# Patient Record
Sex: FEMALE | Race: BLACK OR AFRICAN AMERICAN | NOT HISPANIC OR LATINO | Employment: FULL TIME | URBAN - METROPOLITAN AREA
[De-identification: names, ages, dates, MRNs, and addresses within clinical notes are randomized per-mention and may not be internally consistent; named-entity substitution may affect disease eponyms.]

---

## 2023-02-03 VITALS
HEIGHT: 64 IN | BODY MASS INDEX: 41.32 KG/M2 | WEIGHT: 242 LBS | DIASTOLIC BLOOD PRESSURE: 66 MMHG | SYSTOLIC BLOOD PRESSURE: 124 MMHG

## 2023-02-03 PROBLEM — E78.5 HYPERLIPEMIA: Status: ACTIVE | Noted: 2023-02-03

## 2023-02-03 PROBLEM — K21.9 GERD (GASTROESOPHAGEAL REFLUX DISEASE): Status: ACTIVE | Noted: 2023-02-03

## 2023-02-03 PROBLEM — J45.40: Status: ACTIVE | Noted: 2023-02-03

## 2023-02-03 PROBLEM — G57.12 MERALGIA PARESTHETICA OF LEFT SIDE: Status: ACTIVE | Noted: 2023-02-03

## 2023-02-03 PROBLEM — I25.10 ARTERIOSCLEROSIS OF CORONARY ARTERY: Status: ACTIVE | Noted: 2023-02-03

## 2023-02-03 PROBLEM — R06.02 SOB (SHORTNESS OF BREATH) ON EXERTION: Status: ACTIVE | Noted: 2023-02-03

## 2023-02-03 PROBLEM — E66.9 OBESITY: Status: ACTIVE | Noted: 2023-02-03

## 2023-02-03 PROBLEM — G47.33 OSA (OBSTRUCTIVE SLEEP APNEA): Status: ACTIVE | Noted: 2023-02-03

## 2023-02-03 PROBLEM — G56.00 CARPAL TUNNEL SYNDROME: Status: ACTIVE | Noted: 2023-02-03

## 2023-02-03 PROBLEM — R00.2 PALPITATIONS: Status: ACTIVE | Noted: 2023-02-03

## 2023-02-03 PROBLEM — B97.7 HPV IN FEMALE: Status: ACTIVE | Noted: 2023-02-03

## 2023-02-03 PROBLEM — I10 BENIGN HYPERTENSION: Status: ACTIVE | Noted: 2023-02-03

## 2023-02-03 PROBLEM — E11.9 DIABETES MELLITUS (MULTI): Status: ACTIVE | Noted: 2023-02-03

## 2023-02-03 RX ORDER — GLUCOSAMINE/MSM/CHONDROIT SULF 500-166.6
1 TABLET ORAL DAILY
COMMUNITY
Start: 2021-07-19

## 2023-02-03 RX ORDER — ATORVASTATIN CALCIUM 80 MG/1
80 TABLET, FILM COATED ORAL NIGHTLY
COMMUNITY
Start: 2015-09-04 | End: 2023-03-15

## 2023-02-03 RX ORDER — METOPROLOL TARTRATE 50 MG/1
50 TABLET ORAL 2 TIMES DAILY
COMMUNITY
Start: 2015-09-04 | End: 2023-03-15

## 2023-02-03 RX ORDER — AMLODIPINE BESYLATE 5 MG/1
5 TABLET ORAL DAILY
COMMUNITY
Start: 2020-01-09 | End: 2023-03-15

## 2023-02-03 RX ORDER — EZETIMIBE 10 MG/1
10 TABLET ORAL DAILY
COMMUNITY
Start: 2018-02-02 | End: 2023-04-04 | Stop reason: SDUPTHER

## 2023-02-03 RX ORDER — LISINOPRIL 20 MG/1
20 TABLET ORAL DAILY
COMMUNITY
Start: 2022-03-25 | End: 2023-03-15

## 2023-02-03 RX ORDER — CHOLECALCIFEROL (VITAMIN D3) 50 MCG
2000 TABLET ORAL DAILY
COMMUNITY
Start: 2017-06-29

## 2023-02-03 RX ORDER — PANTOPRAZOLE SODIUM 40 MG/1
40 TABLET, DELAYED RELEASE ORAL DAILY
COMMUNITY
Start: 2022-06-04 | End: 2023-04-04 | Stop reason: SDUPTHER

## 2023-02-27 PROBLEM — M79.671 RIGHT FOOT PAIN: Status: ACTIVE | Noted: 2023-02-27

## 2023-02-27 PROBLEM — H43.399 FLOATERS: Status: ACTIVE | Noted: 2023-02-27

## 2023-02-27 PROBLEM — J98.4 RESTRICTIVE LUNG DISEASE: Status: ACTIVE | Noted: 2023-02-27
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
## 2023-02-27 PROBLEM — R06.83 SNORING: Status: ACTIVE | Noted: 2023-02-27

## 2023-02-27 PROBLEM — R42 DIZZINESS: Status: ACTIVE | Noted: 2023-02-27

## 2023-02-27 PROBLEM — J01.90 ACUTE SINUSITIS: Status: ACTIVE | Noted: 2023-02-27

## 2023-02-27 PROBLEM — M79.662 PAIN OF LEFT CALF: Status: ACTIVE | Noted: 2023-02-27

## 2023-02-27 PROBLEM — M79.89 ARM SWELLING: Status: ACTIVE | Noted: 2023-02-27

## 2023-02-27 PROBLEM — M79.603 ARM PAIN: Status: ACTIVE | Noted: 2023-02-27

## 2023-02-27 PROBLEM — M25.569 PAIN IN JOINT, LOWER LEG: Status: ACTIVE | Noted: 2023-02-27

## 2023-02-27 PROBLEM — M79.602 PAIN OF LEFT UPPER EXTREMITY: Status: ACTIVE | Noted: 2023-02-27

## 2023-02-27 PROBLEM — M53.3 TAIL BONE PAIN: Status: ACTIVE | Noted: 2023-02-27

## 2023-02-27 PROBLEM — M79.672 LEFT FOOT PAIN: Status: ACTIVE | Noted: 2023-02-27

## 2023-02-27 PROBLEM — R76.11 POSITIVE TB TEST: Status: ACTIVE | Noted: 2023-02-27

## 2023-02-27 PROBLEM — M79.676 PAIN OF GREAT TOE: Status: ACTIVE | Noted: 2023-02-27

## 2023-02-27 PROBLEM — M25.519 SHOULDER PAIN: Status: ACTIVE | Noted: 2023-02-27

## 2023-02-27 PROBLEM — J45.909 EXTRINSIC ASTHMA (HHS-HCC): Status: ACTIVE | Noted: 2023-02-27

## 2023-02-27 PROBLEM — R05.9 COUGH: Status: ACTIVE | Noted: 2023-02-27

## 2023-02-27 PROBLEM — R20.0 NUMBNESS: Status: ACTIVE | Noted: 2023-02-27

## 2023-02-27 PROBLEM — R20.0 LEFT ARM NUMBNESS: Status: ACTIVE | Noted: 2023-02-27

## 2023-02-27 PROBLEM — N62 LARGE BREASTS: Status: ACTIVE | Noted: 2023-02-27

## 2023-02-27 PROBLEM — M79.89 LEFT LEG SWELLING: Status: ACTIVE | Noted: 2023-02-27

## 2023-02-27 PROBLEM — E78.00 HIGH CHOLESTEROL: Status: ACTIVE | Noted: 2023-02-27

## 2023-02-27 PROBLEM — J20.9 ACUTE BRONCHITIS: Status: ACTIVE | Noted: 2023-02-27

## 2023-02-27 PROBLEM — M54.9 BACK PAIN: Status: ACTIVE | Noted: 2023-02-27

## 2023-02-27 PROBLEM — M79.601 PAIN OF RIGHT UPPER EXTREMITY: Status: ACTIVE | Noted: 2023-02-27

## 2023-02-27 PROBLEM — L60.0 INGROWING NAIL: Status: ACTIVE | Noted: 2023-02-27

## 2023-02-27 PROBLEM — Z20.822 EXPOSURE TO COVID-19 VIRUS: Status: ACTIVE | Noted: 2023-02-27

## 2023-02-27 PROBLEM — M79.642 PAIN OF LEFT HAND: Status: ACTIVE | Noted: 2023-02-27

## 2023-02-27 PROBLEM — R07.9 CHEST PAIN: Status: ACTIVE | Noted: 2023-02-27

## 2023-02-27 PROBLEM — R73.9 ELEVATED BLOOD SUGAR: Status: ACTIVE | Noted: 2023-02-27

## 2023-02-27 PROBLEM — G47.30 SLEEP APNEA: Status: ACTIVE | Noted: 2023-02-27

## 2023-02-27 PROBLEM — E66.01 MORBID OBESITY (MULTI): Status: ACTIVE | Noted: 2023-02-27

## 2023-02-27 PROBLEM — L84 CORN OF TOE: Status: ACTIVE | Noted: 2023-02-27

## 2023-02-27 PROBLEM — D22.9 NUMEROUS MOLES: Status: ACTIVE | Noted: 2023-02-27

## 2023-02-27 PROBLEM — J31.0 RHINITIS, CHRONIC: Status: ACTIVE | Noted: 2023-02-27

## 2023-03-15 DIAGNOSIS — E78.5 HYPERLIPIDEMIA, UNSPECIFIED: ICD-10-CM

## 2023-03-15 DIAGNOSIS — E11.9 TYPE 2 DIABETES MELLITUS WITHOUT COMPLICATIONS (MULTI): ICD-10-CM

## 2023-03-15 DIAGNOSIS — I10 ESSENTIAL (PRIMARY) HYPERTENSION: ICD-10-CM

## 2023-03-15 DIAGNOSIS — I25.10 ATHEROSCLEROTIC HEART DISEASE OF NATIVE CORONARY ARTERY WITHOUT ANGINA PECTORIS: ICD-10-CM

## 2023-03-15 RX ORDER — LISINOPRIL 20 MG/1
TABLET ORAL
Qty: 90 TABLET | Refills: 1 | Status: SHIPPED | OUTPATIENT
Start: 2023-03-15 | End: 2023-04-04 | Stop reason: SDUPTHER

## 2023-03-15 RX ORDER — METFORMIN HYDROCHLORIDE 1000 MG/1
TABLET ORAL
Qty: 180 TABLET | Refills: 1 | Status: SHIPPED | OUTPATIENT
Start: 2023-03-15 | End: 2023-04-04 | Stop reason: SDUPTHER

## 2023-03-15 RX ORDER — METOPROLOL TARTRATE 50 MG/1
TABLET ORAL
Qty: 180 TABLET | Refills: 1 | Status: SHIPPED | OUTPATIENT
Start: 2023-03-15 | End: 2023-04-04 | Stop reason: SDUPTHER

## 2023-03-15 RX ORDER — ATORVASTATIN CALCIUM 80 MG/1
TABLET, FILM COATED ORAL
Qty: 90 TABLET | Refills: 1 | Status: SHIPPED | OUTPATIENT
Start: 2023-03-15 | End: 2023-04-04 | Stop reason: SDUPTHER

## 2023-03-15 RX ORDER — AMLODIPINE BESYLATE 5 MG/1
TABLET ORAL
Qty: 90 TABLET | Refills: 1 | Status: SHIPPED | OUTPATIENT
Start: 2023-03-15 | End: 2023-04-04 | Stop reason: SDUPTHER

## 2023-04-03 LAB
ALANINE AMINOTRANSFERASE (SGPT) (U/L) IN SER/PLAS: 48 U/L (ref 7–45)
ALBUMIN (G/DL) IN SER/PLAS: 3.8 G/DL (ref 3.4–5)
ALKALINE PHOSPHATASE (U/L) IN SER/PLAS: 81 U/L (ref 33–136)
ANION GAP IN SER/PLAS: 10 MMOL/L (ref 10–20)
ASPARTATE AMINOTRANSFERASE (SGOT) (U/L) IN SER/PLAS: 25 U/L (ref 9–39)
BILIRUBIN TOTAL (MG/DL) IN SER/PLAS: 0.6 MG/DL (ref 0–1.2)
CALCIUM (MG/DL) IN SER/PLAS: 9.8 MG/DL (ref 8.6–10.6)
CARBON DIOXIDE, TOTAL (MMOL/L) IN SER/PLAS: 30 MMOL/L (ref 21–32)
CHLORIDE (MMOL/L) IN SER/PLAS: 105 MMOL/L (ref 98–107)
CHOLESTEROL (MG/DL) IN SER/PLAS: 134 MG/DL (ref 0–199)
CHOLESTEROL IN HDL (MG/DL) IN SER/PLAS: 45 MG/DL
CHOLESTEROL/HDL RATIO: 3
CREATININE (MG/DL) IN SER/PLAS: 0.89 MG/DL (ref 0.5–1.05)
ERYTHROCYTE DISTRIBUTION WIDTH (RATIO) BY AUTOMATED COUNT: 15 % (ref 11.5–14.5)
ERYTHROCYTE MEAN CORPUSCULAR HEMOGLOBIN CONCENTRATION (G/DL) BY AUTOMATED: 33.2 G/DL (ref 32–36)
ERYTHROCYTE MEAN CORPUSCULAR VOLUME (FL) BY AUTOMATED COUNT: 80 FL (ref 80–100)
ERYTHROCYTES (10*6/UL) IN BLOOD BY AUTOMATED COUNT: 5.06 X10E12/L (ref 4–5.2)
ESTIMATED AVERAGE GLUCOSE FOR HBA1C: 131 MG/DL
GFR FEMALE: 72 ML/MIN/1.73M2
GLUCOSE (MG/DL) IN SER/PLAS: 90 MG/DL (ref 74–99)
HEMATOCRIT (%) IN BLOOD BY AUTOMATED COUNT: 40.7 % (ref 36–46)
HEMOGLOBIN (G/DL) IN BLOOD: 13.5 G/DL (ref 12–16)
HEMOGLOBIN A1C/HEMOGLOBIN TOTAL IN BLOOD: 6.2 %
LDL: 73 MG/DL (ref 0–99)
LEUKOCYTES (10*3/UL) IN BLOOD BY AUTOMATED COUNT: 7.7 X10E9/L (ref 4.4–11.3)
NRBC (PER 100 WBCS) BY AUTOMATED COUNT: 0 /100 WBC (ref 0–0)
PLATELETS (10*3/UL) IN BLOOD AUTOMATED COUNT: 199 X10E9/L (ref 150–450)
POTASSIUM (MMOL/L) IN SER/PLAS: 4.3 MMOL/L (ref 3.5–5.3)
PROTEIN TOTAL: 6.2 G/DL (ref 6.4–8.2)
SODIUM (MMOL/L) IN SER/PLAS: 141 MMOL/L (ref 136–145)
TRIGLYCERIDE (MG/DL) IN SER/PLAS: 79 MG/DL (ref 0–149)
UREA NITROGEN (MG/DL) IN SER/PLAS: 15 MG/DL (ref 6–23)
VLDL: 16 MG/DL (ref 0–40)

## 2023-04-04 ENCOUNTER — OFFICE VISIT (OUTPATIENT)
Dept: PRIMARY CARE | Facility: CLINIC | Age: 68
End: 2023-04-04
Payer: COMMERCIAL

## 2023-04-04 VITALS
WEIGHT: 240 LBS | BODY MASS INDEX: 40.97 KG/M2 | DIASTOLIC BLOOD PRESSURE: 74 MMHG | HEIGHT: 64 IN | SYSTOLIC BLOOD PRESSURE: 100 MMHG

## 2023-04-04 DIAGNOSIS — E11.9 TYPE 2 DIABETES MELLITUS WITHOUT COMPLICATIONS (MULTI): ICD-10-CM

## 2023-04-04 DIAGNOSIS — R74.8 ELEVATED LIVER ENZYMES: ICD-10-CM

## 2023-04-04 DIAGNOSIS — E78.5 HYPERLIPIDEMIA, UNSPECIFIED: ICD-10-CM

## 2023-04-04 DIAGNOSIS — I10 ESSENTIAL (PRIMARY) HYPERTENSION: ICD-10-CM

## 2023-04-04 DIAGNOSIS — G47.33 OSA (OBSTRUCTIVE SLEEP APNEA): Primary | ICD-10-CM

## 2023-04-04 DIAGNOSIS — J98.4 RESTRICTIVE LUNG DISEASE: ICD-10-CM

## 2023-04-04 DIAGNOSIS — I25.10 ATHEROSCLEROTIC HEART DISEASE OF NATIVE CORONARY ARTERY WITHOUT ANGINA PECTORIS: ICD-10-CM

## 2023-04-04 DIAGNOSIS — K21.9 GASTROESOPHAGEAL REFLUX DISEASE WITHOUT ESOPHAGITIS: ICD-10-CM

## 2023-04-04 DIAGNOSIS — I25.10 ARTERIOSCLEROSIS OF CORONARY ARTERY: ICD-10-CM

## 2023-04-04 PROCEDURE — 3078F DIAST BP <80 MM HG: CPT | Performed by: INTERNAL MEDICINE

## 2023-04-04 PROCEDURE — 1159F MED LIST DOCD IN RCRD: CPT | Performed by: INTERNAL MEDICINE

## 2023-04-04 PROCEDURE — 3074F SYST BP LT 130 MM HG: CPT | Performed by: INTERNAL MEDICINE

## 2023-04-04 PROCEDURE — 3044F HG A1C LEVEL LT 7.0%: CPT | Performed by: INTERNAL MEDICINE

## 2023-04-04 PROCEDURE — 4010F ACE/ARB THERAPY RXD/TAKEN: CPT | Performed by: INTERNAL MEDICINE

## 2023-04-04 PROCEDURE — 99214 OFFICE O/P EST MOD 30 MIN: CPT | Performed by: INTERNAL MEDICINE

## 2023-04-04 RX ORDER — LISINOPRIL 20 MG/1
20 TABLET ORAL DAILY
Qty: 90 TABLET | Refills: 0 | Status: SHIPPED | OUTPATIENT
Start: 2023-04-04 | End: 2023-06-13

## 2023-04-04 RX ORDER — AMLODIPINE BESYLATE 5 MG/1
5 TABLET ORAL DAILY
Qty: 90 TABLET | Refills: 0 | Status: SHIPPED | OUTPATIENT
Start: 2023-04-04 | End: 2023-06-13

## 2023-04-04 RX ORDER — EZETIMIBE 10 MG/1
10 TABLET ORAL DAILY
Qty: 90 TABLET | Refills: 0 | Status: SHIPPED | OUTPATIENT
Start: 2023-04-04 | End: 2023-06-13

## 2023-04-04 RX ORDER — ATORVASTATIN CALCIUM 80 MG/1
80 TABLET, FILM COATED ORAL NIGHTLY
Qty: 90 TABLET | Refills: 0 | Status: SHIPPED | OUTPATIENT
Start: 2023-04-04 | End: 2023-06-13

## 2023-04-04 RX ORDER — PANTOPRAZOLE SODIUM 40 MG/1
40 TABLET, DELAYED RELEASE ORAL DAILY
Qty: 90 TABLET | Refills: 0 | Status: SHIPPED | OUTPATIENT
Start: 2023-04-04 | End: 2023-06-27 | Stop reason: SDUPTHER

## 2023-04-04 RX ORDER — METFORMIN HYDROCHLORIDE 1000 MG/1
1000 TABLET ORAL 2 TIMES DAILY
Qty: 180 TABLET | Refills: 0 | Status: SHIPPED | OUTPATIENT
Start: 2023-04-04 | End: 2023-06-13

## 2023-04-04 RX ORDER — METOPROLOL TARTRATE 50 MG/1
50 TABLET ORAL 2 TIMES DAILY
Qty: 180 TABLET | Refills: 0 | Status: SHIPPED | OUTPATIENT
Start: 2023-04-04 | End: 2023-06-13

## 2023-04-04 NOTE — PROGRESS NOTES
"Subjective   Patient ID: Stefanie Garnett is a 67 y.o. female who presents for Follow-up.    HPI   Patient here for follow-up  Did blood work  Got new CPAP machine  She is using her CPAP machine regularly  Follow-up on hypertension high cholesterol  Having a lot of acid  Not able to lose weight.  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  Patient has not lost weight  She has not changed her eating habits yet      Patient is here for follow-up on obstructive sleep apnea  She is using the CPAP machine it is helping her  Follow-up on hypertension high cholesterol coronary artery disease   needs medication refill  Not watching diet gained weight    Needs flu shot  Overdue for GYN exam  She is seriously considering gastric bypass surgery because she is not able to lose weight she wants insurance papers filled for her hospital stay for MI    She was not using her CPAP machine  Came to the hospital with chest pain and non-ST elevation MI had repeat cardiac catheter done did not show any significant blockage  Patient was treated with antibiotic and steroid and feeling better  Her blood pressure was high and Norvasc was started She has degenerative changes in the lower back so she cannot do heavy lifting   Review of Systems    Objective   /74   Ht 1.626 m (5' 4\")   Wt 109 kg (240 lb)   BMI 41.20 kg/m²     Physical Exam  Vitals reviewed.   Constitutional:       Appearance: Normal appearance. "   HENT:      Head: Normocephalic and atraumatic.      Right Ear: Tympanic membrane, ear canal and external ear normal.      Left Ear: Tympanic membrane, ear canal and external ear normal.      Nose: Nose normal.      Mouth/Throat:      Pharynx: Oropharynx is clear.   Eyes:      Extraocular Movements: Extraocular movements intact.      Conjunctiva/sclera: Conjunctivae normal.      Pupils: Pupils are equal, round, and reactive to light.   Cardiovascular:      Rate and Rhythm: Normal rate and regular rhythm.      Pulses: Normal pulses.      Heart sounds: Normal heart sounds.   Pulmonary:      Effort: Pulmonary effort is normal.      Breath sounds: Normal breath sounds.   Abdominal:      General: Abdomen is flat. Bowel sounds are normal.      Palpations: Abdomen is soft.   Musculoskeletal:      Cervical back: Normal range of motion and neck supple.   Skin:     General: Skin is warm and dry.   Neurological:      General: No focal deficit present.      Mental Status: She is alert and oriented to person, place, and time.   Psychiatric:         Mood and Affect: Mood normal.       Assessment/Plan   Problem List Items Addressed This Visit          Nervous    ANGELICA (obstructive sleep apnea) - Primary       Respiratory    Restrictive lung disease       Circulatory    Arteriosclerosis of coronary artery    Relevant Medications    amLODIPine (Norvasc) 5 mg tablet    ezetimibe (Zetia) 10 mg tablet    metoprolol tartrate (Lopressor) 50 mg tablet       Digestive    GERD (gastroesophageal reflux disease)    Relevant Medications    pantoprazole (ProtoNix) 40 mg EC tablet     Other Visit Diagnoses       Essential (primary) hypertension        Relevant Medications    amLODIPine (Norvasc) 5 mg tablet    lisinopril 20 mg tablet    metoprolol tartrate (Lopressor) 50 mg tablet    Other Relevant Orders    CBC    Comprehensive Metabolic Panel    Hemoglobin A1C    Lipid Panel    Hyperlipidemia, unspecified        Relevant Medications     atorvastatin (Lipitor) 80 mg tablet    Other Relevant Orders    CBC    Comprehensive Metabolic Panel    Hemoglobin A1C    Lipid Panel    Atherosclerotic heart disease of native coronary artery without angina pectoris        Relevant Medications    amLODIPine (Norvasc) 5 mg tablet    lisinopril 20 mg tablet    metoprolol tartrate (Lopressor) 50 mg tablet    Type 2 diabetes mellitus without complications (CMS/[facility])        Relevant Medications    metFORMIN (Glucophage) 1,000 mg tablet    Other Relevant Orders    CBC    Comprehensive Metabolic Panel    Hemoglobin A1C    Lipid Panel               past recap  1. CAD. Had NSTEMI August 2015. Catheter showed no definite high-grade culprit lesion that would necessitate intervention. There was some moderate disease in the distal aspect of the first marginal branch of the LCx, but otherwise the patient had very minimal CAD. Echocardiogram done June 2017 showed an EF of 60-65%. Patient had repeat exercise nuclear stress test June 2017 which was negative for ischemia. Patient had again acute MI cardiac catheter did not show any blockage. MI was attributed to coronary spasm    2. Hypertension. Adequately controlled     3. Hyperlipidemia. . Continue atorvastatin 80 mg daily as well as Zetia 10 mg daily.    4. Obesity. Weight loss was encouraged. Refer to Dr. Skip Suarez for considering gastric bypass    5. Sleep apnea. Using CPAP    6. Carotid ultrasound. No hemodynamically significant stenosis when done June 2017.    7. Diabetes  A1c 6.4  Doing better  Continue Metformin    #8 acute sinusitis  Treat with Z-Tien    Flu shot given  Mammogram ordered  Meds refilled for 6 months    4/423  Patient is looking stable  Blood pressure stable  Blood work reviewed  Cholesterol under control  Blood pressure stable  A1c 6.2  Liver enzymes slightly elevated  Possibility of fatty liver  Discussed complications  Using CPAP  Follow-up blood work in 3 months  Medications refilled

## 2023-05-04 ENCOUNTER — APPOINTMENT (OUTPATIENT)
Dept: PRIMARY CARE | Facility: CLINIC | Age: 68
End: 2023-05-04
Payer: COMMERCIAL

## 2023-06-11 DIAGNOSIS — I10 ESSENTIAL (PRIMARY) HYPERTENSION: ICD-10-CM

## 2023-06-11 DIAGNOSIS — E11.9 TYPE 2 DIABETES MELLITUS WITHOUT COMPLICATIONS (MULTI): ICD-10-CM

## 2023-06-11 DIAGNOSIS — E78.5 HYPERLIPIDEMIA, UNSPECIFIED: ICD-10-CM

## 2023-06-11 DIAGNOSIS — I25.10 ARTERIOSCLEROSIS OF CORONARY ARTERY: ICD-10-CM

## 2023-06-11 DIAGNOSIS — I25.10 ATHEROSCLEROTIC HEART DISEASE OF NATIVE CORONARY ARTERY WITHOUT ANGINA PECTORIS: ICD-10-CM

## 2023-06-13 RX ORDER — EZETIMIBE 10 MG/1
10 TABLET ORAL DAILY
Qty: 90 TABLET | Refills: 0 | Status: SHIPPED | OUTPATIENT
Start: 2023-06-13 | End: 2023-06-27 | Stop reason: SDUPTHER

## 2023-06-13 RX ORDER — ATORVASTATIN CALCIUM 80 MG/1
80 TABLET, FILM COATED ORAL NIGHTLY
Qty: 90 TABLET | Refills: 0 | Status: SHIPPED | OUTPATIENT
Start: 2023-06-13 | End: 2023-06-27 | Stop reason: SDUPTHER

## 2023-06-13 RX ORDER — AMLODIPINE BESYLATE 5 MG/1
5 TABLET ORAL DAILY
Qty: 90 TABLET | Refills: 0 | Status: SHIPPED | OUTPATIENT
Start: 2023-06-13 | End: 2023-06-27 | Stop reason: SDUPTHER

## 2023-06-13 RX ORDER — METFORMIN HYDROCHLORIDE 1000 MG/1
1000 TABLET ORAL 2 TIMES DAILY
Qty: 180 TABLET | Refills: 0 | Status: SHIPPED | OUTPATIENT
Start: 2023-06-13 | End: 2023-06-27 | Stop reason: SDUPTHER

## 2023-06-13 RX ORDER — METOPROLOL TARTRATE 50 MG/1
50 TABLET ORAL 2 TIMES DAILY
Qty: 180 TABLET | Refills: 0 | Status: SHIPPED | OUTPATIENT
Start: 2023-06-13 | End: 2023-06-27 | Stop reason: SDUPTHER

## 2023-06-13 RX ORDER — LISINOPRIL 20 MG/1
20 TABLET ORAL DAILY
Qty: 90 TABLET | Refills: 0 | Status: SHIPPED | OUTPATIENT
Start: 2023-06-13 | End: 2023-06-27 | Stop reason: SDUPTHER

## 2023-06-22 ENCOUNTER — LAB (OUTPATIENT)
Dept: LAB | Facility: LAB | Age: 68
End: 2023-06-22
Payer: COMMERCIAL

## 2023-06-22 DIAGNOSIS — I10 ESSENTIAL (PRIMARY) HYPERTENSION: ICD-10-CM

## 2023-06-22 DIAGNOSIS — E11.9 TYPE 2 DIABETES MELLITUS WITHOUT COMPLICATIONS (MULTI): ICD-10-CM

## 2023-06-22 DIAGNOSIS — E78.5 HYPERLIPIDEMIA, UNSPECIFIED: ICD-10-CM

## 2023-06-22 LAB
ALANINE AMINOTRANSFERASE (SGPT) (U/L) IN SER/PLAS: 49 U/L (ref 7–45)
ALBUMIN (G/DL) IN SER/PLAS: 4 G/DL (ref 3.4–5)
ALKALINE PHOSPHATASE (U/L) IN SER/PLAS: 92 U/L (ref 33–136)
ANION GAP IN SER/PLAS: 11 MMOL/L (ref 10–20)
ASPARTATE AMINOTRANSFERASE (SGOT) (U/L) IN SER/PLAS: 24 U/L (ref 9–39)
BILIRUBIN TOTAL (MG/DL) IN SER/PLAS: 0.7 MG/DL (ref 0–1.2)
CALCIUM (MG/DL) IN SER/PLAS: 9.8 MG/DL (ref 8.6–10.6)
CARBON DIOXIDE, TOTAL (MMOL/L) IN SER/PLAS: 29 MMOL/L (ref 21–32)
CHLORIDE (MMOL/L) IN SER/PLAS: 107 MMOL/L (ref 98–107)
CHOLESTEROL (MG/DL) IN SER/PLAS: 161 MG/DL (ref 0–199)
CHOLESTEROL IN HDL (MG/DL) IN SER/PLAS: 48.4 MG/DL
CHOLESTEROL/HDL RATIO: 3.3
CREATININE (MG/DL) IN SER/PLAS: 0.82 MG/DL (ref 0.5–1.05)
ERYTHROCYTE DISTRIBUTION WIDTH (RATIO) BY AUTOMATED COUNT: 15.9 % (ref 11.5–14.5)
ERYTHROCYTE MEAN CORPUSCULAR HEMOGLOBIN CONCENTRATION (G/DL) BY AUTOMATED: 33.9 G/DL (ref 32–36)
ERYTHROCYTE MEAN CORPUSCULAR VOLUME (FL) BY AUTOMATED COUNT: 78 FL (ref 80–100)
ERYTHROCYTES (10*6/UL) IN BLOOD BY AUTOMATED COUNT: 5.34 X10E12/L (ref 4–5.2)
ESTIMATED AVERAGE GLUCOSE FOR HBA1C: 134 MG/DL
GFR FEMALE: 79 ML/MIN/1.73M2
GLUCOSE (MG/DL) IN SER/PLAS: 83 MG/DL (ref 74–99)
HEMATOCRIT (%) IN BLOOD BY AUTOMATED COUNT: 41.9 % (ref 36–46)
HEMOGLOBIN (G/DL) IN BLOOD: 14.2 G/DL (ref 12–16)
HEMOGLOBIN A1C/HEMOGLOBIN TOTAL IN BLOOD: 6.3 %
LDL: 95 MG/DL (ref 0–99)
LEUKOCYTES (10*3/UL) IN BLOOD BY AUTOMATED COUNT: 7.3 X10E9/L (ref 4.4–11.3)
NRBC (PER 100 WBCS) BY AUTOMATED COUNT: 0 /100 WBC (ref 0–0)
PLATELETS (10*3/UL) IN BLOOD AUTOMATED COUNT: 181 X10E9/L (ref 150–450)
POTASSIUM (MMOL/L) IN SER/PLAS: 4.4 MMOL/L (ref 3.5–5.3)
PROTEIN TOTAL: 6.3 G/DL (ref 6.4–8.2)
SODIUM (MMOL/L) IN SER/PLAS: 143 MMOL/L (ref 136–145)
TRIGLYCERIDE (MG/DL) IN SER/PLAS: 86 MG/DL (ref 0–149)
UREA NITROGEN (MG/DL) IN SER/PLAS: 11 MG/DL (ref 6–23)
VLDL: 17 MG/DL (ref 0–40)

## 2023-06-22 PROCEDURE — 80061 LIPID PANEL: CPT

## 2023-06-22 PROCEDURE — 80053 COMPREHEN METABOLIC PANEL: CPT

## 2023-06-22 PROCEDURE — 83036 HEMOGLOBIN GLYCOSYLATED A1C: CPT

## 2023-06-22 PROCEDURE — 36415 COLL VENOUS BLD VENIPUNCTURE: CPT

## 2023-06-22 PROCEDURE — 85027 COMPLETE CBC AUTOMATED: CPT

## 2023-06-27 ENCOUNTER — OFFICE VISIT (OUTPATIENT)
Dept: PRIMARY CARE | Facility: CLINIC | Age: 68
End: 2023-06-27
Payer: COMMERCIAL

## 2023-06-27 VITALS
BODY MASS INDEX: 41.15 KG/M2 | HEIGHT: 64 IN | SYSTOLIC BLOOD PRESSURE: 124 MMHG | WEIGHT: 241 LBS | DIASTOLIC BLOOD PRESSURE: 70 MMHG

## 2023-06-27 DIAGNOSIS — I10 ESSENTIAL (PRIMARY) HYPERTENSION: ICD-10-CM

## 2023-06-27 DIAGNOSIS — E66.01 MORBID OBESITY (MULTI): ICD-10-CM

## 2023-06-27 DIAGNOSIS — I25.10 ATHEROSCLEROTIC HEART DISEASE OF NATIVE CORONARY ARTERY WITHOUT ANGINA PECTORIS: ICD-10-CM

## 2023-06-27 DIAGNOSIS — E11.9 TYPE 2 DIABETES MELLITUS WITHOUT COMPLICATIONS (MULTI): ICD-10-CM

## 2023-06-27 DIAGNOSIS — E78.5 HYPERLIPIDEMIA, UNSPECIFIED: ICD-10-CM

## 2023-06-27 DIAGNOSIS — E11.9 TYPE 2 DIABETES MELLITUS WITHOUT COMPLICATION, WITHOUT LONG-TERM CURRENT USE OF INSULIN (MULTI): Primary | ICD-10-CM

## 2023-06-27 DIAGNOSIS — K21.9 GASTROESOPHAGEAL REFLUX DISEASE WITHOUT ESOPHAGITIS: ICD-10-CM

## 2023-06-27 DIAGNOSIS — I25.10 ARTERIOSCLEROSIS OF CORONARY ARTERY: ICD-10-CM

## 2023-06-27 DIAGNOSIS — R74.8 ELEVATED LIVER ENZYMES: ICD-10-CM

## 2023-06-27 PROBLEM — D64.89 OTHER SPECIFIED ANEMIAS: Status: ACTIVE | Noted: 2023-06-27

## 2023-06-27 PROBLEM — E73.9 INTESTINAL DISACCHARIDASE DEFICIENCIES AND DISACCHARIDE MALABSORPTION: Status: ACTIVE | Noted: 2023-06-27

## 2023-06-27 PROCEDURE — 3074F SYST BP LT 130 MM HG: CPT | Performed by: INTERNAL MEDICINE

## 2023-06-27 PROCEDURE — 1159F MED LIST DOCD IN RCRD: CPT | Performed by: INTERNAL MEDICINE

## 2023-06-27 PROCEDURE — 4010F ACE/ARB THERAPY RXD/TAKEN: CPT | Performed by: INTERNAL MEDICINE

## 2023-06-27 PROCEDURE — 3078F DIAST BP <80 MM HG: CPT | Performed by: INTERNAL MEDICINE

## 2023-06-27 PROCEDURE — 3044F HG A1C LEVEL LT 7.0%: CPT | Performed by: INTERNAL MEDICINE

## 2023-06-27 PROCEDURE — 99213 OFFICE O/P EST LOW 20 MIN: CPT | Performed by: INTERNAL MEDICINE

## 2023-06-27 PROCEDURE — 1036F TOBACCO NON-USER: CPT | Performed by: INTERNAL MEDICINE

## 2023-06-27 RX ORDER — METOPROLOL TARTRATE 50 MG/1
50 TABLET ORAL 2 TIMES DAILY
Qty: 180 TABLET | Refills: 0 | Status: SHIPPED | OUTPATIENT
Start: 2023-06-27 | End: 2024-01-18

## 2023-06-27 RX ORDER — METFORMIN HYDROCHLORIDE 1000 MG/1
1000 TABLET ORAL 2 TIMES DAILY
Qty: 180 TABLET | Refills: 0 | Status: SHIPPED | OUTPATIENT
Start: 2023-06-27 | End: 2024-01-18

## 2023-06-27 RX ORDER — FLUTICASONE PROPIONATE 50 MCG
2 SPRAY, SUSPENSION (ML) NASAL DAILY
COMMUNITY
Start: 2023-02-22 | End: 2024-01-02 | Stop reason: ALTCHOICE

## 2023-06-27 RX ORDER — PANTOPRAZOLE SODIUM 40 MG/1
40 TABLET, DELAYED RELEASE ORAL DAILY
Qty: 90 TABLET | Refills: 0 | Status: SHIPPED | OUTPATIENT
Start: 2023-06-27 | End: 2023-07-25

## 2023-06-27 RX ORDER — AMLODIPINE BESYLATE 5 MG/1
5 TABLET ORAL DAILY
Qty: 90 TABLET | Refills: 0 | Status: SHIPPED | OUTPATIENT
Start: 2023-06-27 | End: 2024-01-18

## 2023-06-27 RX ORDER — LISINOPRIL 20 MG/1
20 TABLET ORAL DAILY
Qty: 90 TABLET | Refills: 0 | Status: SHIPPED | OUTPATIENT
Start: 2023-06-27 | End: 2024-02-22 | Stop reason: WASHOUT

## 2023-06-27 RX ORDER — EZETIMIBE 10 MG/1
10 TABLET ORAL DAILY
Qty: 90 TABLET | Refills: 0 | Status: SHIPPED | OUTPATIENT
Start: 2023-06-27 | End: 2024-01-18

## 2023-06-27 RX ORDER — ATORVASTATIN CALCIUM 80 MG/1
80 TABLET, FILM COATED ORAL NIGHTLY
Qty: 90 TABLET | Refills: 0 | Status: SHIPPED | OUTPATIENT
Start: 2023-06-27 | End: 2024-01-18

## 2023-06-27 NOTE — PROGRESS NOTES
"Subjective   Patient ID: Stefanie Garnett is a 68 y.o. female who presents for Follow-up and Med Refill.    Med Refill   Patient here for follow-up  Did blood work  Got new CPAP machine  She is using her CPAP machine regularly  Follow-up on hypertension high cholesterol  Having a lot of acid  Not able to lose weight.  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  Patient has not lost weight  She has not changed her eating habits yet        Patient is here for follow-up on obstructive sleep apnea  She is using the CPAP machine it is helping her  Follow-up on hypertension high cholesterol coronary artery disease   needs medication refill  Not watching diet gained weight     Needs flu shot  Overdue for GYN exam  She is seriously considering gastric bypass surgery because she is not able to lose weight she wants insurance papers filled for her hospital stay for MI     She was not using her CPAP machine  Came to the hospital with chest pain and non-ST elevation MI had repeat cardiac catheter done did not show any significant blockage  Patient was treated with antibiotic and steroid and feeling better  Her blood pressure was high and Norvasc was started She has degenerative changes in the lower back so she cannot do heavy lifting     Review of Systems    Objective   /70   Ht 1.626 m (5' 4\")   Wt 109 kg (241 lb)   BMI 41.37 kg/m²     Physical Exam  Vitals reviewed.   Constitutional:       " Appearance: Normal appearance.   HENT:      Head: Normocephalic and atraumatic.      Right Ear: Tympanic membrane, ear canal and external ear normal.      Left Ear: Tympanic membrane, ear canal and external ear normal.      Nose: Nose normal.      Mouth/Throat:      Pharynx: Oropharynx is clear.   Eyes:      Extraocular Movements: Extraocular movements intact.      Conjunctiva/sclera: Conjunctivae normal.      Pupils: Pupils are equal, round, and reactive to light.   Cardiovascular:      Rate and Rhythm: Normal rate and regular rhythm.      Pulses: Normal pulses.      Heart sounds: Normal heart sounds.   Pulmonary:      Effort: Pulmonary effort is normal.      Breath sounds: Normal breath sounds.   Abdominal:      General: Abdomen is flat. Bowel sounds are normal.      Palpations: Abdomen is soft.   Musculoskeletal:      Cervical back: Normal range of motion and neck supple.   Skin:     General: Skin is warm and dry.   Neurological:      General: No focal deficit present.      Mental Status: She is alert and oriented to person, place, and time.   Psychiatric:         Mood and Affect: Mood normal.       Assessment/Plan   Problem List Items Addressed This Visit          Cardiac and Vasculature    Arteriosclerosis of coronary artery    Relevant Medications    amLODIPine (Norvasc) 5 mg tablet    ezetimibe (Zetia) 10 mg tablet    metoprolol tartrate (Lopressor) 50 mg tablet       Endocrine/Metabolic    Diabetes mellitus (CMS/HCC) - Primary    Relevant Orders    CBC    Comprehensive Metabolic Panel    Hemoglobin A1C    Lipid Panel    TSH with reflex to Free T4 if abnormal    Morbid obesity (CMS/HCC)       Gastrointestinal and Abdominal    GERD (gastroesophageal reflux disease)    Relevant Medications    pantoprazole (ProtoNix) 40 mg EC tablet     Other Visit Diagnoses       Elevated liver enzymes        Relevant Orders    Comprehensive Metabolic Panel    Essential (primary) hypertension        Relevant Medications     amLODIPine (Norvasc) 5 mg tablet    lisinopril 20 mg tablet    metoprolol tartrate (Lopressor) 50 mg tablet    Hyperlipidemia, unspecified        Relevant Medications    atorvastatin (Lipitor) 80 mg tablet    Atherosclerotic heart disease of native coronary artery without angina pectoris        Relevant Medications    amLODIPine (Norvasc) 5 mg tablet    lisinopril 20 mg tablet    metoprolol tartrate (Lopressor) 50 mg tablet    Type 2 diabetes mellitus without complications (CMS/HCC)        Relevant Medications    metFORMIN (Glucophage) 1,000 mg tablet    Other Relevant Orders    CBC    Comprehensive Metabolic Panel    Hemoglobin A1C    Lipid Panel    TSH with reflex to Free T4 if abnormal        past recap  1. CAD. Had NSTEMI August 2015. Catheter showed no definite high-grade culprit lesion that would necessitate intervention. There was some moderate disease in the distal aspect of the first marginal branch of the LCx, but otherwise the patient had very minimal CAD. Echocardiogram done June 2017 showed an EF of 60-65%. Patient had repeat exercise nuclear stress test June 2017 which was negative for ischemia. Patient had again acute MI cardiac catheter did not show any blockage. MI was attributed to coronary spasm     2. Hypertension. Adequately controlled      3. Hyperlipidemia. . Continue atorvastatin 80 mg daily as well as Zetia 10 mg daily.     4. Obesity. Weight loss was encouraged. Refer to Dr. Skip Suarez for considering gastric bypass     5. Sleep apnea. Using CPAP     6. Carotid ultrasound. No hemodynamically significant stenosis when done June 2017.     7. Diabetes  A1c 6.4  Doing better  Continue Metformin     #8 acute sinusitis  Treat with Z-Tien     Flu shot given  Mammogram ordered  Meds refilled for 6 months     4/423  Patient is looking stable  Blood pressure stable  Blood work reviewed  Cholesterol under control  Blood pressure stable  A1c 6.2  Liver enzymes slightly elevated  Possibility of fatty  liver  Discussed complications  Using CPAP  Follow-up blood work in 3 months  Medications refilled    6/27/2023  Blood work reviewed  A1c 6.3 slightly up  Cholesterol under control  Patient is using CPAP with improvement in symptoms uses every night 4 to 6 hours  Patient is not able to lose weight again discussed various options  Encourage diet and exercise

## 2023-09-19 ENCOUNTER — APPOINTMENT (OUTPATIENT)
Dept: PRIMARY CARE | Facility: CLINIC | Age: 68
End: 2023-09-19
Payer: COMMERCIAL

## 2023-09-23 ENCOUNTER — LAB (OUTPATIENT)
Dept: LAB | Facility: LAB | Age: 68
End: 2023-09-23
Payer: COMMERCIAL

## 2023-09-23 DIAGNOSIS — R74.8 ELEVATED LIVER ENZYMES: ICD-10-CM

## 2023-09-23 DIAGNOSIS — E11.9 TYPE 2 DIABETES MELLITUS WITHOUT COMPLICATION, WITHOUT LONG-TERM CURRENT USE OF INSULIN (MULTI): ICD-10-CM

## 2023-09-23 LAB
ERYTHROCYTE DISTRIBUTION WIDTH (RATIO) BY AUTOMATED COUNT: 15.2 % (ref 11.5–14.5)
ERYTHROCYTE MEAN CORPUSCULAR HEMOGLOBIN CONCENTRATION (G/DL) BY AUTOMATED: 34.7 G/DL (ref 32–36)
ERYTHROCYTE MEAN CORPUSCULAR VOLUME (FL) BY AUTOMATED COUNT: 81 FL (ref 80–100)
ERYTHROCYTES (10*6/UL) IN BLOOD BY AUTOMATED COUNT: 4.94 X10E12/L (ref 4–5.2)
ESTIMATED AVERAGE GLUCOSE FOR HBA1C: 137 MG/DL
HEMATOCRIT (%) IN BLOOD BY AUTOMATED COUNT: 39.8 % (ref 36–46)
HEMOGLOBIN (G/DL) IN BLOOD: 13.8 G/DL (ref 12–16)
HEMOGLOBIN A1C/HEMOGLOBIN TOTAL IN BLOOD: 6.4 %
LEUKOCYTES (10*3/UL) IN BLOOD BY AUTOMATED COUNT: 8.4 X10E9/L (ref 4.4–11.3)
NRBC (PER 100 WBCS) BY AUTOMATED COUNT: 0 /100 WBC (ref 0–0)
PLATELETS (10*3/UL) IN BLOOD AUTOMATED COUNT: 182 X10E9/L (ref 150–450)

## 2023-09-23 PROCEDURE — 80061 LIPID PANEL: CPT

## 2023-09-23 PROCEDURE — 84443 ASSAY THYROID STIM HORMONE: CPT

## 2023-09-23 PROCEDURE — 80053 COMPREHEN METABOLIC PANEL: CPT

## 2023-09-23 PROCEDURE — 85027 COMPLETE CBC AUTOMATED: CPT

## 2023-09-23 PROCEDURE — 84439 ASSAY OF FREE THYROXINE: CPT

## 2023-09-23 PROCEDURE — 83036 HEMOGLOBIN GLYCOSYLATED A1C: CPT

## 2023-09-23 PROCEDURE — 36415 COLL VENOUS BLD VENIPUNCTURE: CPT

## 2023-09-24 LAB
ALANINE AMINOTRANSFERASE (SGPT) (U/L) IN SER/PLAS: 42 U/L (ref 7–45)
ALBUMIN (G/DL) IN SER/PLAS: 4.1 G/DL (ref 3.4–5)
ALKALINE PHOSPHATASE (U/L) IN SER/PLAS: 84 U/L (ref 33–136)
ANION GAP IN SER/PLAS: 12 MMOL/L (ref 10–20)
ASPARTATE AMINOTRANSFERASE (SGOT) (U/L) IN SER/PLAS: 21 U/L (ref 9–39)
BILIRUBIN TOTAL (MG/DL) IN SER/PLAS: 0.5 MG/DL (ref 0–1.2)
CALCIUM (MG/DL) IN SER/PLAS: 10.1 MG/DL (ref 8.6–10.6)
CARBON DIOXIDE, TOTAL (MMOL/L) IN SER/PLAS: 27 MMOL/L (ref 21–32)
CHLORIDE (MMOL/L) IN SER/PLAS: 108 MMOL/L (ref 98–107)
CHOLESTEROL (MG/DL) IN SER/PLAS: 145 MG/DL (ref 0–199)
CHOLESTEROL IN HDL (MG/DL) IN SER/PLAS: 47 MG/DL
CHOLESTEROL/HDL RATIO: 3.1
CREATININE (MG/DL) IN SER/PLAS: 0.79 MG/DL (ref 0.5–1.05)
GFR FEMALE: 82 ML/MIN/1.73M2
GLUCOSE (MG/DL) IN SER/PLAS: 91 MG/DL (ref 74–99)
LDL: 84 MG/DL (ref 0–99)
POTASSIUM (MMOL/L) IN SER/PLAS: 4.2 MMOL/L (ref 3.5–5.3)
PROTEIN TOTAL: 6.5 G/DL (ref 6.4–8.2)
SODIUM (MMOL/L) IN SER/PLAS: 143 MMOL/L (ref 136–145)
THYROTROPIN (MIU/L) IN SER/PLAS BY DETECTION LIMIT <= 0.05 MIU/L: 0.4 MIU/L (ref 0.44–3.98)
THYROXINE (T4) FREE (NG/DL) IN SER/PLAS: 0.77 NG/DL (ref 0.78–1.48)
TRIGLYCERIDE (MG/DL) IN SER/PLAS: 71 MG/DL (ref 0–149)
UREA NITROGEN (MG/DL) IN SER/PLAS: 18 MG/DL (ref 6–23)
VLDL: 14 MG/DL (ref 0–40)

## 2023-10-07 ENCOUNTER — OFFICE VISIT (OUTPATIENT)
Dept: PRIMARY CARE | Facility: CLINIC | Age: 68
End: 2023-10-07
Payer: COMMERCIAL

## 2023-10-07 VITALS
WEIGHT: 251 LBS | BODY MASS INDEX: 42.85 KG/M2 | HEIGHT: 64 IN | DIASTOLIC BLOOD PRESSURE: 74 MMHG | SYSTOLIC BLOOD PRESSURE: 122 MMHG

## 2023-10-07 DIAGNOSIS — M25.511 ACUTE PAIN OF RIGHT SHOULDER: Primary | ICD-10-CM

## 2023-10-07 DIAGNOSIS — M54.9 BACK PAIN, UNSPECIFIED BACK LOCATION, UNSPECIFIED BACK PAIN LATERALITY, UNSPECIFIED CHRONICITY: ICD-10-CM

## 2023-10-07 DIAGNOSIS — N62 BREAST HYPERTROPHY: ICD-10-CM

## 2023-10-07 DIAGNOSIS — Z23 ENCOUNTER FOR IMMUNIZATION: ICD-10-CM

## 2023-10-07 DIAGNOSIS — E11.69 DIABETES MELLITUS TYPE 2 IN OBESE: ICD-10-CM
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
## 2023-10-07 DIAGNOSIS — E66.9 DIABETES MELLITUS TYPE 2 IN OBESE: ICD-10-CM

## 2023-10-07 PROCEDURE — 90471 IMMUNIZATION ADMIN: CPT | Performed by: INTERNAL MEDICINE

## 2023-10-07 PROCEDURE — 1126F AMNT PAIN NOTED NONE PRSNT: CPT | Performed by: INTERNAL MEDICINE

## 2023-10-07 PROCEDURE — 3078F DIAST BP <80 MM HG: CPT | Performed by: INTERNAL MEDICINE

## 2023-10-07 PROCEDURE — 1159F MED LIST DOCD IN RCRD: CPT | Performed by: INTERNAL MEDICINE

## 2023-10-07 PROCEDURE — 1036F TOBACCO NON-USER: CPT | Performed by: INTERNAL MEDICINE

## 2023-10-07 PROCEDURE — 90662 IIV NO PRSV INCREASED AG IM: CPT | Performed by: INTERNAL MEDICINE

## 2023-10-07 PROCEDURE — 4010F ACE/ARB THERAPY RXD/TAKEN: CPT | Performed by: INTERNAL MEDICINE

## 2023-10-07 PROCEDURE — 3074F SYST BP LT 130 MM HG: CPT | Performed by: INTERNAL MEDICINE

## 2023-10-07 PROCEDURE — 99214 OFFICE O/P EST MOD 30 MIN: CPT | Performed by: INTERNAL MEDICINE

## 2023-10-07 PROCEDURE — 3044F HG A1C LEVEL LT 7.0%: CPT | Performed by: INTERNAL MEDICINE

## 2023-10-07 RX ORDER — CYCLOBENZAPRINE HCL 10 MG
10 TABLET ORAL 3 TIMES DAILY PRN
Qty: 30 TABLET | Refills: 0 | Status: SHIPPED | OUTPATIENT
Start: 2023-10-07 | End: 2024-02-22 | Stop reason: WASHOUT

## 2023-10-07 RX ORDER — DICLOFENAC SODIUM 10 MG/G
4 GEL TOPICAL 4 TIMES DAILY
Qty: 100 G | Refills: 1 | Status: SHIPPED | OUTPATIENT
Start: 2023-10-07 | End: 2024-02-22 | Stop reason: WASHOUT

## 2023-10-07 ASSESSMENT — ENCOUNTER SYMPTOMS
LOSS OF SENSATION IN FEET: 0
DEPRESSION: 0
OCCASIONAL FEELINGS OF UNSTEADINESS: 0

## 2023-10-07 NOTE — PROGRESS NOTES
Subjective   Patient ID: Stefanie Garnett is a 68 y.o. female who presents for Follow-up and Med Refill.    Med Refill    Patient here for follow-up  Did blood work  Got new CPAP machine  She is using her CPAP machine regularly  Follow-up on hypertension high cholesterol  Having a lot of acid  Not able to lose weight.  Does not follow diet  Does not exercise  Fell 2 weeks ago and hurting on the right shoulder.  Range of motion of the shoulder is normal  She is gaining weight and her breast size is getting weightbearing and causing her back pain wants with reduction surgerypast recap  Did pulmonary function test  Did 2D echo  She needs clearance from cardiologist for her work     Patient is here for follow-up on blood work  She quit taking her metformin  She quit using CPAP machine because of the cough  She did pulmonary function test but results are not available  She saw the allergist and sleep doctor who recommended her to wash the tubing to avoid coughing  Patient coughs only at night during the day she is fine  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  Patient has not lost weight  She has not changed her eating habits yet        Patient is here for follow-up on obstructive sleep apnea  She is using the CPAP machine it is helping her  Follow-up on hypertension high cholesterol coronary artery disease   needs medication refill  Not watching diet gained weight     Needs flu shot  Overdue for GYN exam  She is seriously considering gastric bypass surgery because she is not able to lose weight she wants insurance papers filled for her hospital stay for MI     She was not using her CPAP machine  Came to the hospital with chest pain and non-ST elevation MI had repeat cardiac catheter done did not show any significant blockage  Patient was treated with antibiotic and steroid and feeling better  Her blood pressure was high and Norvasc was started She has degenerative changes in  "the lower back so she cannot do heavy lifting     Review of Systems    Objective   /74   Ht 1.626 m (5' 4\")   Wt 114 kg (251 lb)   BMI 43.08 kg/m²     Physical Exam  Vitals reviewed.   Constitutional:       Appearance: Normal appearance.   HENT:      Head: Normocephalic and atraumatic.      Right Ear: Tympanic membrane, ear canal and external ear normal.      Left Ear: Tympanic membrane, ear canal and external ear normal.      Nose: Nose normal.      Mouth/Throat:      Pharynx: Oropharynx is clear.   Eyes:      Extraocular Movements: Extraocular movements intact.      Conjunctiva/sclera: Conjunctivae normal.      Pupils: Pupils are equal, round, and reactive to light.   Cardiovascular:      Rate and Rhythm: Normal rate and regular rhythm.      Pulses: Normal pulses.      Heart sounds: Normal heart sounds.   Pulmonary:      Effort: Pulmonary effort is normal.      Breath sounds: Normal breath sounds.   Abdominal:      General: Abdomen is flat. Bowel sounds are normal.      Palpations: Abdomen is soft.   Musculoskeletal:      Cervical back: Normal range of motion and neck supple.   Skin:     General: Skin is warm and dry.   Neurological:      General: No focal deficit present.      Mental Status: She is alert and oriented to person, place, and time.   Psychiatric:         Mood and Affect: Mood normal.       Assessment/Plan   Problem List Items Addressed This Visit             ICD-10-CM       Musculoskeletal and Injuries    Back pain M54.9    Relevant Medications    cyclobenzaprine (Flexeril) 10 mg tablet    diclofenac sodium (Voltaren) 1 % gel gel     Other Visit Diagnoses         Codes    Encounter for immunization     Z23    Relevant Orders    Flu vaccine, quadrivalent, high-dose, preservative free, age 65y+ (FLUZONE) (Completed)    Breast hypertrophy     N62    Relevant Orders    Referral to Plastic Surgery          past recap  1. CAD. Had NSTEMI August 2015. Catheter showed no definite high-grade culprit " lesion that would necessitate intervention. There was some moderate disease in the distal aspect of the first marginal branch of the LCx, but otherwise the patient had very minimal CAD. Echocardiogram done June 2017 showed an EF of 60-65%. Patient had repeat exercise nuclear stress test June 2017 which was negative for ischemia. Patient had again acute MI cardiac catheter did not show any blockage. MI was attributed to coronary spasm     2. Hypertension. Adequately controlled      3. Hyperlipidemia. . Continue atorvastatin 80 mg daily as well as Zetia 10 mg daily.     4. Obesity. Weight loss was encouraged. Refer to Dr. Skip Suarez for considering gastric bypass     5. Sleep apnea. Using CPAP     6. Carotid ultrasound. No hemodynamically significant stenosis when done June 2017.     7. Diabetes  A1c 6.4  Doing better  Continue Metformin     #8 acute sinusitis  Treat with Z-Tien     Flu shot given  Mammogram ordered  Meds refilled for 6 months          6/27/2023  Blood work reviewed  A1c 6.3 slightly up  Cholesterol under control  Patient is using CPAP with improvement in symptoms uses every night 4 to 6 hours  Patient is not able to lose weight again discussed various options  Encourage diet and exercise   10/7/2023  X-ray of the right shoulder not needed as joint has full range of motion  Treat with Flexeril and Voltaren gel topically  Refer to plastics for breast hypertrophy  Blood work reviewed  Diabetes under control at 6.2  Cholesterol under control  Blood pressure stable TSH is slightly out of range which has happened in the past  We will monitor for now  Discussed diet and exercise to lose weight  We will try Wegovy versus Mounjaro what ever insurance covers to help her lose weight  Using CPAP  Follow-up blood work in 3 months

## 2023-10-09 RX ORDER — TIRZEPATIDE 2.5 MG/.5ML
2.5 INJECTION, SOLUTION SUBCUTANEOUS
Qty: 3 ML | Refills: 0 | Status: SHIPPED | OUTPATIENT
Start: 2023-10-09 | End: 2023-10-31 | Stop reason: ALTCHOICE

## 2023-10-24 NOTE — PROGRESS NOTES
HPI: Patient with Macromastia c/b shoulder and back pain with numbness and tingling. She reports inhibiting exercise and daily activities. Patient takes Aleve PRN for back pain.     PMhx: heart attack in 2017, ANGELICA, HTN, pre-diabetic HbA1c in September 2023 6.4, obesity  Pshx: hysterectomy   Social: not a Smoker  Mammogram: November 2022    Breast size: 46 DDD      Review of Systems   All other systems reviewed and are negative.        Objective   Physical Exam   Focused exam of the back shows bra strap grooving, trapezial hypertrophy, and back and shoulder pain.    Gigantomastia, ptosis grade III.  Bra Size: 46 DDD     Assessment/Plan     Patient presents with symptomatic macromastia which is causing her back pain, bra strap grooving, trapezial hypertrophy. The patient BMI is high at 42.74.   In my opinion, reduction mammaplasty while can be considered, is not indicated as of yet. I recommended physical therapy to address her back pain, and I recommended weight management to minimize risks associated with high BMI.     Patient is currently prescribed monjaro from PCP for the past 3 weeks and has only lost 2lbs. She will follow up with her PCP.  Her most recent mammogram was November 2022 and negative.    Plan:  - Complete yearly mammogram - PCP to order  - Follow up with PCP for adjustments to monjaro for goal BMI below 35   - Physical therapy referral

## 2023-10-30 ENCOUNTER — OFFICE VISIT (OUTPATIENT)
Dept: PLASTIC SURGERY | Facility: CLINIC | Age: 68
End: 2023-10-30
Payer: COMMERCIAL

## 2023-10-30 ENCOUNTER — HOSPITAL ENCOUNTER (OUTPATIENT)
Dept: RADIOLOGY | Facility: EXTERNAL LOCATION | Age: 68
Discharge: HOME | End: 2023-10-30

## 2023-10-30 VITALS
HEIGHT: 64 IN | BODY MASS INDEX: 42.51 KG/M2 | WEIGHT: 249 LBS | TEMPERATURE: 98 F | HEART RATE: 79 BPM | DIASTOLIC BLOOD PRESSURE: 79 MMHG | SYSTOLIC BLOOD PRESSURE: 124 MMHG

## 2023-10-30 DIAGNOSIS — M54.9 CHRONIC BACK PAIN, UNSPECIFIED BACK LOCATION, UNSPECIFIED BACK PAIN LATERALITY: ICD-10-CM

## 2023-10-30 DIAGNOSIS — M25.511 ACUTE PAIN OF RIGHT SHOULDER: ICD-10-CM

## 2023-10-30 DIAGNOSIS — G89.29 CHRONIC BACK PAIN, UNSPECIFIED BACK LOCATION, UNSPECIFIED BACK PAIN LATERALITY: ICD-10-CM

## 2023-10-30 DIAGNOSIS — N62 BREAST HYPERTROPHY: ICD-10-CM

## 2023-10-30 PROCEDURE — 3074F SYST BP LT 130 MM HG: CPT

## 2023-10-30 PROCEDURE — 1036F TOBACCO NON-USER: CPT

## 2023-10-30 PROCEDURE — 4010F ACE/ARB THERAPY RXD/TAKEN: CPT

## 2023-10-30 PROCEDURE — 1125F AMNT PAIN NOTED PAIN PRSNT: CPT

## 2023-10-30 PROCEDURE — 99203 OFFICE O/P NEW LOW 30 MIN: CPT

## 2023-10-30 PROCEDURE — 1159F MED LIST DOCD IN RCRD: CPT

## 2023-10-30 PROCEDURE — 3044F HG A1C LEVEL LT 7.0%: CPT

## 2023-10-30 PROCEDURE — 3078F DIAST BP <80 MM HG: CPT

## 2023-10-30 ASSESSMENT — PAIN SCALES - GENERAL: PAINLEVEL: 8

## 2023-10-30 NOTE — PATIENT INSTRUCTIONS
Important Contact Numbers  Division of Plastic Surgery      [phone]  Appointment Scheduling     Option #1  /Surgical Coordinator- Winsome Doherty  Option #3  Clinical Coordinator- Edilberto SUMMERS   Option #5  Financial Counselor-Insurance/Cosmetic Surgery- [place]  [phone]  After Hours Answering Service     [phone]  Office Fax        [phone]  Radiology Scheduling       [phone]  Physical Therapy Scheduling       [phone]  Referrals         [phone]

## 2023-10-31 ENCOUNTER — OFFICE VISIT (OUTPATIENT)
Dept: PRIMARY CARE | Facility: CLINIC | Age: 68
End: 2023-10-31
Payer: COMMERCIAL

## 2023-10-31 VITALS
BODY MASS INDEX: 42 KG/M2 | DIASTOLIC BLOOD PRESSURE: 72 MMHG | SYSTOLIC BLOOD PRESSURE: 124 MMHG | HEIGHT: 64 IN | WEIGHT: 246 LBS

## 2023-10-31 DIAGNOSIS — R63.5 WEIGHT GAIN: ICD-10-CM

## 2023-10-31 DIAGNOSIS — K59.00 CONSTIPATION, UNSPECIFIED CONSTIPATION TYPE: ICD-10-CM

## 2023-10-31 DIAGNOSIS — Z12.31 SCREENING MAMMOGRAM, ENCOUNTER FOR: ICD-10-CM

## 2023-10-31 PROCEDURE — 1036F TOBACCO NON-USER: CPT | Performed by: INTERNAL MEDICINE

## 2023-10-31 PROCEDURE — 1125F AMNT PAIN NOTED PAIN PRSNT: CPT | Performed by: INTERNAL MEDICINE

## 2023-10-31 PROCEDURE — 3078F DIAST BP <80 MM HG: CPT | Performed by: INTERNAL MEDICINE

## 2023-10-31 PROCEDURE — 99214 OFFICE O/P EST MOD 30 MIN: CPT | Performed by: INTERNAL MEDICINE

## 2023-10-31 PROCEDURE — 4010F ACE/ARB THERAPY RXD/TAKEN: CPT | Performed by: INTERNAL MEDICINE

## 2023-10-31 PROCEDURE — 3074F SYST BP LT 130 MM HG: CPT | Performed by: INTERNAL MEDICINE

## 2023-10-31 PROCEDURE — 3044F HG A1C LEVEL LT 7.0%: CPT | Performed by: INTERNAL MEDICINE

## 2023-10-31 PROCEDURE — 1159F MED LIST DOCD IN RCRD: CPT | Performed by: INTERNAL MEDICINE

## 2023-10-31 RX ORDER — TIRZEPATIDE 5 MG/.5ML
5 INJECTION, SOLUTION SUBCUTANEOUS
Qty: 2 ML | Refills: 0 | Status: SHIPPED | OUTPATIENT
Start: 2023-10-31 | End: 2023-11-02 | Stop reason: SDUPTHER

## 2023-10-31 RX ORDER — DOCUSATE SODIUM 100 MG/1
100 CAPSULE, LIQUID FILLED ORAL 2 TIMES DAILY
Qty: 60 CAPSULE | Refills: 0 | Status: SHIPPED | OUTPATIENT
Start: 2023-10-31 | End: 2024-02-22 | Stop reason: WASHOUT

## 2023-10-31 RX ORDER — LOSARTAN POTASSIUM 100 MG/1
100 TABLET ORAL DAILY
COMMUNITY
Start: 2023-10-05

## 2023-10-31 ASSESSMENT — ENCOUNTER SYMPTOMS
DEPRESSION: 0
OCCASIONAL FEELINGS OF UNSTEADINESS: 0
LOSS OF SENSATION IN FEET: 0

## 2023-10-31 NOTE — PROGRESS NOTES
Subjective   Patient ID: Stefanie Garnett is a 68 y.o. female who presents for Follow-up.    Med Refill    Patient had appointment with the plastic surgeon  She is going for breast reduction  She needs to lose some weight and she also needs mammogram  Mounjaro is helping to lose some weight she lost 5 pounds  She wants to increase the dose as she is tolerating it well  She is having problems with constipation for Mounjaro she was in the urgent care  Because she slid down the stairs and hurt the right shoulder was not able to move x-ray was negative     patient here for follow-up  Did blood work  Got new CPAP machine  She is using her CPAP machine regularly  Follow-up on hypertension high cholesterol  Having a lot of acid  Not able to lose weight.  Does not follow diet  Does not exercise  Fell 2 weeks ago and hurting on the right shoulder.  Range of motion of the shoulder is normal  She is gaining weight and her breast size is getting weightbearing and causing her back pain wants with reduction surgerypast recap  Did pulmonary function test  Did 2D echo  She needs clearance from cardiologist for her work     Patient is here for follow-up on blood work  She quit taking her metformin  She quit using CPAP machine because of the cough  She did pulmonary function test but results are not available  She saw the allergist and sleep doctor who recommended her to wash the tubing to avoid coughing  Patient coughs only at night during the day she is fine  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  Patient has not lost weight  She has not changed her eating habits yet        Patient is here for follow-up on obstructive sleep apnea  She is using the CPAP machine it is helping her  Follow-up on hypertension high cholesterol coronary artery disease   needs medication refill  Not watching diet gained weight     Needs flu shot  Overdue for GYN exam  She is seriously considering gastric bypass  "surgery because she is not able to lose weight she wants insurance papers filled for her hospital stay for MI     She was not using her CPAP machine  Came to the hospital with chest pain and non-ST elevation MI had repeat cardiac catheter done did not show any significant blockage  Patient was treated with antibiotic and steroid and feeling better  Her blood pressure was high and Norvasc was started She has degenerative changes in the lower back so she cannot do heavy lifting     Review of Systems    Objective   /72   Ht 1.626 m (5' 4\")   Wt 112 kg (246 lb)   BMI 42.23 kg/m²     Physical Exam  Vitals reviewed.   Constitutional:       Appearance: Normal appearance. She is obese.   HENT:      Head: Normocephalic and atraumatic.      Right Ear: Tympanic membrane, ear canal and external ear normal.      Left Ear: Tympanic membrane, ear canal and external ear normal.      Nose: Nose normal.      Mouth/Throat:      Pharynx: Oropharynx is clear.   Eyes:      Extraocular Movements: Extraocular movements intact.      Conjunctiva/sclera: Conjunctivae normal.      Pupils: Pupils are equal, round, and reactive to light.   Cardiovascular:      Rate and Rhythm: Normal rate and regular rhythm.      Pulses: Normal pulses.      Heart sounds: Normal heart sounds.   Pulmonary:      Effort: Pulmonary effort is normal.      Breath sounds: Normal breath sounds.   Abdominal:      General: Abdomen is flat. Bowel sounds are normal.      Palpations: Abdomen is soft.   Musculoskeletal:      Cervical back: Normal range of motion and neck supple.   Skin:     General: Skin is warm and dry.   Neurological:      General: No focal deficit present.      Mental Status: She is alert and oriented to person, place, and time.   Psychiatric:         Mood and Affect: Mood normal.         Assessment/Plan   Problem List Items Addressed This Visit    None  Visit Diagnoses         Codes    Screening mammogram, encounter for     Z12.31    Relevant " Orders    BI mammo bilateral screening tomosynthesis    Weight gain     R63.5    Relevant Medications    tirzepatide (Mounjaro) 5 mg/0.5 mL pen injector    Constipation, unspecified constipation type     K59.00    Relevant Medications    docusate sodium (Colace) 100 mg capsule          past recap  1. CAD. Had NSTEMI August 2015. Catheter showed no definite high-grade culprit lesion that would necessitate intervention. There was some moderate disease in the distal aspect of the first marginal branch of the LCx, but otherwise the patient had very minimal CAD. Echocardiogram done June 2017 showed an EF of 60-65%. Patient had repeat exercise nuclear stress test June 2017 which was negative for ischemia. Patient had again acute MI cardiac catheter did not show any blockage. MI was attributed to coronary spasm     2. Hypertension. Adequately controlled      3. Hyperlipidemia. . Continue atorvastatin 80 mg daily as well as Zetia 10 mg daily.     4. Obesity. Weight loss was encouraged. Refer to Dr. Skip Suarez for considering gastric bypass     5. Sleep apnea. Using CPAP     6. Carotid ultrasound. No hemodynamically significant stenosis when done June 2017.     7. Diabetes  A1c 6.4  Doing better  Continue Metformin     #8 acute sinusitis  Treat with Z-Tien     Flu shot given  Mammogram ordered  Meds refilled for 6 months          6/27/2023  Blood work reviewed  A1c 6.3 slightly up  Cholesterol under control  Patient is using CPAP with improvement in symptoms uses every night 4 to 6 hours  Patient is not able to lose weight again discussed various options  Encourage diet and exercise   10/7/2023  X-ray of the right shoulder not needed as joint has full range of motion  Treat with Flexeril and Voltaren gel topically  Refer to plastics for breast hypertrophy  Blood work reviewed  Diabetes under control at 6.2  Cholesterol under control  Blood pressure stable TSH is slightly out of range which has happened in the past  We will  monitor for now  Discussed diet and exercise to lose weight  We will try Wegovy versus Mounjaro what ever insurance covers to help her lose weight  Using CPAP  Follow-up blood work in 3 months    10/31/2023  Will increase dose of Mounjaro  Again discussed diet and exercise  Mammogram ordered  Call us 100 twice a day for constipation  She had a fall she slid down the stairs went to the urgent care and her right shoulder she hurt but now she is able to move it  Examination is normal  Follow-up in a month

## 2023-11-02 DIAGNOSIS — E11.9 TYPE 2 DIABETES MELLITUS WITHOUT COMPLICATION, WITHOUT LONG-TERM CURRENT USE OF INSULIN (MULTI): ICD-10-CM

## 2023-11-02 DIAGNOSIS — R63.5 WEIGHT GAIN: ICD-10-CM

## 2023-11-02 RX ORDER — TIRZEPATIDE 5 MG/.5ML
5 INJECTION, SOLUTION SUBCUTANEOUS
Qty: 2 ML | Refills: 0 | Status: SHIPPED | OUTPATIENT
Start: 2023-11-02 | End: 2023-11-27 | Stop reason: SDUPTHER

## 2023-11-07 ENCOUNTER — TELEPHONE (OUTPATIENT)
Dept: ALLERGY | Facility: CLINIC | Age: 68
End: 2023-11-07
Payer: COMMERCIAL

## 2023-11-15 ENCOUNTER — CONSULT (OUTPATIENT)
Dept: ALLERGY | Facility: CLINIC | Age: 68
End: 2023-11-15
Payer: COMMERCIAL

## 2023-11-15 VITALS
OXYGEN SATURATION: 96 % | SYSTOLIC BLOOD PRESSURE: 139 MMHG | DIASTOLIC BLOOD PRESSURE: 84 MMHG | HEIGHT: 64 IN | WEIGHT: 244 LBS | HEART RATE: 82 BPM | BODY MASS INDEX: 41.66 KG/M2

## 2023-11-15 DIAGNOSIS — G47.33 OSA (OBSTRUCTIVE SLEEP APNEA): Primary | ICD-10-CM

## 2023-11-15 DIAGNOSIS — I10 BENIGN HYPERTENSION: ICD-10-CM

## 2023-11-15 DIAGNOSIS — I25.10 ARTERIOSCLEROSIS OF CORONARY ARTERY: ICD-10-CM

## 2023-11-15 PROCEDURE — 99214 OFFICE O/P EST MOD 30 MIN: CPT | Performed by: ALLERGY & IMMUNOLOGY

## 2023-11-15 NOTE — PROGRESS NOTES
"Subjective   Patient ID:   [id number]   Stefanie Garnett is a 68 y.o. female who presents for Sleep Study (New patient).    Chief Complaint   Patient presents with    Sleep Study     New patient       HPI  This patient is here to evaluate for:    She was last vis  Obstructive Sleep Apnea   This was dx 5-6 years ago.   Got a   She was supposed to get supplies.   She had the newer machine from the recall.     She is a   She is sleepy during the day.     Monday she is exhausted so     She doesn't drink coffee.     Bed 8-9pm  UP at 4:30am  Nocturia 1 time.   Punches in 5:57am  Done at 9am  Then starts 11:30  12:39    Then 1:42 and finished by 4pm.     In between, she Naps - power for 20 mins.     APAP 10-20  Flex 2  Ramp 30 min  Usage 7:08 hr  AHI 1  90%ile 12.1.    Review of Systems   All other systems reviewed and are negative.        Objective     /84   Pulse 82   Ht 1.626 m (5' 4\")   Wt 111 kg (244 lb)   SpO2 96%   BMI 41.88 kg/m²      Physical Exam  Vitals and nursing note reviewed.   Constitutional:       Appearance: Normal appearance. She is normal weight.   HENT:      Head: Normocephalic and atraumatic.      Right Ear: External ear normal.      Left Ear: External ear normal.      Nose: No septal deviation, congestion or rhinorrhea.      Right Turbinates: Not enlarged, swollen or pale.      Left Turbinates: Not enlarged, swollen or pale.      Mouth/Throat:      Mouth: Mucous membranes are moist.   Eyes:      Extraocular Movements: Extraocular movements intact.      Conjunctiva/sclera: Conjunctivae normal.      Pupils: Pupils are equal, round, and reactive to light.   Cardiovascular:      Rate and Rhythm: Normal rate and regular rhythm.      Pulses: Normal pulses.      Heart sounds: Normal heart sounds.   Pulmonary:      Effort: Pulmonary effort is normal.      Breath sounds: Normal breath sounds. No wheezing, rhonchi or rales.   Musculoskeletal:         General: Normal range of " motion.   Skin:     General: Skin is warm and dry.      Findings: No erythema or rash.   Neurological:      General: No focal deficit present.      Mental Status: She is alert and oriented to person, place, and time.   Psychiatric:         Mood and Affect: Mood normal.         Behavior: Behavior normal.            Current Outpatient Medications   Medication Sig Dispense Refill    amLODIPine (Norvasc) 5 mg tablet Take 1 tablet (5 mg) by mouth once daily. 90 tablet 0    aspirin-calcium carbonate 81 mg-300 mg calcium(777 mg) tablet Take 1 tablet by mouth in the morning.      atorvastatin (Lipitor) 80 mg tablet Take 1 tablet (80 mg) by mouth once daily at bedtime. 90 tablet 0    cholecalciferol (Vitamin D-3) 50 MCG (2000 UT) tablet Take 1 tablet (2,000 Units) by mouth once daily.      cyclobenzaprine (Flexeril) 10 mg tablet Take 1 tablet (10 mg) by mouth 3 times a day as needed for muscle spasms. 30 tablet 0    diclofenac sodium (Voltaren) 1 % gel gel Apply 1 Application topically 4 times a day. 100 g 1    docusate sodium (Colace) 100 mg capsule Take 1 capsule (100 mg) by mouth 2 times a day. 60 capsule 0    evolocumab (Repatha SureClick) 140 mg/mL injection INJECT 140 MG (1 PEN) UNDER THE SKIN ONCE EVERY 2 WEEKS AS DIRECTED. 6 mL 6    ezetimibe (Zetia) 10 mg tablet Take 1 tablet (10 mg) by mouth once daily. 90 tablet 0    fluticasone (Flonase) 50 mcg/actuation nasal spray Administer 2 sprays into each nostril once daily.      losartan (Cozaar) 100 mg tablet Take 1 tablet (100 mg) by mouth once daily.      metFORMIN (Glucophage) 1,000 mg tablet Take 1 tablet (1,000 mg) by mouth 2 times a day. 180 tablet 0    metoprolol tartrate (Lopressor) 50 mg tablet Take 1 tablet (50 mg) by mouth 2 times a day. 180 tablet 0    omega 3-dha-epa-fish oil 360 mg-108 mg- 180 mg-1,200 mg capsule Take 1 capsule by mouth once daily.      pantoprazole (ProtoNix) 40 mg EC tablet TAKE 1 TABLET BY MOUTH ONCE DAILY 90 tablet 0    tirzepatide  "(Mounjaro) 5 mg/0.5 mL pen injector Inject 5 mg under the skin 1 (one) time per week. 2 mL 0    lisinopril 20 mg tablet Take 1 tablet (20 mg) by mouth once daily. as directed (Patient not taking: Reported on 11/15/2023) 90 tablet 0     No current facility-administered medications for this visit.        No components found for: \"LASTLABSF\"       Assessment/Plan   Diagnoses and all orders for this visit:  ANGELICA (obstructive sleep apnea)  Benign hypertension      No orders of the defined types were placed in this encounter.    The Obstructive sleep apnea is well treated with CPAP usage and the usage (compliance) of CPAP is good. This patient is benefiting from using PAP and it is medically necessary. Recommend continuing the current plan and machine settings.    Obstructive sleep apnea (ANGELICA) is a risk factor for impairing one's ability to function on daily activities such as driving, focus and mental well-being.  And ANGELICA is a risk factor and can lead to chronic health conditions and cardiovascular complications such as heart disease, high blood pressure, diabetes, heart attacks and stroke. CPAP has been shown to treat and prevent these conditions by treating the obstructive sleep apnea. This patient has HTN and CAD  and we are addressing its treatment with CPAP. The ANGELICA is WELL CONTROLLED and management with CPAP is reducing these risks.    Recommend caffeine to help you stay awake.    More exercise will also be a stimulant to keep you awake.    If that is not enough, you can discuss a stimulant with your PCP.        Ahmet Prado MD       "

## 2023-11-16 ENCOUNTER — APPOINTMENT (OUTPATIENT)
Dept: RADIOLOGY | Facility: CLINIC | Age: 68
End: 2023-11-16
Payer: COMMERCIAL

## 2023-11-27 ENCOUNTER — EVALUATION (OUTPATIENT)
Dept: PHYSICAL THERAPY | Facility: CLINIC | Age: 68
End: 2023-11-27
Payer: COMMERCIAL

## 2023-11-27 ENCOUNTER — OFFICE VISIT (OUTPATIENT)
Dept: PRIMARY CARE | Facility: CLINIC | Age: 68
End: 2023-11-27
Payer: COMMERCIAL

## 2023-11-27 VITALS
DIASTOLIC BLOOD PRESSURE: 76 MMHG | BODY MASS INDEX: 40.29 KG/M2 | HEIGHT: 64 IN | SYSTOLIC BLOOD PRESSURE: 128 MMHG | WEIGHT: 236 LBS

## 2023-11-27 VITALS — SYSTOLIC BLOOD PRESSURE: 132 MMHG | DIASTOLIC BLOOD PRESSURE: 83 MMHG | HEART RATE: 91 BPM

## 2023-11-27 DIAGNOSIS — M54.50 CHRONIC BILATERAL LOW BACK PAIN WITHOUT SCIATICA: Primary | ICD-10-CM

## 2023-11-27 DIAGNOSIS — M54.9 CHRONIC BACK PAIN, UNSPECIFIED BACK LOCATION, UNSPECIFIED BACK PAIN LATERALITY: ICD-10-CM

## 2023-11-27 DIAGNOSIS — G89.29 CHRONIC BILATERAL LOW BACK PAIN WITHOUT SCIATICA: Primary | ICD-10-CM

## 2023-11-27 DIAGNOSIS — G89.29 CHRONIC BACK PAIN, UNSPECIFIED BACK LOCATION, UNSPECIFIED BACK PAIN LATERALITY: ICD-10-CM

## 2023-11-27 DIAGNOSIS — E11.9 TYPE 2 DIABETES MELLITUS WITHOUT COMPLICATION, WITHOUT LONG-TERM CURRENT USE OF INSULIN (MULTI): ICD-10-CM

## 2023-11-27 DIAGNOSIS — N62 BREAST HYPERTROPHY: ICD-10-CM

## 2023-11-27 PROCEDURE — 97161 PT EVAL LOW COMPLEX 20 MIN: CPT | Mod: GP | Performed by: PHYSICAL THERAPIST

## 2023-11-27 PROCEDURE — 1159F MED LIST DOCD IN RCRD: CPT | Performed by: INTERNAL MEDICINE

## 2023-11-27 PROCEDURE — 97140 MANUAL THERAPY 1/> REGIONS: CPT | Mod: GP | Performed by: PHYSICAL THERAPIST

## 2023-11-27 PROCEDURE — 3078F DIAST BP <80 MM HG: CPT | Performed by: INTERNAL MEDICINE

## 2023-11-27 PROCEDURE — 99213 OFFICE O/P EST LOW 20 MIN: CPT | Performed by: INTERNAL MEDICINE

## 2023-11-27 PROCEDURE — 1036F TOBACCO NON-USER: CPT | Performed by: INTERNAL MEDICINE

## 2023-11-27 PROCEDURE — 3044F HG A1C LEVEL LT 7.0%: CPT | Performed by: INTERNAL MEDICINE

## 2023-11-27 PROCEDURE — 4010F ACE/ARB THERAPY RXD/TAKEN: CPT | Performed by: INTERNAL MEDICINE

## 2023-11-27 PROCEDURE — 1125F AMNT PAIN NOTED PAIN PRSNT: CPT | Performed by: INTERNAL MEDICINE

## 2023-11-27 PROCEDURE — 97110 THERAPEUTIC EXERCISES: CPT | Mod: GP | Performed by: PHYSICAL THERAPIST

## 2023-11-27 PROCEDURE — 3074F SYST BP LT 130 MM HG: CPT | Performed by: INTERNAL MEDICINE

## 2023-11-27 RX ORDER — TIRZEPATIDE 5 MG/.5ML
5 INJECTION, SOLUTION SUBCUTANEOUS
Qty: 2 ML | Refills: 0 | Status: SHIPPED | OUTPATIENT
Start: 2023-11-27 | End: 2024-02-22 | Stop reason: WASHOUT

## 2023-11-27 ASSESSMENT — ENCOUNTER SYMPTOMS
OCCASIONAL FEELINGS OF UNSTEADINESS: 0
LOSS OF SENSATION IN FEET: 0
DEPRESSION: 0
OCCASIONAL FEELINGS OF UNSTEADINESS: 0
DEPRESSION: 0
LOSS OF SENSATION IN FEET: 0

## 2023-11-27 ASSESSMENT — PAIN - FUNCTIONAL ASSESSMENT: PAIN_FUNCTIONAL_ASSESSMENT: 0-10

## 2023-11-27 ASSESSMENT — PAIN SCALES - GENERAL: PAINLEVEL_OUTOF10: 0 - NO PAIN

## 2023-11-27 NOTE — PROGRESS NOTES
"      Physical Therapy  Physical Therapy Orthopedic Evaluation      Patient Name: Stefanie Garnett  MRN: [id number]  Today's Date: 11/27/2023  Time Calculation  Start Time: 1005  Stop Time: 1052  Time Calculation (min): 47 min    Insurance:    Number of Treatments Authorized: 1 of        Insurance Type: Payor: GENERIC COMMERCIAL / Plan: GENERIC COMMERCIAL / Product Type: *No Product type* /     Current Problem  1. Chronic bilateral low back pain without sciatica  Follow Up In Physical Therapy      2. Breast hypertrophy  Referral to Physical Therapy      3. Chronic back pain, unspecified back location, unspecified back pain laterality  Referral to Physical Therapy          General:  Reason for Referral: Low Back Pain  Referred By: Dr. Lizz Lew    Past Medical History  Past Medical History Relevant to Rehab: HTN    Precautions:   Precautions  STEADI Fall Risk Score (The score of 4 or more indicates an increased risk of falling): 4  Precautions Comment: Moderate Fall Risk    Medical History Form: Reviewed (scanned into chart)    Subjective:   Subjective   General Comment: Patient reports that she's been having low back pain for a long time but has been hurting more recently with prolonged positining such as standing or sitting. 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 She also reports intermittent back and R anterior lower symptoms with prolonged standing.    Onset Date: Onset Date: 01/01/23    Current Condition:   Better    Pain:  Pain Assessment: 0-10  Pain Score: 0 - No pain  Pain Location: Back (#1  (I,V): Lower lumbar spine, 0-9/10, \"sharp/aching\"; #2 (I): R anterior thigh, \"numbness\")  Effect of Pain on Daily Activities: Sitting > 20-30 mins increases #1 when standing to walk for < 5-10 mins; Standing > 15-20 mins inc #1/#2 // relieves when OOP x 10 mins    Relevant Information (PMH & Previous Tests/Imaging): HTN     Previous Interventions/Treatments: None Comment: " Medical Management: X-rays; Medication - muscle relaxers; HTN medication    Patients Living Environment: Home Living Comment: Multiple Story Home    Primary Language: English    Patient's Goal(s) for Therapy: Decrease pain to allow for improved tolerance to functional tasks    Red Flags: Do you have any of the following?         Red Flags: None    Objective:  Objective   Lumbar Spine    Functional Rating Scale     Observation  Lumbar: Increased lumbar lordosis with anterior pelvic tilt  Observation:: Increased difficulty with bed mobility due to right shoulder pain.  Lumbar Palpation/Joint Mobility Assessment  Palpation/Joint Mobility Comment: Tenderness R>L lower lumbar spine  Lumbar AROM  Lumbar flexion: (60°): 50° (Gowers return to stand)  Lumbar extension (25°): 15° (#1)  Lumbar sidebend right (25°): 15°  Lumbar sidebend left (25°): 15°    Lumbar Myotomes  Lumbar Myotomes WFL: yes  Specific Lower Extremity MMT  R Iliopsoas: (5/5): 4/5  L Iliopsoas: (5/5): 4/5  R Gluteals (prone): (5/5): 4+/5 (Tested in supine)  L Gluteals (prone): (5/5): 4+/5 (Tested in supine)  DTR  DTR WFL: yes  Dermatomes  Dermatomes WFL: yes    Outcome Measures:  Other Measures  Oswestry Disablity Index (ROMANA): 24%     Treatment Performed:  Therapeutic Exercise  Therapeutic Exercise Activity 1: HEP Education and demonstration with sets and reps as noted. Pt with good understanding and demonstration.    Manual Therapy  Manual Therapy Activity 1: Neutral Gap L3-L5 rotation, Gr. III in L sidelying  Manual Therapy Activity 2: Neutral Gap L3-L5 side-bending, Gr. III in L sidelying  Manual Therapy Activity 3: STM L lumbar paraspinals      Outpatient Education  Individual(s) Educated: Patient  Education Provided: Home Exercise Program  Patient/Caregiver Demonstrated Understanding: yes  Education Comment: Access Code: [email or web address]  URL: [email or web address]  Date: 11/27/2023  Prepared by: Chase Holland    Exercises  - Supine Lower  Trunk Rotation  - 1 x daily - 7 x weekly - 1 sets - 10 reps - 5 hold  - Supine Single Knee to Double Knee to Chest Stretch  - 1 x daily - 7 x weekly - 1 sets - 10 reps - 5 hold  - Seated Lumbar Flexion Stretch  - 1 x daily - 7 x weekly - 1 sets - 10 reps - 10 hold  - Seated Hamstring Stretch  - 1 x daily - 7 x weekly - 1 sets - 4 reps - 15 hold    Assessment:   Patient is 68 y.o. year old who presents to physical therapy with signs and symptoms consistent with low back pain. Patient has decreased ROM and strength limiting functional mobility and ADLs. Pt would benefit from skilled physical therapy in order to address the stated deficits and return to daily tasks with reduced pain and improved function. Patient with R shoulder involvement that is limiting her ability to perform certain exercises.  Right shoulder may need to be evaluated at a later date to determine need for further PT.    SINSS:  Severity: Moderate  Irritability: Moderate  Nature: MSK Lumbar spine  Stage: Sub-Acute    Rehab Prognosis: Good      Plan:  Treatment/Interventions: Electrical stimulation, Education/ Instruction, Dry needling, Neuromuscular re-education, Self care/ home management, Therapeutic activities, Therapeutic exercises, Manual therapy  PT Plan: Skilled PT  PT Frequency: 2 times per week  Duration: 6 weeks  Onset Date: 01/01/23  Rehab Potential: Good    Goals: Set and discussed today  STG (Expected End 11/27/2023)  1) Patient will improve Oswestry Low Back Disability Questionnaire by 10% in order to indicate a measurable improvement in daily function and ability to complete daily tasks.  2) Patient will be able to complete ADLs with pain less than 6/10 in lumbar region.  3) Patient will have 75% of normal lumbar ROM in order to allow for proper bathing and dressing.   4) Patient will be independent with HEP to allow for continued improvement in daily tasks at home and in the community in 3 visits.   LTG (Expected End 1/8/2024)  1)  Patient will have 4+/5 strength in lateral hip musculature to aid in stability with ambulation on varied surfaces in community.  2) Patient will be able to perform proper squatting technique and sitting postures in order to prevent increased pain with daily tasks.  3) Patient will be able to perform >30 seconds of SLS on even ground in order to allow for safe ambulation and to demonstrate reduced fall risk within the community.   4) Patient will improve Oswestry Low Back Disability Questionnaire to </=15% in order to indicate a measurable improvement in daily function and ability to complete daily tasks.    Plan of care was developed with input and agreement by the patient        Chase Holland PT

## 2023-11-28 NOTE — PROGRESS NOTES
Subjective   Patient ID: Stefanie Garnett is a 68 y.o. female who presents for Follow-up and Med Refill.    Med Refill    Patient is here for follow-up on obesity.  She also lives in her.  Lost 10 pounds.  Only side effect is constipation but taking over-the-counter stool softeners and helping       her patient had appointment with the plastic surgeon  She is going for breast reduction  She needs to lose some weight and she also needs mammogram  Mounjaro is helping to lose some weight she lost 5 pounds  She wants to increase the dose as she is tolerating it well  She is having problems with constipation for Mounjaro she was in the urgent care  Because she slid down the stairs and hurt the right shoulder was not able to move x-ray was negative     patient here for follow-up  Did blood work  Got new CPAP machine  She is using her CPAP machine regularly  Follow-up on hypertension high cholesterol  Having a lot of acid  Not able to lose weight.  Does not follow diet  Does not exercise  Fell 2 weeks ago and hurting on the right shoulder.  Range of motion of the shoulder is normal  She is gaining weight and her breast size is getting weightbearing and causing her back pain wants with reduction surgerypast recap  Did pulmonary function test  Did 2D echo  She needs clearance from cardiologist for her work     Patient is here for follow-up on blood work  She quit taking her metformin  She quit using CPAP machine because of the cough  She did pulmonary function test but results are not available  She saw the allergist and sleep doctor who recommended her to wash the tubing to avoid coughing  Patient coughs only at night during the day she is fine  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  Patient has not lost weight  She has not changed her eating habits yet        Patient is here for follow-up on obstructive sleep apnea  She is using the CPAP machine it is helping her  Follow-up on  "hypertension high cholesterol coronary artery disease   needs medication refill  Not watching diet gained weight     Needs flu shot  Overdue for GYN exam  She is seriously considering gastric bypass surgery because she is not able to lose weight she wants insurance papers filled for her hospital stay for MI     She was not using her CPAP machine  Came to the hospital with chest pain and non-ST elevation MI had repeat cardiac catheter done did not show any significant blockage  Patient was treated with antibiotic and steroid and feeling better  Her blood pressure was high and Norvasc was started She has degenerative changes in the lower back so she cannot do heavy lifting     Review of Systems    Objective   /76   Ht 1.626 m (5' 4\")   Wt 107 kg (236 lb)   BMI 40.51 kg/m²     Physical Exam  Vitals reviewed.   Constitutional:       Appearance: Normal appearance. She is obese.   HENT:      Head: Normocephalic and atraumatic.      Right Ear: Tympanic membrane, ear canal and external ear normal.      Left Ear: Tympanic membrane, ear canal and external ear normal.      Nose: Nose normal.      Mouth/Throat:      Pharynx: Oropharynx is clear.   Eyes:      Extraocular Movements: Extraocular movements intact.      Conjunctiva/sclera: Conjunctivae normal.      Pupils: Pupils are equal, round, and reactive to light.   Cardiovascular:      Rate and Rhythm: Normal rate and regular rhythm.      Pulses: Normal pulses.      Heart sounds: Normal heart sounds.   Pulmonary:      Effort: Pulmonary effort is normal.      Breath sounds: Normal breath sounds.   Abdominal:      General: Abdomen is flat. Bowel sounds are normal.      Palpations: Abdomen is soft.   Musculoskeletal:      Cervical back: Normal range of motion and neck supple.   Skin:     General: Skin is warm and dry.   Neurological:      General: No focal deficit present.      Mental Status: She is alert and oriented to person, place, and time.   Psychiatric:         " Mood and Affect: Mood normal.         Assessment/Plan   Problem List Items Addressed This Visit             ICD-10-CM       Endocrine/Metabolic    Diabetes mellitus (CMS/HCC) E11.9    Relevant Medications    tirzepatide (Mounjaro) 5 mg/0.5 mL pen injector   past recap  1. CAD. Had NSTEMI August 2015. Catheter showed no definite high-grade culprit lesion that would necessitate intervention. There was some moderate disease in the distal aspect of the first marginal branch of the LCx, but otherwise the patient had very minimal CAD. Echocardiogram done June 2017 showed an EF of 60-65%. Patient had repeat exercise nuclear stress test June 2017 which was negative for ischemia. Patient had again acute MI cardiac catheter did not show any blockage. MI was attributed to coronary spasm     2. Hypertension. Adequately controlled      3. Hyperlipidemia. . Continue atorvastatin 80 mg daily as well as Zetia 10 mg daily.     4. Obesity. Weight loss was encouraged. Refer to Dr. Skip Suarez for considering gastric bypass     5. Sleep apnea. Using CPAP     6. Carotid ultrasound. No hemodynamically significant stenosis when done June 2017.     7. Diabetes  A1c 6.4  Doing better  Continue Metformin     #8 acute sinusitis  Treat with Z-Tien     Flu shot given  Mammogram ordered  Meds refilled for 6 months          6/27/2023  Blood work reviewed  A1c 6.3 slightly up  Cholesterol under control  Patient is using CPAP with improvement in symptoms uses every night 4 to 6 hours  Patient is not able to lose weight again discussed various options  Encourage diet and exercise   10/7/2023  X-ray of the right shoulder not needed as joint has full range of motion  Treat with Flexeril and Voltaren gel topically  Refer to plastics for breast hypertrophy  Blood work reviewed  Diabetes under control at 6.2  Cholesterol under control  Blood pressure stable TSH is slightly out of range which has happened in the past  We will monitor for now  Discussed diet  and exercise to lose weight  We will try Wegovy versus Mounjaro what ever insurance covers to help her lose weight  Using CPAP  Follow-up blood work in 3 months    10/31/2023  Will increase dose of Mounjaro  Again discussed diet and exercise  Mammogram ordered  Call us 100 twice a day for constipation  She had a fall she slid down the stairs went to the urgent care and her right shoulder she hurt but now she is able to move it  Examination is normal  Follow-up in a month    11/27/2023  Patient is tolerating medication well had good response  Will increase the dose

## 2023-11-29 ENCOUNTER — TREATMENT (OUTPATIENT)
Dept: PHYSICAL THERAPY | Facility: CLINIC | Age: 68
End: 2023-11-29
Payer: COMMERCIAL

## 2023-11-29 DIAGNOSIS — G89.29 CHRONIC BILATERAL LOW BACK PAIN WITHOUT SCIATICA: Primary | ICD-10-CM

## 2023-11-29 DIAGNOSIS — M54.50 CHRONIC BILATERAL LOW BACK PAIN WITHOUT SCIATICA: Primary | ICD-10-CM

## 2023-11-29 DIAGNOSIS — M54.9 CHRONIC BACK PAIN, UNSPECIFIED BACK LOCATION, UNSPECIFIED BACK PAIN LATERALITY: ICD-10-CM

## 2023-11-29 DIAGNOSIS — G89.29 CHRONIC BACK PAIN, UNSPECIFIED BACK LOCATION, UNSPECIFIED BACK PAIN LATERALITY: ICD-10-CM

## 2023-11-29 DIAGNOSIS — N62 BREAST HYPERTROPHY: ICD-10-CM

## 2023-11-29 PROCEDURE — 97140 MANUAL THERAPY 1/> REGIONS: CPT | Mod: GP,CQ

## 2023-11-29 PROCEDURE — 97110 THERAPEUTIC EXERCISES: CPT | Mod: GP,CQ

## 2023-11-29 ASSESSMENT — PAIN SCALES - GENERAL: PAINLEVEL_OUTOF10: 7

## 2023-11-29 ASSESSMENT — PAIN - FUNCTIONAL ASSESSMENT: PAIN_FUNCTIONAL_ASSESSMENT: 0-10

## 2023-11-29 NOTE — PROGRESS NOTES
"  Physical Therapy Treatment    Patient Name: Stefanie Garnett  MRN: [id number]  Today's Date: 11/29/2023  Time Calculation  Start Time: 1000  Stop Time: 1045  Time Calculation (min): 45 min    Current Problem  1. Chronic bilateral low back pain without sciatica  Follow Up In Physical Therapy      2. Breast hypertrophy        3. Chronic back pain, unspecified back location, unspecified back pain laterality            Insurance:  Number of Treatments Authorized: 2 of            Subjective   General  Reason for Referral: Low Back Pain  Referred By: Dr. Lizz Lew  Past Medical History Relevant to Rehab: HTN  General Comment: Patient states her arms mostly hurt today. Her back is sore as well.    Performing HEP?: Yes    Precautions  Precautions  STEADI Fall Risk Score (The score of 4 or more indicates an increased risk of falling): 4  Precautions Comment: Moderate Fall Risk  Pain  Pain Assessment: 0-10  Pain Score: 7    Objective       Treatments:    Therapeutic Exercise  Therapeutic Exercise Activity 1: LTR x 2 min  Therapeutic Exercise Activity 2: R/L SKTC with towel assist x 10 ea  Therapeutic Exercise Activity 3: HL ADD ball squeeze 3\" hold x 1 min  Therapeutic Exercise Activity 4: HL GTB hip ABD 3\" hold x 1 min  Therapeutic Exercise Activity 5: HL iso glut squeeze 3\" x 1 min  Therapeutic Exercise Activity 6: alt SAQ 0# x 2 min  Therapeutic Exercise Activity 7: R/L SLR 2 x 10 ea  Therapeutic Exercise Activity 8: RFIS x 10  Therapeutic Exercise Activity 9: seated TVA 3\" x 10  Therapeutic Exercise Activity 10: TVA with sit to stand transfer x 5         Manual Therapy  Manual Therapy Activity 1: Green roller R/L quads,gluts, LS para  Manual Therapy Activity 2: SI mobs                     OP EDUCATION:  Outpatient Education  Education Comment: 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    Assessment:  PT Assessment  Assessment Comment: Patient notes her R LE fatigues more quickly " than her L with ther ex.  Iggyue on core engagement with bed mobility and transfers.    Plan:  OP PT Plan  Treatment/Interventions: Electrical stimulation, Education/ Instruction, Dry needling, Neuromuscular re-education, Self care/ home management, Therapeutic activities, Therapeutic exercises, Manual therapy  PT Plan: Skilled PT  PT Frequency: 2 times per week  Duration: 6 weeks  Onset Date: 01/01/23  Number of Treatments Authorized: 2 of  Rehab Potential: Good  Plan of Care Agreement: Patient    Goals:  Active       PT Problem       STG       Start:  11/27/23    Expected End:  12/18/23       1) Patient will improve Oswestry Low Back Disability Questionnaire by 10% in order to indicate a measurable improvement in daily function and ability to complete daily tasks.  2) Patient will be able to complete ADLs with pain less than 6/10 in lumbar region.  3) Patient will have 75% of normal lumbar ROM in order to allow for proper bathing and dressing.   4) Patient will be independent with HEP to allow for continued improvement in daily tasks at home and in the community in 3 visits.            LTG       Start:  11/27/23    Expected End:  01/08/24       1) Patient will have 4+/5 strength in lateral hip musculature to aid in stability with ambulation on varied surfaces in community.  2) Patient will be able to perform proper squatting technique and sitting postures in order to prevent increased pain with daily tasks.  3) Patient will be able to perform >30 seconds of SLS on even ground in order to allow for safe ambulation and to demonstrate reduced fall risk within the community.   4) Patient will improve Oswestry Low Back Disability Questionnaire to </=15% in order to indicate a measurable improvement in daily function and ability to complete daily tasks.                Suzie Keys, PTA

## 2023-11-30 ENCOUNTER — ANCILLARY PROCEDURE (OUTPATIENT)
Dept: RADIOLOGY | Facility: CLINIC | Age: 68
End: 2023-11-30
Payer: COMMERCIAL

## 2023-11-30 VITALS — BODY MASS INDEX: 37.56 KG/M2 | WEIGHT: 220 LBS | HEIGHT: 64 IN

## 2023-11-30 DIAGNOSIS — Z12.31 SCREENING MAMMOGRAM, ENCOUNTER FOR: ICD-10-CM

## 2023-11-30 PROCEDURE — 77063 BREAST TOMOSYNTHESIS BI: CPT

## 2023-12-05 ENCOUNTER — TREATMENT (OUTPATIENT)
Dept: PHYSICAL THERAPY | Facility: CLINIC | Age: 68
End: 2023-12-05
Payer: COMMERCIAL

## 2023-12-05 DIAGNOSIS — M54.50 CHRONIC BILATERAL LOW BACK PAIN WITHOUT SCIATICA: ICD-10-CM

## 2023-12-05 DIAGNOSIS — G89.29 CHRONIC BILATERAL LOW BACK PAIN WITHOUT SCIATICA: ICD-10-CM

## 2023-12-05 PROCEDURE — 97110 THERAPEUTIC EXERCISES: CPT | Mod: GP,CQ

## 2023-12-05 PROCEDURE — 97140 MANUAL THERAPY 1/> REGIONS: CPT | Mod: GP,CQ

## 2023-12-05 ASSESSMENT — PAIN SCALES - GENERAL: PAINLEVEL_OUTOF10: 4

## 2023-12-05 ASSESSMENT — PAIN - FUNCTIONAL ASSESSMENT: PAIN_FUNCTIONAL_ASSESSMENT: 0-10

## 2023-12-05 NOTE — PROGRESS NOTES
"  Physical Therapy Treatment    Patient Name: Stefanie Garnett  MRN: [id number]  Today's Date: 12/5/2023  Time Calculation  Start Time: 0949  Stop Time: 1032  Time Calculation (min): 43 min  Current Problem  1. Chronic bilateral low back pain without sciatica  Follow Up In Physical Therapy          Insurance:  Payor: GENERIC COMMERCIAL / Plan: GENERIC COMMERCIAL / Product Type: *No Product type* /   Number of Treatments Authorized: 3/12          Subjective   General  Reason for Referral: Low Back Pain  Referred By: Dr. Lizz Lew  Past Medical History Relevant to Rehab: HTN (REVIEWED MEDICAL HISTORY)  General Comment: PT STATES SHE HASN'T PEFORMED HEP FOR A FEW DAYS BECAUSE SHE HAS BEEN BUSY WITH [email or web address].    Performing HEP?: No    Precautions  Precautions  Precautions Comment: Moderate Fall Risk  Pain  Pain Assessment: 0-10  Pain Score: 4    Objective   General Observation  General Observation: FWD POSTURE       Treatments:    Therapeutic Exercise  Therapeutic Exercise Activity 1: SCIFIT MAN L2 X 5 MIN  Therapeutic Exercise Activity 2: GASTROC STRETCH X 1 MIN  Therapeutic Exercise Activity 3: MINI SQUATS X 1 MIN  Therapeutic Exercise Activity 4: SBALL LB ROT X 2 MIN  Therapeutic Exercise Activity 5: SBALL TVA ROLL IN - OUT X 2 MIN  Therapeutic Exercise Activity 6: alt SAQ 2# x 2 min  Therapeutic Exercise Activity 8: RFIS WITH SBALL X 2 MIN  Therapeutic Exercise Activity 9: HL HIP ADD HOLD 10\" X 2 MIN  Therapeutic Exercise Activity 10: HL HIP ABD R/L ALT GREEN TBAND X 2 MIN  Therapeutic Exercise Activity 11: BRIDGE X 2 MIN         Manual Therapy  Manual Therapy Activity 1: MFR BOTH LEG PULL  Manual Therapy Activity 2: STRETCH HAM, GLUT, QUAD, HIP FLEX  Manual Therapy Activity 3: PROM HIP FLEX, ER,IR                   OP EDUCATION:  Outpatient Education  Education Comment: CONTINUE WITH CURRENT HEP    Assessment:  PT Assessment  Assessment Comment: PT NITHIN EX'S FAIRLY WELL.  SHE IS CHALLENGED AND FATIGUES " QUICKLY WITH CURRENT THER EX.  HER R LE FATIGUES QUICKER.  PT IS VERY TIGHT IN HER HIPS AND LE.  PT WAS SORE AFTER TODAY'S SESSION.    Plan:  OP PT Plan  Treatment/Interventions: Electrical stimulation, Education/ Instruction, Dry needling, Neuromuscular re-education, Self care/ home management, Therapeutic activities, Therapeutic exercises, Manual therapy  PT Plan: Skilled PT  PT Frequency: 2 times per week  Duration: 6 weeks  Onset Date: 01/01/23  Number of Treatments Authorized: 3/12    Goals:  Active       PT Problem       STG       Start:  11/27/23    Expected End:  12/18/23       1) Patient will improve Oswestry Low Back Disability Questionnaire by 10% in order to indicate a measurable improvement in daily function and ability to complete daily tasks.  2) Patient will be able to complete ADLs with pain less than 6/10 in lumbar region.  3) Patient will have 75% of normal lumbar ROM in order to allow for proper bathing and dressing.   4) Patient will be independent with HEP to allow for continued improvement in daily tasks at home and in the community in 3 visits.            LTG       Start:  11/27/23    Expected End:  01/08/24       1) Patient will have 4+/5 strength in lateral hip musculature to aid in stability with ambulation on varied surfaces in community.  2) Patient will be able to perform proper squatting technique and sitting postures in order to prevent increased pain with daily tasks.  3) Patient will be able to perform >30 seconds of SLS on even ground in order to allow for safe ambulation and to demonstrate reduced fall risk within the community.   4) Patient will improve Oswestry Low Back Disability Questionnaire to </=15% in order to indicate a measurable improvement in daily function and ability to complete daily tasks.                Garry Shah, PTA

## 2023-12-07 ENCOUNTER — TREATMENT (OUTPATIENT)
Dept: PHYSICAL THERAPY | Facility: CLINIC | Age: 68
End: 2023-12-07
Payer: COMMERCIAL

## 2023-12-07 DIAGNOSIS — M54.50 CHRONIC BILATERAL LOW BACK PAIN WITHOUT SCIATICA: Primary | ICD-10-CM

## 2023-12-07 DIAGNOSIS — G89.29 CHRONIC BILATERAL LOW BACK PAIN WITHOUT SCIATICA: Primary | ICD-10-CM

## 2023-12-07 PROCEDURE — 97110 THERAPEUTIC EXERCISES: CPT | Mod: GP,CQ

## 2023-12-07 PROCEDURE — 97140 MANUAL THERAPY 1/> REGIONS: CPT | Mod: GP,CQ

## 2023-12-07 ASSESSMENT — PAIN - FUNCTIONAL ASSESSMENT: PAIN_FUNCTIONAL_ASSESSMENT: 0-10

## 2023-12-07 ASSESSMENT — PAIN SCALES - GENERAL: PAINLEVEL_OUTOF10: 4

## 2023-12-07 NOTE — PROGRESS NOTES
"  Physical Therapy Treatment    Patient Name: Stefanie Garnett  MRN: [id number]  Today's Date: 12/7/2023  Time Calculation  Start Time: 0930  Stop Time: 1015  Time Calculation (min): 45 min    Current Problem  1. Chronic bilateral low back pain without sciatica  Follow Up In Physical Therapy          Insurance:  Number of Treatments Authorized: 4/12            Subjective   General  Reason for Referral: Low Back Pain  Referred By: Dr. Lizz Lew  Past Medical History Relevant to Rehab: HTN (REVIEWED MEDICAL HISTORY)  General Comment: PAtient c/o B hip and shoulder pain today.  She has not been able to perform her HEP due to time constraints.  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    Performing HEP?: No    Precautions  Precautions  Precautions Comment: Moderate Fall Risk  Pain  Pain Assessment: 0-10  Pain Score: 4    Objective       Treatments:    Therapeutic Exercise  Therapeutic Exercise Activity 1: SCIFIT MAN L3 X 5 MIN  Therapeutic Exercise Activity 2: GASTROC STRETCH X 1 MIN  Therapeutic Exercise Activity 3: standing heel raises and toe raises x 10 ea  Therapeutic Exercise Activity 4: seated R/L HS stretch 2 x 30\" ea  Therapeutic Exercise Activity 5: 2#/2# alt LAQ with pause x 2 min  Therapeutic Exercise Activity 6: 2#/2# seated march x 1 mi  Therapeutic Exercise Activity 7: standing R/L hip ABD 2 x 10 ea  Therapeutic Exercise Activity 8: standing R/L hip EXT 2 x 10 ea  Therapeutic Exercise Activity 9: R/L hip IR/ER with knee on stool x 10 ea  Therapeutic Exercise Activity 10: LTR x 2 min  Therapeutic Exercise Activity 11: bridge x 2 min         Manual Therapy  Manual Therapy Activity 1: stretch R/L glut, piriformis, ITB, HF/quad  Manual Therapy Activity 2: PROM R/L hip  Manual Therapy Activity 3: MFR R/L leg pull                     OP EDUCATION:       Assessment:  PT Assessment  Assessment Comment: Patient fatigues easily with ther ex R>L LE. Tight and tender in her hips. Her R shoulder is more " bothersome at this time.    Plan:  OP PT Plan  Treatment/Interventions: Electrical stimulation, Education/ Instruction, Dry needling, Neuromuscular re-education, Self care/ home management, Therapeutic activities, Therapeutic exercises, Manual therapy  PT Plan: Skilled PT  PT Frequency: 2 times per week  Duration: 6 weeks  Onset Date: 01/01/23  Number of Treatments Authorized: 4/12    Goals:  Active       PT Problem       STG       Start:  11/27/23    Expected End:  12/18/23       1) Patient will improve Oswestry Low Back Disability Questionnaire by 10% in order to indicate a measurable improvement in daily function and ability to complete daily tasks.  2) Patient will be able to complete ADLs with pain less than 6/10 in lumbar region.  3) Patient will have 75% of normal lumbar ROM in order to allow for proper bathing and dressing.   4) Patient will be independent with HEP to allow for continued improvement in daily tasks at home and in the community in 3 visits.            LTG       Start:  11/27/23    Expected End:  01/08/24       1) Patient will have 4+/5 strength in lateral hip musculature to aid in stability with ambulation on varied surfaces in community.  2) Patient will be able to perform proper squatting technique and sitting postures in order to prevent increased pain with daily tasks.  3) Patient will be able to perform >30 seconds of SLS on even ground in order to allow for safe ambulation and to demonstrate reduced fall risk within the community.   4) Patient will improve Oswestry Low Back Disability Questionnaire to </=15% in order to indicate a measurable improvement in daily function and ability to complete daily tasks.                Suzie Keys, PTA

## 2023-12-12 ENCOUNTER — TREATMENT (OUTPATIENT)
Dept: PHYSICAL THERAPY | Facility: CLINIC | Age: 68
End: 2023-12-12
Payer: COMMERCIAL

## 2023-12-12 DIAGNOSIS — G89.29 CHRONIC BILATERAL LOW BACK PAIN WITHOUT SCIATICA: ICD-10-CM

## 2023-12-12 DIAGNOSIS — M54.50 CHRONIC BILATERAL LOW BACK PAIN WITHOUT SCIATICA: ICD-10-CM

## 2023-12-12 PROCEDURE — 97140 MANUAL THERAPY 1/> REGIONS: CPT | Mod: GP,CQ

## 2023-12-12 PROCEDURE — 97110 THERAPEUTIC EXERCISES: CPT | Mod: GP,CQ

## 2023-12-12 ASSESSMENT — PAIN SCALES - GENERAL: PAINLEVEL_OUTOF10: 1

## 2023-12-12 ASSESSMENT — PAIN - FUNCTIONAL ASSESSMENT: PAIN_FUNCTIONAL_ASSESSMENT: 0-10

## 2023-12-12 NOTE — PROGRESS NOTES
"  Physical Therapy Treatment    Patient Name: Stefanie Garnett  MRN: [id number]  Today's Date: 12/12/2023  Time Calculation  Start Time: 0947  Stop Time: 1030  Time Calculation (min): 43 min  Current Problem  1. Chronic bilateral low back pain without sciatica  Follow Up In Physical Therapy          Insurance:  Payor: GENERIC COMMERCIAL / Plan: GENERIC COMMERCIAL / Product Type: *No Product type* /   Number of Treatments Authorized: 5/12          Subjective   General  Reason for Referral: Low Back Pain  Referred By: Dr. Lizz Lew  Past Medical History Relevant to Rehab: HTN (REVIEWED MEDICAL HISTORY)  General Comment: PT STATES SHE WAS HURTING A LOT LAST NIGHT.  SHE HAD TO TAKE PAIN MEDS.  SHE ISN'T DOING TOO BAD TODAY. HER LB AND R SH HURTS A LITTLE THIS MORNING.    Performing HEP?: Yes    Precautions  Precautions  Precautions Comment: Moderate Fall Risk  Pain  Pain Assessment: 0-10  Pain Score: 1    Objective   General Observation  General Observation: FWD POSTURE         Treatments:    Therapeutic Exercise  Therapeutic Exercise Activity 1: SCIFIT [place] L1 X 6 MIN  Therapeutic Exercise Activity 2: GASTROC STRETCH X 1 MIN  Therapeutic Exercise Activity 3: standing heel raises and toe raises x 10 ea  Therapeutic Exercise Activity 4: seated R/L HS stretch 2 x 30\" ea  Therapeutic Exercise Activity 5: MINI SQUATS X 1 MIN  Therapeutic Exercise Activity 6: SBALL LB ROT X 2 MIN  Therapeutic Exercise Activity 7: SBALL TVA ROLL IN - OUT X 2 MIN  Therapeutic Exercise Activity 8: alt SAQ 2# x 2 min  Therapeutic Exercise Activity 9: HL HIP ADD HOLD 10\" X 2 MIN  Therapeutic Exercise Activity 10: HL HIP ABD R/L ALT GREEN TBAND X 2 MIN  Therapeutic Exercise Activity 11: BRIDGE X 2 MIN         Manual Therapy  Manual Therapy Activity 1: MFR BOTH LEG PULL  Manual Therapy Activity 2: STRETCH HAM, GLUT, QUAD, HIP FLEX  Manual Therapy Activity 3: PROM HIP FLEX, ER,IR                   OP EDUCATION:  Outpatient Education  Education " Comment: CONTINUE WITH CURRENT HEP    Assessment:  PT Assessment  Assessment Comment: PT NITHIN EX'S WELL.  SHE CONTINUES TO BE CHALLENGED WITH CURRENT THER EX AND FATIGUES QUICKLY.  SHE IF VERY TIGHT IN HER LE AND HIPS.    Plan:  OP PT Plan  Treatment/Interventions: Electrical stimulation, Education/ Instruction, Dry needling, Neuromuscular re-education, Self care/ home management, Therapeutic activities, Therapeutic exercises, Manual therapy  PT Plan: Skilled PT  PT Frequency: 2 times per week  Duration: 6 weeks  Onset Date: 01/01/23  Number of Treatments Authorized: 5/12    Goals:  Active       PT Problem       STG       Start:  11/27/23    Expected End:  12/18/23       1) Patient will improve Oswestry Low Back Disability Questionnaire by 10% in order to indicate a measurable improvement in daily function and ability to complete daily tasks.  2) Patient will be able to complete ADLs with pain less than 6/10 in lumbar region.  3) Patient will have 75% of normal lumbar ROM in order to allow for proper bathing and dressing.   4) Patient will be independent with HEP to allow for continued improvement in daily tasks at home and in the community in 3 visits.            LTG       Start:  11/27/23    Expected End:  01/08/24       1) Patient will have 4+/5 strength in lateral hip musculature to aid in stability with ambulation on varied surfaces in community.  2) Patient will be able to perform proper squatting technique and sitting postures in order to prevent increased pain with daily tasks.  3) Patient will be able to perform >30 seconds of SLS on even ground in order to allow for safe ambulation and to demonstrate reduced fall risk within the community.   4) Patient will improve Oswestry Low Back Disability Questionnaire to </=15% in order to indicate a measurable improvement in daily function and ability to complete daily tasks.                Garry Shah, PTA

## 2023-12-14 ENCOUNTER — TREATMENT (OUTPATIENT)
Dept: PHYSICAL THERAPY | Facility: CLINIC | Age: 68
End: 2023-12-14
Payer: COMMERCIAL

## 2023-12-14 DIAGNOSIS — M54.50 CHRONIC BILATERAL LOW BACK PAIN WITHOUT SCIATICA: ICD-10-CM

## 2023-12-14 DIAGNOSIS — G89.29 CHRONIC BILATERAL LOW BACK PAIN WITHOUT SCIATICA: ICD-10-CM

## 2023-12-14 PROCEDURE — 97140 MANUAL THERAPY 1/> REGIONS: CPT | Mod: GP,CQ

## 2023-12-14 PROCEDURE — 97110 THERAPEUTIC EXERCISES: CPT | Mod: GP,CQ

## 2023-12-14 ASSESSMENT — PAIN - FUNCTIONAL ASSESSMENT: PAIN_FUNCTIONAL_ASSESSMENT: 0-10

## 2023-12-14 ASSESSMENT — PAIN SCALES - GENERAL: PAINLEVEL_OUTOF10: 1

## 2023-12-14 NOTE — PROGRESS NOTES
"  Physical Therapy Treatment    Patient Name: Stefanie Garnett  MRN: [id number]  Today's Date: 12/14/2023  Time Calculation  Start Time: 1650  Stop Time: 1734  Time Calculation (min): 44 min  Current Problem  1. Chronic bilateral low back pain without sciatica  Follow Up In Physical Therapy          Insurance:  Payor: GENERIC COMMERCIAL / Plan: GENERIC COMMERCIAL / Product Type: *No Product type* /   Number of Treatments Authorized: 6/12          Subjective   General  Reason for Referral: Low Back Pain  Referred By: Dr. Lizz Lew  Past Medical History Relevant to Rehab: HTN (REVIEWED MEDICAL HISTORY)  General Comment: PT STATES HER LB AND R SHOULDER WERE HURTING A LOT THIS MORNING.  SHE TOOK PAIN MEDS AND IS DOING  BETTER CURRENTLY.    Performing HEP?: Yes    Precautions  Precautions  Precautions Comment: Moderate Fall Risk  Pain  Pain Assessment: 0-10  Pain Score: 1    Objective   General Observation  General Observation: NOTED R LE SHORTER TODAY WITH HIP ROTATION       Treatments:    Therapeutic Exercise  Therapeutic Exercise Activity 1: SC[place]S L1 X 5 MIN  Therapeutic Exercise Activity 2: GASTROC STRETCH X 1 MIN  Therapeutic Exercise Activity 3: INCLINE HEEL RAISES X 1 MIN  Therapeutic Exercise Activity 4: seated R/L HS stretch 2 x 30\" ea  Therapeutic Exercise Activity 5: RFIS WITH SBALL X 2 MIN  Therapeutic Exercise Activity 6: SBALL LB ROT X 2 MIN  Therapeutic Exercise Activity 7: SBALL TVA ROLL IN - OUT PT STARTED GETTING R HIP / GROIN PAIN  Therapeutic Exercise Activity 8: RFIS X 10 NO CHANGE IN R HIP PAIN  Therapeutic Exercise Activity 9: GIULIA X 2 MIN R HIP / GROIN FELT A LITTLE BETTER  Therapeutic Exercise Activity 10: GIULIA X 2 MIN         Manual Therapy  Manual Therapy Activity 1: STRETCH QUAD, HIP FLEX PRONE  Manual Therapy Activity 2: GREEN ROLLER STM L/S, GLUT, PIRIF  Manual Therapy Activity 3: MFR R LEG PULL  Manual Therapy Activity 4: METS R OVER L                   OP EDUCATION:  Outpatient " Education  Education Comment: Access Code: [email or web address]  URL: [email or web address]  Date: 12/14/2023  Prepared by: Mukul Shah    Exercises  - Prone on Elbows Stretch  - 4-5 x daily - 7 x weekly - 1 sets - 2 min hold    Assessment:  PT Assessment  Assessment Comment: PT STARTED GETTING R HIP / GROIN PAIN AFTER PERFORMING SBALL TVA ROLL IN - OUT.  SHE RESPONDED WELL WITH GIULIA.  PT HIPS WERE ALSO ROTATED AND CORRECTED WITH MUSCLE ENERGY TECHNIQE.    Plan:  OP PT Plan  Treatment/Interventions: Electrical stimulation, Education/ Instruction, Dry needling, Neuromuscular re-education, Self care/ home management, Therapeutic activities, Therapeutic exercises, Manual therapy  PT Plan: Skilled PT (CHECK HIP LEVEL / ROT NEXT VISIT)  PT Frequency: 2 times per week  Duration: 6 weeks  Onset Date: 01/01/23  Number of Treatments Authorized: 6/12    Goals:  Active       PT Problem       STG       Start:  11/27/23    Expected End:  12/18/23       1) Patient will improve Oswestry Low Back Disability Questionnaire by 10% in order to indicate a measurable improvement in daily function and ability to complete daily tasks.  2) Patient will be able to complete ADLs with pain less than 6/10 in lumbar region.  3) Patient will have 75% of normal lumbar ROM in order to allow for proper bathing and dressing.   4) Patient will be independent with HEP to allow for continued improvement in daily tasks at home and in the community in 3 visits.            LTG       Start:  11/27/23    Expected End:  01/08/24       1) Patient will have 4+/5 strength in lateral hip musculature to aid in stability with ambulation on varied surfaces in community.  2) Patient will be able to perform proper squatting technique and sitting postures in order to prevent increased pain with daily tasks.  3) Patient will be able to perform >30 seconds of SLS on even ground in order to allow for safe ambulation and to demonstrate reduced fall risk within  the community.   4) Patient will improve Oswestry Low Back Disability Questionnaire to </=15% in order to indicate a measurable improvement in daily function and ability to complete daily tasks.                Garry Shah, PTA

## 2023-12-19 ENCOUNTER — TREATMENT (OUTPATIENT)
Dept: PHYSICAL THERAPY | Facility: CLINIC | Age: 68
End: 2023-12-19
Payer: COMMERCIAL

## 2023-12-19 DIAGNOSIS — M54.50 CHRONIC BILATERAL LOW BACK PAIN WITHOUT SCIATICA: ICD-10-CM

## 2023-12-19 DIAGNOSIS — G89.29 CHRONIC BILATERAL LOW BACK PAIN WITHOUT SCIATICA: ICD-10-CM

## 2023-12-19 PROCEDURE — 97140 MANUAL THERAPY 1/> REGIONS: CPT | Mod: GP | Performed by: PHYSICAL THERAPIST

## 2023-12-19 PROCEDURE — 97110 THERAPEUTIC EXERCISES: CPT | Mod: GP | Performed by: PHYSICAL THERAPIST

## 2023-12-19 ASSESSMENT — PAIN - FUNCTIONAL ASSESSMENT: PAIN_FUNCTIONAL_ASSESSMENT: 0-10

## 2023-12-19 ASSESSMENT — PAIN SCALES - GENERAL: PAINLEVEL_OUTOF10: 2

## 2023-12-20 NOTE — PROGRESS NOTES
"  Physical Therapy Treatment    Patient Name: Stefanie Garnett  MRN: [id number]  Today's Date: 12/19/2023  Time Calculation  Start Time: 1730  Stop Time: 1815  Time Calculation (min): 45 min    Current Problem  1. Chronic bilateral low back pain without sciatica  Follow Up In Physical Therapy          Insurance:  Number of Treatments Authorized: 7/12        Payor: GENERIC COMMERCIAL / Plan: GENERIC COMMERCIAL / Product Type: *No Product type* /     Subjective   General  Reason for Referral: Low Back Pain  Referred By: Dr. Lizz Lew  Past Medical History Relevant to Rehab: HTN (REVIEWED MEDICAL HISTORY)  General Comment: Patient reports that \"everything is hurting\".    Performing HEP?: Yes    Precautions  Precautions  Precautions Comment: Moderate Fall Risk  Pain  Pain Assessment: 0-10  Pain Score: 2  Pain Location: Back (#1 (I,V): Lower lumbar spine, 0-9/10, \"sharp/aching\"; #2 (I): R anterior thigh, \"numbness\")    Objective   Lumbar Spine    Observation  Lumbar: Increased lumbar lordosis with anterior pelvic tilt  Observation:: Increased difficulty with bed mobility due to right shoulder pain.  Lumbar Palpation/Joint Mobility Assessment  Palpation/Joint Mobility Comment: Tenderness R>L lower lumbar spine  Lumbar AROM  Lumbar flexion: (60°): 50° (Gowers return to stand)  Lumbar extension (25°): 15° (#1)  Lumbar sidebend right (25°): 15°  Lumbar sidebend left (25°): 15°  Hip AROM     Lumbar Myotomes  Lumbar Myotomes WFL: yes  Specific Lower Extremity MMT  R Iliopsoas: (5/5): 4/5  L Iliopsoas: (5/5): 4/5  R Gluteals (prone): (5/5): 4+/5 (Tested in supine)  L Gluteals (prone): (5/5): 4+/5 (Tested in supine)  DTR  DTR WFL: yes  Dermatomes  Dermatomes WFL: yes    Treatments:    Therapeutic Exercise  Therapeutic Exercise Activity 1: SciFit, L2, 6 mins  Therapeutic Exercise Activity 2: Gastroc Stretch, incline, 3x30 secs  Therapeutic Exercise Activity 3: DKTC with SB, 2 mins  Therapeutic Exercise Activity 4: Seated R/L " "HS stretch 2 x 30\" ea  Therapeutic Exercise Activity 5: TrA Contraction in supine,  Therapeutic Exercise Activity 6: LTR with SB 2 mins  Therapeutic Exercise Activity 7: ANDREIA at the edge of the table, 2x10  Therapeutic Exercise Activity 8: Education on self-management of symptoms      Manual Therapy  Manual Therapy Activity 1: STM R anterior thigh/groin  Manual Therapy Activity 2: Long Axis Distraction, Gr. III in supine      Assessment:  PT Assessment  Assessment Comment: Patient with increased pain throughout session today and lumbar spine and right shoulder limiting tolerance to exercises and change of position.  Patient is having minimal response to treatment of the lumbar spine and her primary focus is on right shoulder pain.  May shift treatment to this area next visit depending on patient decision to assist with improving tolerance to functional tasks for ADLs.    Plan:     PT Plan: Skilled PT (CHECK HIP LEVEL / ROT NEXT VISIT)        Onset Date: 01/01/23      Chase Holland, PT    " 19-Aug-2019 09:59

## 2023-12-21 ENCOUNTER — TREATMENT (OUTPATIENT)
Dept: PHYSICAL THERAPY | Facility: CLINIC | Age: 68
End: 2023-12-21
Payer: COMMERCIAL

## 2023-12-21 DIAGNOSIS — M79.601 PAIN OF RIGHT UPPER EXTREMITY: Primary | ICD-10-CM

## 2023-12-21 DIAGNOSIS — G89.29 CHRONIC BILATERAL LOW BACK PAIN WITHOUT SCIATICA: ICD-10-CM

## 2023-12-21 DIAGNOSIS — M54.50 CHRONIC BILATERAL LOW BACK PAIN WITHOUT SCIATICA: ICD-10-CM

## 2023-12-21 PROCEDURE — 97110 THERAPEUTIC EXERCISES: CPT | Mod: GP | Performed by: PHYSICAL THERAPIST

## 2023-12-21 PROCEDURE — 97164 PT RE-EVAL EST PLAN CARE: CPT | Mod: GP | Performed by: PHYSICAL THERAPIST

## 2023-12-21 PROCEDURE — 97140 MANUAL THERAPY 1/> REGIONS: CPT | Mod: GP | Performed by: PHYSICAL THERAPIST

## 2023-12-21 ASSESSMENT — PAIN SCALES - GENERAL
PAINLEVEL_OUTOF10: 2
PAINLEVEL_OUTOF10: 10 - WORST POSSIBLE PAIN

## 2023-12-21 ASSESSMENT — PAIN - FUNCTIONAL ASSESSMENT: PAIN_FUNCTIONAL_ASSESSMENT: 0-10

## 2023-12-21 NOTE — PROGRESS NOTES
"  Physical Therapy Treatment/Progress Note    Patient Name: Stefanie Garnett  MRN: [id number]  Today's Date: 12/21/2023  Time Calculation  Start Time: 1730  Stop Time: 1815  Time Calculation (min): 45 min    Current Problem  1. Pain of right upper extremity        2. Chronic bilateral low back pain without sciatica  Follow Up In Physical Therapy          Insurance:  Number of Treatments Authorized: 8/12        Payor: GENERIC COMMERCIAL / Plan: GENERIC COMMERCIAL / Product Type: *No Product type* /     Subjective   General  Reason for Referral: Low Back Pain  Referred By: Dr. Lizz Lew  Past Medical History Relevant to Rehab: HTN (REVIEWED MEDICAL HISTORY)  General Comment: Patient reports that she is feeling the same as she has been.  She notes increased pain in the right shoulder and upper extremity that is significantly impacting her tolerance for functional tasks during work and ADLs and this is impacting overall quality of life.  She notes that this pain continues to radiate from the shoulder into the lateral upper arm.  She also notes pain that travels from the neck down the right upper extremity into the right thumb and index finger.  She is unable to determine if the symptoms are related but can note both of them when raising or moving the arm    Performing HEP?: Yes    Precautions  Precautions  Precautions Comment: Moderate Fall Risk  Pain  Pain Assessment: 0-10  Pain Score: 2 (Current #1)  Pain Location: Back (#1 (I,V): Lower lumbar spine, 0-9/10, \"sharp/aching\"; #2 (I): R anterior thigh, \"numbness\")  Multiple Pain Sites: Two  Pain Score: 10- Worst Possible pain (current #3)  Shoulder (#3 (C,V): R anterolateral shoulder/lateral upper arm, 0-10/10, \"intense radiating pain/catch\" )    Objective   Lumbar Spine    Observation  Lumbar: Increased lumbar lordosis with anterior pelvic tilt  Observation:: Increased difficulty with bed mobility due to right shoulder pain.  Lumbar Palpation/Joint Mobility " Assessment  Palpation/Joint Mobility Comment: Tenderness R>L lower lumbar spine  Lumbar AROM  Lumbar flexion: (60°): 50° (Gowers return to stand)  Lumbar extension (25°): 15° (#1)  Lumbar sidebend right (25°): 15°  Lumbar sidebend left (25°): 15°  Hip AROM     Lumbar Myotomes  Lumbar Myotomes WFL: yes  Specific Lower Extremity MMT  R Iliopsoas: (5/5): 4/5  L Iliopsoas: (5/5): 4/5  R Gluteals (prone): (5/5): 4+/5 (Tested in supine)  L Gluteals (prone): (5/5): 4+/5 (Tested in supine)  DTR  DTR WFL: yes  Dermatomes  Dermatomes WFL: yes       and Shoulder    Observation  Shoulder Observation Comment: Guarding posturing involving R UE and pain with movement in all planes  Shoulder palpation/Joint Assessment  Shoulder Palpation/Joint Mobility Comment: Tenderness R UT/deltoid  Cervical AROM  Cervical flexion: (80°): 50°  Cervical extension: (50°): 50°  Cervical Rotation: (80°): 65°  Cervical rotation left: (80°): 65°  Cervical sidebend right: (45°): 20°  Cervical sidebend left: (45°): 20°  Shoulder AROM WNL unless noted below  R Shoulder flexion: (180°): 60° (#3)  L Shoulder flexion: (180°): 165°  R shoulder abduction: (180°): 30° (#3)  L Shoulder abduction: (180°): 150°  Shoulder PROM WNL unless noted below  R shoulder flexion: (180°): 100° (#3 at end-range)  L shoulder abduction: (180°): 75° (#3 at end-range)  R shoulder ER: (90°): 40° (#3 at end-range)  R shoulder IR: (70°): 25° (#3 at end-range)    Shoulder Strength 5/5 unless noted below  R shoulder flexion: (5/5): 2+/5 (Pain evaluation)  R shoulder abduction: (5/5): 2+/5 (pain inhibition)  R shoulder ER: (5/5): 2+/5 (Pain inhibition)  R shoulder IR: (5/5) : 3+/5 (pain inhibition)    Shoulder Special  Painful arc: Negative: Positive  Infraspinatus MMT: Negative: Positive  Drop Arm Test: Negative: Positive  UE Special Tests Comments: Spurlings/Distraction (-)    Outcome Measures:  Other Measures  Disability of Arm Shoulder Hand (DASH): 61.26  (QuickDASH)    Treatments:    Therapeutic Exercise  Therapeutic Exercise Activity 1: PROM R shoulder all planes  Therapeutic Exercise Activity 2: TrA Contraction in supine,  Therapeutic Exercise Activity 3: LTR in supine, 1x20  Therapeutic Exercise Activity 4: Pendulums R shoulder, 1x10 each direction  Therapeutic Exercise Activity 5: Scapular Retractions, 5 secs x 20         Manual Therapy  Manual Therapy Activity 1: Joint mobilizations: R shoulder inf/post, Gr. I/II for pain  Manual Therapy Activity 2: STM R UT/lateral shoulder    OP EDUCATION:  Outpatient Education  Individual(s) Educated: Patient  Education Provided: Home Exercise Program  Patient/Caregiver Demonstrated Understanding: yes  Education Comment: Access Code: [email or web address]  URL: [email or web address]  Date: 12/21/2023  Prepared by: Chase Holland    Exercises  - Circular Shoulder Pendulum with Table Support  - 1 x daily - 7 x weekly - 1 sets - 20 reps  - Flexion-Extension Shoulder Pendulum with Table Support  - 1 x daily - 7 x weekly - 1 sets - 20 reps  - Horizontal Shoulder Pendulum with Table Support  - 1 x daily - 7 x weekly - 1 sets - 20 reps  - Seated Scapular Retraction  - 1 x daily - 7 x weekly - 1-2 sets - 10 reps - 5 hold    Assessment:  PT Assessment  Assessment Comment: Patient presents with possible cervical and/or right rotator cuff involvement contributing to pain and functional limitations with right upper extremity.  Management of the patient's low back pain we will continue but at this time treatment she will shift to include right shoulder and upper extremity pain which is limiting ability to perform low back exercises as well as functional tasks for ADLs. Recommend f/u with MD for further assessment of the shoulder with possible imaging.    Plan:  Treatment/Interventions: Blood flow restriction therapy, Dry needling, Education/ Instruction, Manual therapy, Neuromuscular re-education, Self care/ home management,  Therapeutic activities, Therapeutic exercises, Vasopneumatic device, Electrical stimulation, Cryotherapy, Hot pack  PT Plan: Skilled PT (Monitor response to therapy and progress ROM and strength per patient tolerance)  PT Frequency: 2 times per week  Duration: 4 weeks  Onset Date: 01/01/23       Goals:  STG (Expected End 1/11/2024)  1) Patient will improve Quick Dash score by 10% to show an improvement in function.  2) Patient will show an improvement in shoulder flexion ROM in order to allow for improved ability to perform household chores.  3) Patient will be able to perform ADLs without pain exceeding 8/10 on the VAS.  4) Patient will be independent with HEP in 3 visits in order to allow for improved function with home and community tasks.    LTG (Expected End 2/1/2024)  1) Patient will improve Quick Dash score to </=15% to show an improvement in function.  2) Patient will improve scapular and shld strength to 5/5 in order to reduce compensatory guarding in upper trapezius and greater functional use of upper extremities.   3) Patient will be able to perform ADLs without pain exceeding 6/10 on the VAS.  4) Patient will return to all work related tasks to improve QOL.       Chase Holland, PT

## 2023-12-31 DIAGNOSIS — K21.9 GASTROESOPHAGEAL REFLUX DISEASE WITHOUT ESOPHAGITIS: ICD-10-CM

## 2024-01-01 RX ORDER — PANTOPRAZOLE SODIUM 40 MG/1
40 TABLET, DELAYED RELEASE ORAL DAILY
Qty: 90 TABLET | Refills: 0 | Status: SHIPPED | OUTPATIENT
Start: 2024-01-01 | End: 2024-05-28 | Stop reason: SDUPTHER

## 2024-01-02 ENCOUNTER — OFFICE VISIT (OUTPATIENT)
Dept: PRIMARY CARE | Facility: CLINIC | Age: 69
End: 2024-01-02
Payer: COMMERCIAL

## 2024-01-02 ENCOUNTER — DOCUMENTATION (OUTPATIENT)
Dept: PHYSICAL THERAPY | Facility: CLINIC | Age: 69
End: 2024-01-02

## 2024-01-02 ENCOUNTER — APPOINTMENT (OUTPATIENT)
Dept: PRIMARY CARE | Facility: CLINIC | Age: 69
End: 2024-01-02
Payer: COMMERCIAL

## 2024-01-02 VITALS
DIASTOLIC BLOOD PRESSURE: 62 MMHG | BODY MASS INDEX: 39.95 KG/M2 | SYSTOLIC BLOOD PRESSURE: 128 MMHG | HEIGHT: 64 IN | WEIGHT: 234 LBS

## 2024-01-02 DIAGNOSIS — E11.9 TYPE 2 DIABETES MELLITUS WITHOUT COMPLICATION, WITHOUT LONG-TERM CURRENT USE OF INSULIN (MULTI): ICD-10-CM

## 2024-01-02 DIAGNOSIS — M25.511 ACUTE PAIN OF RIGHT SHOULDER: ICD-10-CM

## 2024-01-02 PROCEDURE — 99214 OFFICE O/P EST MOD 30 MIN: CPT | Performed by: INTERNAL MEDICINE

## 2024-01-02 PROCEDURE — 4010F ACE/ARB THERAPY RXD/TAKEN: CPT | Performed by: INTERNAL MEDICINE

## 2024-01-02 PROCEDURE — 3074F SYST BP LT 130 MM HG: CPT | Performed by: INTERNAL MEDICINE

## 2024-01-02 PROCEDURE — 3078F DIAST BP <80 MM HG: CPT | Performed by: INTERNAL MEDICINE

## 2024-01-02 PROCEDURE — 1159F MED LIST DOCD IN RCRD: CPT | Performed by: INTERNAL MEDICINE

## 2024-01-02 PROCEDURE — 1036F TOBACCO NON-USER: CPT | Performed by: INTERNAL MEDICINE

## 2024-01-02 PROCEDURE — 1125F AMNT PAIN NOTED PAIN PRSNT: CPT | Performed by: INTERNAL MEDICINE

## 2024-01-02 RX ORDER — TIRZEPATIDE 7.5 MG/.5ML
7.5 INJECTION, SOLUTION SUBCUTANEOUS
Qty: 3 ML | Refills: 0 | Status: SHIPPED | OUTPATIENT
Start: 2024-01-02 | End: 2024-01-30 | Stop reason: SDUPTHER

## 2024-01-02 ASSESSMENT — ENCOUNTER SYMPTOMS
DEPRESSION: 0
LOSS OF SENSATION IN FEET: 0
OCCASIONAL FEELINGS OF UNSTEADINESS: 0

## 2024-01-02 NOTE — PROGRESS NOTES
Subjective   Patient ID: Stefanie Garnett is a 68 y.o. female who presents for Follow-up.    Med Refill    Patient is here with complaints of having right shoulder pain.  On September 12 patient fell down backwards while trying to sit on the couch and she landed on the right shoulder.  She had x-ray done in the urgent care which was unremarkable.  She did massage  She is doing physical therapy but is not helping she is having difficult time raising her arm above the head   Patient is also here for refill on Mounjaro it is helping her wants to increase the dose      Past recap   patient is here for follow-up on obesity.  She also lives in her.  Lost 10 pounds.  Only side effect is constipation but taking over-the-counter stool softeners and helping       her patient had appointment with the plastic surgeon  She is going for breast reduction  She needs to lose some weight and she also needs mammogram  Mounjaro is helping to lose some weight she lost 5 pounds  She wants to increase the dose as she is tolerating it well  She is having problems with constipation for Mounjaro she was in the urgent care  Because she slid down the stairs and hurt the right shoulder was not able to move x-ray was negative     patient here for follow-up  Did blood work  Got new CPAP machine  She is using her CPAP machine regularly  Follow-up on hypertension high cholesterol  Having a lot of acid  Not able to lose weight.  Does not follow diet  Does not exercise  Fell 2 weeks ago and hurting on the right shoulder.  Range of motion of the shoulder is normal  She is gaining weight and her breast size is getting weightbearing and causing her back pain wants with reduction surgerypast recasweta  Did pulmonary function test  Did 2D echo  She needs clearance from cardiologist for her work     Patient is here for follow-up on blood work  She quit taking her metformin  She quit using CPAP machine because of the cough  She did pulmonary function test  "but results are not available  She saw the allergist and sleep doctor who recommended her to wash the tubing to avoid coughing  Patient coughs only at night during the day she is fine  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  Patient has not lost weight  She has not changed her eating habits yet        Patient is here for follow-up on obstructive sleep apnea  She is using the CPAP machine it is helping her  Follow-up on hypertension high cholesterol coronary artery disease   needs medication refill  Not watching diet gained weight     Needs flu shot  Overdue for GYN exam  She is seriously considering gastric bypass surgery because she is not able to lose weight she wants insurance papers filled for her hospital stay for MI     She was not using her CPAP machine  Came to the hospital with chest pain and non-ST elevation MI had repeat cardiac catheter done did not show any significant blockage  Patient was treated with antibiotic and steroid and feeling better  Her blood pressure was high and Norvasc was started She has degenerative changes in the lower back so she cannot do heavy lifting     Review of Systems    Objective   /62   Ht 1.626 m (5' 4\")   Wt 106 kg (234 lb)   BMI 40.17 kg/m²     Physical Exam  Vitals reviewed.   Constitutional:       Appearance: Normal appearance. She is obese.   HENT:      Head: Normocephalic and atraumatic.      Right Ear: Tympanic membrane, ear canal and external ear normal.      Left Ear: Tympanic membrane, ear canal and external ear normal.      Nose: Nose normal.      Mouth/Throat:      Pharynx: Oropharynx is clear.   Eyes:      Extraocular Movements: Extraocular movements intact.      Conjunctiva/sclera: Conjunctivae normal.      Pupils: Pupils are equal, round, and reactive to light.   Cardiovascular:      Rate and Rhythm: Normal rate and regular rhythm.      Pulses: Normal pulses.      Heart sounds: Normal heart sounds.   Pulmonary: "      Effort: Pulmonary effort is normal.      Breath sounds: Normal breath sounds.   Abdominal:      General: Abdomen is flat. Bowel sounds are normal.      Palpations: Abdomen is soft.   Musculoskeletal:         General: Tenderness present.      Cervical back: Normal range of motion and neck supple.      Comments: Right shoulder tenderness and limited range of motion   Skin:     General: Skin is warm and dry.   Neurological:      General: No focal deficit present.      Mental Status: She is alert and oriented to person, place, and time.   Psychiatric:         Mood and Affect: Mood normal.         Assessment/Plan   Problem List Items Addressed This Visit             ICD-10-CM       Endocrine/Metabolic    Diabetes mellitus (CMS/HCC) E11.9    Relevant Medications    tirzepatide (Mounjaro) 7.5 mg/0.5 mL pen injector       Musculoskeletal and Injuries    Shoulder pain M25.519    Relevant Orders    Referral to Physical Therapy    MR shoulder right w IV contrast   past recap  1. CAD. Had NSTEMI August 2015. Catheter showed no definite high-grade culprit lesion that would necessitate intervention. There was some moderate disease in the distal aspect of the first marginal branch of the LCx, but otherwise the patient had very minimal CAD. Echocardiogram done June 2017 showed an EF of 60-65%. Patient had repeat exercise nuclear stress test June 2017 which was negative for ischemia. Patient had again acute MI cardiac catheter did not show any blockage. MI was attributed to coronary spasm     2. Hypertension. Adequately controlled      3. Hyperlipidemia. . Continue atorvastatin 80 mg daily as well as Zetia 10 mg daily.     4. Obesity. Weight loss was encouraged. Refer to Dr. Skip Suarez for considering gastric bypass     5. Sleep apnea. Using CPAP     6. Carotid ultrasound. No hemodynamically significant stenosis when done June 2017.     7. Diabetes  A1c 6.4  Doing better  Continue Metformin     #8 acute sinusitis  Treat with  Z-Tien     Flu shot given  Mammogram ordered  Meds refilled for 6 months          6/27/2023  Blood work reviewed  A1c 6.3 slightly up  Cholesterol under control  Patient is using CPAP with improvement in symptoms uses every night 4 to 6 hours  Patient is not able to lose weight again discussed various options  Encourage diet and exercise   10/7/2023  X-ray of the right shoulder not needed as joint has full range of motion  Treat with Flexeril and Voltaren gel topically  Refer to plastics for breast hypertrophy  Blood work reviewed  Diabetes under control at 6.2  Cholesterol under control  Blood pressure stable TSH is slightly out of range which has happened in the past  We will monitor for now  Discussed diet and exercise to lose weight  We will try Wegovy versus Mounjaro what ever insurance covers to help her lose weight  Using CPAP  Follow-up blood work in 3 months    10/31/2023  Will increase dose of Mounjaro  Again discussed diet and exercise  Mammogram ordered  Call us 100 twice a day for constipation  She had a fall she slid down the stairs went to the urgent care and her right shoulder she hurt but now she is able to move it  Examination is normal  Follow-up in a month    11/27/2023  Patient is tolerating medication well had good response  Will increase the dose    1/2/2024  Patient probably has frozen shoulder  Will get MRI of the right shoulder  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

## 2024-01-02 NOTE — PROGRESS NOTES
Therapy Communication Note    Patient Name: Stefanie Garnett  MRN: [id number]  Today's Date: 1/2/2024     Discipline: Physical Therapy    Missed Visit Reason:  Was informed by PSR that pt did not show for today's visit.      Missed Time: No Show    Comment:

## 2024-01-04 ENCOUNTER — APPOINTMENT (OUTPATIENT)
Dept: PHYSICAL THERAPY | Facility: CLINIC | Age: 69
End: 2024-01-04
Payer: COMMERCIAL

## 2024-01-04 DIAGNOSIS — E78.5 HYPERLIPIDEMIA, UNSPECIFIED: ICD-10-CM

## 2024-01-04 DIAGNOSIS — E11.9 TYPE 2 DIABETES MELLITUS WITHOUT COMPLICATIONS (MULTI): ICD-10-CM

## 2024-01-04 DIAGNOSIS — I10 ESSENTIAL (PRIMARY) HYPERTENSION: ICD-10-CM

## 2024-01-04 DIAGNOSIS — I25.10 ARTERIOSCLEROSIS OF CORONARY ARTERY: ICD-10-CM

## 2024-01-11 ENCOUNTER — APPOINTMENT (OUTPATIENT)
Dept: PHYSICAL THERAPY | Facility: CLINIC | Age: 69
End: 2024-01-11
Payer: COMMERCIAL

## 2024-01-16 ENCOUNTER — OFFICE VISIT (OUTPATIENT)
Dept: ORTHOPEDIC SURGERY | Facility: CLINIC | Age: 69
End: 2024-01-16
Payer: COMMERCIAL

## 2024-01-16 DIAGNOSIS — M54.12 CERVICAL RADICULOPATHY: Primary | ICD-10-CM

## 2024-01-16 PROCEDURE — 1036F TOBACCO NON-USER: CPT | Performed by: STUDENT IN AN ORGANIZED HEALTH CARE EDUCATION/TRAINING PROGRAM

## 2024-01-16 PROCEDURE — 1125F AMNT PAIN NOTED PAIN PRSNT: CPT | Performed by: STUDENT IN AN ORGANIZED HEALTH CARE EDUCATION/TRAINING PROGRAM

## 2024-01-16 PROCEDURE — 4010F ACE/ARB THERAPY RXD/TAKEN: CPT | Performed by: STUDENT IN AN ORGANIZED HEALTH CARE EDUCATION/TRAINING PROGRAM

## 2024-01-16 PROCEDURE — 1159F MED LIST DOCD IN RCRD: CPT | Performed by: STUDENT IN AN ORGANIZED HEALTH CARE EDUCATION/TRAINING PROGRAM

## 2024-01-16 PROCEDURE — 99204 OFFICE O/P NEW MOD 45 MIN: CPT | Performed by: STUDENT IN AN ORGANIZED HEALTH CARE EDUCATION/TRAINING PROGRAM

## 2024-01-16 RX ORDER — METHYLPREDNISOLONE 4 MG/1
TABLET ORAL
Qty: 21 TABLET | Refills: 0 | Status: SHIPPED | OUTPATIENT
Start: 2024-01-16 | End: 2024-01-23

## 2024-01-16 NOTE — PROGRESS NOTES
Stefanie Garnett is a 68 y.o. year-old  female  she is a new patient to our office and presents with a chief complaint of Right shoulder pain, arm and neck pain.  She notes she sort of fell out of a chair at her family member's house in September and since then has had pain.  It has been worse in the last month or so.  She was referred by Dr. Lew.  She notes the pain sort of begins in her shoulder but has radiated down her arm and into her hand.  It is unrelenting does never seem to go away wakes her at night has difficulty raising her arm she utilizes lidocaine patches muscle relaxants without relief.    Past Medical, Family, and Social History reviewed     Review of Systems  A complete review of systems was conducted, pertinent only to the HPI noted above.    Physical Exam  GEN: Alert and Oriented x 3  Constitutional: Well appearing, in no apparent distress.  Eyes: sclera anicteric  ENT: hearing appropriate for normal conversation, neck appears symmetric with no gross thyromegaly  Pulm: No labored breathing, no wheezing  CVS: Regular rate and rhythm  PSY: normal mood and affect  Skin: No rashes, erythema, or induration around shoulder     Focused Musculoskeletal Exam:     Side: Right shoulder:  PROM:   FE (170)   ER 60 ABER/ABIR: 90/90  AROM:   FE (170)   ER 45 IR T8  Strength:  Supra [5/5] Infra [5/5] Subscap [5/5]  Abd [5/5]    Special Tests  Painful with really any provocative tests of the shoulder has a lot of tenderness in her paraspinal muscles has a positive Spurling's        [Sensation intact Ax/median/ulnar/radial distributions  Motor intact Ax/median/radial/ulnar/AIN/PIN    X-rays of the shoulder independently viewed and interpreted: Mild degenerative changes    The patient history, physical examination and imaging studies are consistent with the aforementioned assessment. I explained to the patient that I believe that the majority of the symptoms are from the cervical spine rather than the  shoulder. The symptoms including paresthesias and radiating pain into the hand are not consistent with shoulder pathology. Given the history, physical examination and radiographic findings, I believe that a more formal evaluation of the cervical spine is necessary. In this regard, I have referred the patient to the [facility] for consideration of epidural injection.  Will prescribe Medrol Dosepak.. They will follow-up with me on a PRN basis. All questions were answered. The patient acknowledged the explanation, agreed to proceed with the plan

## 2024-01-18 ENCOUNTER — EVALUATION (OUTPATIENT)
Dept: PHYSICAL THERAPY | Facility: CLINIC | Age: 69
End: 2024-01-18
Payer: COMMERCIAL

## 2024-01-18 DIAGNOSIS — M25.511 ACUTE PAIN OF RIGHT SHOULDER: ICD-10-CM

## 2024-01-18 PROCEDURE — 97110 THERAPEUTIC EXERCISES: CPT | Mod: GP | Performed by: PHYSICAL THERAPIST

## 2024-01-18 PROCEDURE — 97140 MANUAL THERAPY 1/> REGIONS: CPT | Mod: GP | Performed by: PHYSICAL THERAPIST

## 2024-01-18 RX ORDER — EZETIMIBE 10 MG/1
10 TABLET ORAL DAILY
Qty: 90 TABLET | Refills: 0 | Status: SHIPPED | OUTPATIENT
Start: 2024-01-18 | End: 2024-05-28 | Stop reason: SDUPTHER

## 2024-01-18 RX ORDER — METFORMIN HYDROCHLORIDE 1000 MG/1
1000 TABLET ORAL 2 TIMES DAILY
Qty: 180 TABLET | Refills: 0 | Status: SHIPPED | OUTPATIENT
Start: 2024-01-18 | End: 2024-02-22 | Stop reason: WASHOUT

## 2024-01-18 RX ORDER — AMLODIPINE BESYLATE 5 MG/1
5 TABLET ORAL DAILY
Qty: 90 TABLET | Refills: 0 | Status: SHIPPED | OUTPATIENT
Start: 2024-01-18 | End: 2024-05-28 | Stop reason: SDUPTHER

## 2024-01-18 RX ORDER — ATORVASTATIN CALCIUM 80 MG/1
80 TABLET, FILM COATED ORAL NIGHTLY
Qty: 90 TABLET | Refills: 0 | Status: SHIPPED | OUTPATIENT
Start: 2024-01-18 | End: 2024-05-28 | Stop reason: SDUPTHER

## 2024-01-18 RX ORDER — METOPROLOL TARTRATE 50 MG/1
50 TABLET ORAL 2 TIMES DAILY
Qty: 180 TABLET | Refills: 0 | Status: SHIPPED | OUTPATIENT
Start: 2024-01-18 | End: 2024-05-28 | Stop reason: SDUPTHER

## 2024-01-18 ASSESSMENT — PAIN SCALES - GENERAL
PAINLEVEL_OUTOF10: 5 - MODERATE PAIN
PAINLEVEL_OUTOF10: 6
PAINLEVEL_OUTOF10: 0 - NO PAIN

## 2024-01-18 ASSESSMENT — PAIN - FUNCTIONAL ASSESSMENT: PAIN_FUNCTIONAL_ASSESSMENT: 0-10

## 2024-01-18 NOTE — PROGRESS NOTES
"  Physical Therapy Treatment/Progress Note    Patient Name: Stefanie Garnett  MRN: [id number]  Today's Date: 1/18/2024  Time Calculation  Start Time: 0945  Stop Time: 1030  Time Calculation (min): 45 min    Current Problem  1. Acute pain of right shoulder  Referral to Physical Therapy          Insurance:  Number of Treatments Authorized: 9/12        Payor: GENERIC COMMERCIAL / Plan: GENERIC COMMERCIAL / Product Type: *No Product type* /     Subjective   General  Reason for Referral: Neck Pain/R Shoulder Pain/Low Back Pain  Referred By: Dr. Lizz Lew  Past Medical History Relevant to Rehab: HTN (REVIEWED MEDICAL HISTORY)  General Comment: Patient reports that she had a f/u visit with the orthopedic MD and he stated that he believed her R UE pain was being referred from the cervical spine. She reports that she continues to have increased pain in the R shoulder but also has started to develop some increased pain in the R cervical spine which has referred to her \"ear\" and causes headaches intermittently. She was prescribed Prednisone yesterday and notes that it has started helping the symptoms.    Performing HEP?: Yes    Precautions  Precautions  Precautions Comment: Moderate Fall Risk  Pain  Pain Assessment: 0-10  Pain Score: 0 - No pain  Pain Location: Back (#1 (I,V): Lower lumbar spine, 0-9/10, \"sharp/aching\"; #2 (I): R anterior thigh, \"numbness\")  Multiple Pain Sites: Three    Objective   Cervical Spine    Observation  C-Spine Observation Comment: Increased thoracic kyphosis with decreased cervical lordosis  C-Spine Palpation/Joint Mobility Assessment   C-Spine Palpation/Joint Mobility Assessment:: Tenderness to the R cervical paraspinals  Cervical AROM  Cervical flexion: (80°): 50° (#4)  Cervical extension: (50°): 50°  Cervical rotation right: (80°): 65°  Cervical rotation left: (80°): 65° (#4)  Cervical sidebend right: (45°): 20°  Cervical sidebend left: (45°): 20° (#4)    Myotomes (MMT) 5/5 unless listed " below  R Shoulder Abduction (C5 ): (5/5): 4/5 (Pain inhibition)    Dermatomes  Dermatomes WFL: yes  Special Tests  Cervical rotation < 60 degrees : Negative  Spurling’s Test: (Negative): Postive  Cervical Distraction Test: (Negative): Positive  Median Nerve ULTT: (Negative): Unable to assess due to R shoulder pain  Other: No change with repeated retractions       and Shoulder    Observation  Shoulder Observation Comment: Guarding posturing involving R UE and pain with movement in all planes  Shoulder palpation/Joint Assessment  Shoulder Palpation/Joint Mobility Comment: Tenderness R UT/deltoid    Shoulder AROM  R Shoulder flexion: (180°): 60° (#3)  L Shoulder flexion: (180°): 165°  R shoulder abduction: (180°): 30° (#3)  L Shoulder abduction: (180°): 150°  Shoulder PROM  R shoulder flexion: (180°): 100° (#3 at end-range)  L shoulder abduction: (180°): 75° (#3 at end-range)  R shoulder ER: (90°): 40° (#3 at end-range)  R shoulder IR: (70°): 25° (#3 at end-range)  Cervical Myotomes (MMT     Shoulder Strength  R shoulder flexion: (5/5): 2+/5 (Pain evaluation)  R shoulder abduction: (5/5): 2+/5 (pain inhibition)  R shoulder ER: (5/5): 2+/5 (Pain inhibition)  R shoulder IR: (5/5) : 3+/5 (pain inhibition)    Shoulder Special  Painful arc: Negative: Positive  Infraspinatus MMT: Negative: Positive  Drop Arm Test: Negative: Positive  UE Special Tests Comments: Spurlings/Distraction (-)    Outcome Measures:  Other Measures  Disability of Arm Shoulder Hand (DASH): 61.26 (QuickDASH)    Treatments:    Therapeutic Exercise  Therapeutic Exercise Activity 1: PROM R shoulder all planes  Therapeutic Exercise Activity 2: HEP Education and demonstration with sets and reps as noted. Pt with good understanding and demonstration.  Therapeutic Exercise Activity 3: Seated Retractions, 2x10         Manual Therapy  Manual Therapy Activity 1: Joint mobilizations: R shoulder inf/post, Gr. I/II for pain  Manual Therapy Activity 2: STM R  UT/lateral shoulder  Manual Therapy Activity 3: STM R cervical paraspinals      OP EDUCATION:  Outpatient Education  Individual(s) Educated: Patient  Education Provided: Home Exercise Program  Patient/Caregiver Demonstrated Understanding: yes  Education Comment: Access Code: [email or web address]  URL: [email or web address]  Date: 01/18/2024  Prepared by: Chase Holland    Exercises  - Seated Scapular Retraction  - 1 x daily - 7 x weekly - 2 sets - 10 reps - 5 hold  - Seated Cervical Sidebending Stretch  - 1 x daily - 7 x weekly - 1 sets - 3 reps - 30 hold  - Seated Cervical Retraction  - 3-4 x daily - 7 x weekly - 1 sets - 10 reps    Assessment:  PT Assessment  Assessment Comment: Patient with fair tolerance to treatment overall today with increased symptoms in the right cervical spine, right shoulder/upper extremity throughout the session.  Patient's symptoms seem to vary in location, intensity and response to movement or activity.  Will continue to monitor symptoms and patient response or working to centralize pain and improve tolerance to functional activities for ADLs.    Plan:  Treatment/Interventions: Blood flow restriction therapy, Dry needling, Education/ Instruction, Manual therapy, Neuromuscular re-education, Self care/ home management, Therapeutic activities, Therapeutic exercises, Vasopneumatic device, Electrical stimulation, Cryotherapy, Hot pack  PT Plan: Skilled PT (Monitor response to therapy and progress ROM and strength per patient tolerance)  PT Frequency: 2 times per week  Duration: 4 weeks  Onset Date: 01/01/23       Goals:  STG  1) Patient will improve Quick Dash score by 10% to show an improvement in function.  2) Patient will show an improvement in shoulder flexion ROM in order to allow for improved ability to perform household chores.  3) Patient will be able to perform ADLs without pain exceeding 8/10 on the VAS.  4) Patient will be independent with HEP in 3 visits in order to  allow for improved function with home and community tasks.  LTG   1) Patient will improve Quick Dash score to </=15% to show an improvement in function.  2) Patient will improve scapular and shld strength to 5/5 in order to reduce compensatory guarding in upper trapezius and greater functional use of upper extremities.   3) Patient will be able to perform ADLs without pain exceeding 6/10 on the VAS.  4) Patient will return to all work related tasks to improve QOL.     Chase Holland, PT

## 2024-01-20 ENCOUNTER — LAB (OUTPATIENT)
Dept: LAB | Facility: LAB | Age: 69
End: 2024-01-20
Payer: COMMERCIAL

## 2024-01-20 DIAGNOSIS — M54.9 BACK PAIN, UNSPECIFIED BACK LOCATION, UNSPECIFIED BACK PAIN LATERALITY, UNSPECIFIED CHRONICITY: ICD-10-CM

## 2024-01-20 DIAGNOSIS — E66.9 DIABETES MELLITUS TYPE 2 IN OBESE: ICD-10-CM

## 2024-01-20 DIAGNOSIS — M25.511 ACUTE PAIN OF RIGHT SHOULDER: ICD-10-CM

## 2024-01-20 DIAGNOSIS — N62 BREAST HYPERTROPHY: ICD-10-CM

## 2024-01-20 DIAGNOSIS — Z23 ENCOUNTER FOR IMMUNIZATION: ICD-10-CM

## 2024-01-20 DIAGNOSIS — E11.69 DIABETES MELLITUS TYPE 2 IN OBESE: ICD-10-CM
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
## 2024-01-20 LAB
ALBUMIN SERPL BCP-MCNC: 4.3 G/DL (ref 3.4–5)
ALP SERPL-CCNC: 76 U/L (ref 33–136)
ALT SERPL W P-5'-P-CCNC: 56 U/L (ref 7–45)
ANION GAP SERPL CALC-SCNC: 12 MMOL/L (ref 10–20)
AST SERPL W P-5'-P-CCNC: 21 U/L (ref 9–39)
BILIRUB SERPL-MCNC: 0.8 MG/DL (ref 0–1.2)
BUN SERPL-MCNC: 17 MG/DL (ref 6–23)
CALCIUM SERPL-MCNC: 9.9 MG/DL (ref 8.6–10.6)
CHLORIDE SERPL-SCNC: 104 MMOL/L (ref 98–107)
CHOLEST SERPL-MCNC: 142 MG/DL (ref 0–199)
CHOLESTEROL/HDL RATIO: 2.9
CO2 SERPL-SCNC: 31 MMOL/L (ref 21–32)
CREAT SERPL-MCNC: 0.86 MG/DL (ref 0.5–1.05)
EGFRCR SERPLBLD CKD-EPI 2021: 75 ML/MIN/1.73M*2
ERYTHROCYTE [DISTWIDTH] IN BLOOD BY AUTOMATED COUNT: 15.6 % (ref 11.5–14.5)
EST. AVERAGE GLUCOSE BLD GHB EST-MCNC: 120 MG/DL
GLUCOSE SERPL-MCNC: 75 MG/DL (ref 74–99)
HBA1C MFR BLD: 5.8 %
HCT VFR BLD AUTO: 43.3 % (ref 36–46)
HDLC SERPL-MCNC: 49.4 MG/DL
HGB BLD-MCNC: 14.3 G/DL (ref 12–16)
LDLC SERPL CALC-MCNC: 81 MG/DL
MCH RBC QN AUTO: 26.3 PG (ref 26–34)
MCHC RBC AUTO-ENTMCNC: 33 G/DL (ref 32–36)
MCV RBC AUTO: 80 FL (ref 80–100)
NON HDL CHOLESTEROL: 93 MG/DL (ref 0–149)
NRBC BLD-RTO: 0 /100 WBCS (ref 0–0)
PLATELET # BLD AUTO: 223 X10*3/UL (ref 150–450)
POTASSIUM SERPL-SCNC: 4.2 MMOL/L (ref 3.5–5.3)
PROT SERPL-MCNC: 6.7 G/DL (ref 6.4–8.2)
RBC # BLD AUTO: 5.43 X10*6/UL (ref 4–5.2)
SODIUM SERPL-SCNC: 143 MMOL/L (ref 136–145)
TRIGL SERPL-MCNC: 59 MG/DL (ref 0–149)
VLDL: 12 MG/DL (ref 0–40)
WBC # BLD AUTO: 10.5 X10*3/UL (ref 4.4–11.3)

## 2024-01-20 PROCEDURE — 85027 COMPLETE CBC AUTOMATED: CPT

## 2024-01-20 PROCEDURE — 80053 COMPREHEN METABOLIC PANEL: CPT

## 2024-01-20 PROCEDURE — 36415 COLL VENOUS BLD VENIPUNCTURE: CPT

## 2024-01-20 PROCEDURE — 83036 HEMOGLOBIN GLYCOSYLATED A1C: CPT

## 2024-01-20 PROCEDURE — 80061 LIPID PANEL: CPT

## 2024-01-24 ENCOUNTER — OFFICE VISIT (OUTPATIENT)
Dept: ORTHOPEDIC SURGERY | Facility: CLINIC | Age: 69
End: 2024-01-24
Payer: COMMERCIAL

## 2024-01-24 DIAGNOSIS — M54.12 CERVICAL RADICULOPATHY: Primary | ICD-10-CM

## 2024-01-24 PROCEDURE — 1125F AMNT PAIN NOTED PAIN PRSNT: CPT | Performed by: PHYSICIAN ASSISTANT

## 2024-01-24 PROCEDURE — 3044F HG A1C LEVEL LT 7.0%: CPT | Performed by: PHYSICIAN ASSISTANT

## 2024-01-24 PROCEDURE — 4010F ACE/ARB THERAPY RXD/TAKEN: CPT | Performed by: PHYSICIAN ASSISTANT

## 2024-01-24 PROCEDURE — 1159F MED LIST DOCD IN RCRD: CPT | Performed by: PHYSICIAN ASSISTANT

## 2024-01-24 PROCEDURE — 99214 OFFICE O/P EST MOD 30 MIN: CPT | Performed by: PHYSICIAN ASSISTANT

## 2024-01-24 PROCEDURE — 3048F LDL-C <100 MG/DL: CPT | Performed by: PHYSICIAN ASSISTANT

## 2024-01-24 PROCEDURE — 1036F TOBACCO NON-USER: CPT | Performed by: PHYSICIAN ASSISTANT

## 2024-01-24 RX ORDER — PREDNISONE 20 MG/1
20 TABLET ORAL DAILY
Qty: 7 TABLET | Refills: 0 | Status: SHIPPED | OUTPATIENT
Start: 2024-01-24 | End: 2024-02-22 | Stop reason: WASHOUT

## 2024-01-24 RX ORDER — METHOCARBAMOL 500 MG/1
TABLET, FILM COATED ORAL
Qty: 60 TABLET | Refills: 2 | Status: SHIPPED | OUTPATIENT
Start: 2024-01-24 | End: 2024-02-22 | Stop reason: WASHOUT

## 2024-01-24 ASSESSMENT — PAIN - FUNCTIONAL ASSESSMENT: PAIN_FUNCTIONAL_ASSESSMENT: 0-10

## 2024-01-24 ASSESSMENT — PAIN SCALES - GENERAL: PAINLEVEL_OUTOF10: 4

## 2024-01-24 NOTE — PROGRESS NOTES
Stefanie is a 68-year-old female reporting clinic today for evaluation of her neck pain with right arm radiculopathy.    Her symptoms started on 9/12 after she fell onto her right side.  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  She has right-sided neck pain that radiates into her right shoulder and down her arm.  She originally thought her pain was coming from her shoulder, she was seen by Dr. Perez who referred her to a spine provider.  She has been doing physical therapy for about 1 month with minimal improvement of her symptoms.  She tried a Medrol Dosepak which took the edge off her radicular symptoms.  Left-sided symptoms.  She has noticed a change in her balance, she has felt like she was going to fall when walking for no reason.  She has not noticed any change in her fine motor skills.    Family, social, and medical histories are obtained and reviewed.    ROS: All other systems have been reviewed and are negative except as previously noted in history of present illness.    Physical Exam:  Const: Well-appearing, well-nourished female in no distress.  Eyes: Normal appearing sclera and conjunctiva, no jaundice, pupils normal in appearance.  Neck: No masses or lymphadenopathy appreciated.  Resp: breathing comfortably, normal respiratory rate rate.  CV: No upper or lower extremity edema.  Musculoskeletal: Normal gait.  Unsteady tandem gait.  Cervical ROM is supple.  Strength exam of the upper extremities reveals 5/5 strength in all major muscle groups.  No intrinsic wasting.  Positive Spurling sign.    Neuro: Sensation is intact and equal bilaterally. Deep tendon reflexes are normal and symmetric.  No clonus, negative Swanson sign, negative Lhermitte's sign  Skin: Intact without any lesions, normal turgor.  Psych: Alert and oriented x3, normal mood and affect.    The plan is to further evaluate her ongoing cervical radiculopathy despite physical therapy and anti-inflammatory medications.  I am  concerned with her inability to heel:toe walk.  A MRI of the cervical spine was ordered today, this medically necessary to rule out spinal cord compression.  Prescriptions for prednisone 20 mg and methocarbamol 500 mg were sent to her pharmacy for inflammation and muscle spasms.  She will follow-up with me once the MRI is complete to discuss further treatment options, possibly including epidural injections versus surgical intervention.    **This note was dictated using speech recognition software and was not corrected for spelling or grammatical errors**

## 2024-01-26 ENCOUNTER — TREATMENT (OUTPATIENT)
Dept: PHYSICAL THERAPY | Facility: CLINIC | Age: 69
End: 2024-01-26
Payer: COMMERCIAL

## 2024-01-26 DIAGNOSIS — G89.29 CHRONIC BILATERAL LOW BACK PAIN WITHOUT SCIATICA: ICD-10-CM

## 2024-01-26 DIAGNOSIS — M79.601 PAIN OF RIGHT UPPER EXTREMITY: ICD-10-CM

## 2024-01-26 DIAGNOSIS — M25.511 ACUTE PAIN OF RIGHT SHOULDER: Primary | ICD-10-CM

## 2024-01-26 DIAGNOSIS — M54.50 CHRONIC BILATERAL LOW BACK PAIN WITHOUT SCIATICA: ICD-10-CM

## 2024-01-26 PROCEDURE — 97140 MANUAL THERAPY 1/> REGIONS: CPT | Mod: GP,CQ

## 2024-01-26 ASSESSMENT — PAIN SCALES - GENERAL
PAINLEVEL_OUTOF10: 1
PAINLEVEL_OUTOF10: 3
PAINLEVEL_OUTOF10: 3

## 2024-01-26 ASSESSMENT — PAIN - FUNCTIONAL ASSESSMENT: PAIN_FUNCTIONAL_ASSESSMENT: 0-10

## 2024-01-26 NOTE — PROGRESS NOTES
Physical Therapy Treatment    Patient Name: Stefanie Garnett  MRN: [id number]  Today's Date: 1/26/2024  Time Calculation  Start Time: 0951  Stop Time: 1030  Time Calculation (min): 39 min  Current Problem  1. Acute pain of right shoulder        2. Pain of right upper extremity        3. Chronic bilateral low back pain without sciatica            Insurance:  Payor: GENERIC COMMERCIAL / Plan: GENERIC COMMERCIAL / Product Type: *No Product type* /   Number of Treatments Authorized: 2/20  (HAD 8 VISITS LAST YEAR)          Subjective   General  Reason for Referral: Neck Pain/R Shoulder Pain/Low Back Pain  Referred By: Dr. Lizz Lew  Past Medical History Relevant to Rehab: HTN (REVIEWED MEDICAL HISTORY)  General Comment: PT STATES SHE IS GOING TO BE GETTING A MRI FOR HER NECK.  MD THINKS THE NERVE MIGHT BE GETTING COMPRESSED.  PT ALSO GIVEN PREDNISONE WHICH IS HELPING.    Performing HEP?: Yes    Precautions  Precautions  Precautions Comment: Moderate Fall Risk  Pain  Pain Assessment: 0-10  Pain Score: 1  Pain Location: Back    Objective   General Observation  General Observation: FWD POSTURE       Treatments:    Therapeutic Exercise  Therapeutic Exercise Performed: No         Manual Therapy  Manual Therapy Activity 1: MFR SOR, CRANIAL PULL  Manual Therapy Activity 2: DSTM / TPR UT, SCALENES, SCM, LEV SCAP, C-PARA  Manual Therapy Activity 3: CERV PA, LAT MOBS GRADE 1,2  Manual Therapy Activity 4: PROM CERV S/D, ROT  Manual Therapy Activity 5: STRETCH UT, SCALENES                   OP EDUCATION:  Outpatient Education  Education Comment: CONTINUE WITH CURRENT HEP    Assessment:  PT Assessment  Assessment Comment: PT WAS VERY TIGHT IN HER CERV MUSCLES R > L.  TENDER WITH LAT MOBS AT C3, C4 LEVELS. PT FELT DECREASED PAIN AFTER MANUAL AND WAS ABLE TO LIFT R UE WITHOUT ASSISTANCE FROM L UE.    Plan:  OP PT Plan  Treatment/Interventions: Blood flow restriction therapy, Dry needling, Education/ Instruction, Manual therapy,  Neuromuscular re-education, Self care/ home management, Therapeutic activities, Therapeutic exercises, Vasopneumatic device, Electrical stimulation, Cryotherapy, Hot pack  PT Plan: Skilled PT (Monitor response to therapy and progress ROM and strength per patient tolerance)  PT Frequency: 2 times per week  Duration: 4 weeks  Onset Date: 01/01/23  Number of Treatments Authorized: 2/20  (HAD 8 VISITS LAST YEAR)    Goals:  Active       PT Problem       STG - lumbar       Start:  11/27/23    Expected End:  12/18/23       1) Patient will improve Oswestry Low Back Disability Questionnaire by 10% in order to indicate a measurable improvement in daily function and ability to complete daily tasks.  2) Patient will be able to complete ADLs with pain less than 6/10 in lumbar region.  3) Patient will have 75% of normal lumbar ROM in order to allow for proper bathing and dressing.   4) Patient will be independent with HEP to allow for continued improvement in daily tasks at home and in the community in 3 visits.            LTG - lumbar       Start:  11/27/23    Expected End:  01/08/24       1) Patient will have 4+/5 strength in lateral hip musculature to aid in stability with ambulation on varied surfaces in community.  2) Patient will be able to perform proper squatting technique and sitting postures in order to prevent increased pain with daily tasks.  3) Patient will be able to perform >30 seconds of SLS on even ground in order to allow for safe ambulation and to demonstrate reduced fall risk within the community.   4) Patient will improve Oswestry Low Back Disability Questionnaire to </=15% in order to indicate a measurable improvement in daily function and ability to complete daily tasks.              PT Problem       STG - shoulder       Start:  12/21/23    Expected End:  01/11/24       1) Patient will improve Quick Dash score by 10% to show an improvement in function.  2) Patient will show an improvement in shoulder  flexion ROM in order to allow for improved ability to perform household chores.  3) Patient will be able to perform ADLs without pain exceeding 8/10 on the VAS.  4) Patient will be independent with HEP in 3 visits in order to allow for improved function with home and community tasks.           LTG - shoulder       Start:  12/21/23    Expected End:  02/01/24       1) Patient will improve Quick Dash score to </=15% to show an improvement in function.  2) Patient will improve scapular and shld strength to 5/5 in order to reduce compensatory guarding in upper trapezius and greater functional use of upper extremities.   3) Patient will be able to perform ADLs without pain exceeding 6/10 on the VAS.  4) Patient will return to all work related tasks to improve QOL.                Garry Shah, PTA

## 2024-01-30 ENCOUNTER — OFFICE VISIT (OUTPATIENT)
Dept: PRIMARY CARE | Facility: CLINIC | Age: 69
End: 2024-01-30
Payer: COMMERCIAL

## 2024-01-30 VITALS
DIASTOLIC BLOOD PRESSURE: 66 MMHG | BODY MASS INDEX: 38.93 KG/M2 | SYSTOLIC BLOOD PRESSURE: 124 MMHG | WEIGHT: 228 LBS | HEIGHT: 64 IN

## 2024-01-30 DIAGNOSIS — E66.01 MORBID OBESITY (MULTI): Primary | ICD-10-CM

## 2024-01-30 DIAGNOSIS — E11.9 TYPE 2 DIABETES MELLITUS WITHOUT COMPLICATION, WITHOUT LONG-TERM CURRENT USE OF INSULIN (MULTI): ICD-10-CM

## 2024-01-30 DIAGNOSIS — M75.101 NONTRAUMATIC TEAR OF RIGHT SUPRASPINATUS TENDON: ICD-10-CM

## 2024-01-30 PROCEDURE — 4010F ACE/ARB THERAPY RXD/TAKEN: CPT | Performed by: INTERNAL MEDICINE

## 2024-01-30 PROCEDURE — 3078F DIAST BP <80 MM HG: CPT | Performed by: INTERNAL MEDICINE

## 2024-01-30 PROCEDURE — 1036F TOBACCO NON-USER: CPT | Performed by: INTERNAL MEDICINE

## 2024-01-30 PROCEDURE — 3044F HG A1C LEVEL LT 7.0%: CPT | Performed by: INTERNAL MEDICINE

## 2024-01-30 PROCEDURE — 3074F SYST BP LT 130 MM HG: CPT | Performed by: INTERNAL MEDICINE

## 2024-01-30 PROCEDURE — 3048F LDL-C <100 MG/DL: CPT | Performed by: INTERNAL MEDICINE

## 2024-01-30 PROCEDURE — 1160F RVW MEDS BY RX/DR IN RCRD: CPT | Performed by: INTERNAL MEDICINE

## 2024-01-30 PROCEDURE — 1159F MED LIST DOCD IN RCRD: CPT | Performed by: INTERNAL MEDICINE

## 2024-01-30 PROCEDURE — 1125F AMNT PAIN NOTED PAIN PRSNT: CPT | Performed by: INTERNAL MEDICINE

## 2024-01-30 PROCEDURE — 99214 OFFICE O/P EST MOD 30 MIN: CPT | Performed by: INTERNAL MEDICINE

## 2024-01-30 RX ORDER — TIRZEPATIDE 7.5 MG/.5ML
7.5 INJECTION, SOLUTION SUBCUTANEOUS
Qty: 3 ML | Refills: 0 | Status: SHIPPED | OUTPATIENT
Start: 2024-01-30 | End: 2024-02-26 | Stop reason: DRUGHIGH

## 2024-01-30 ASSESSMENT — ENCOUNTER SYMPTOMS
LOSS OF SENSATION IN FEET: 0
DEPRESSION: 0
OCCASIONAL FEELINGS OF UNSTEADINESS: 0

## 2024-01-30 NOTE — LETTER
January 30, 2024     Patient: Stefanie Garnett   YOB: 1955   Date of Visit: 1/30/2024       To Whom It May Concern:    Stefanie Garnett was seen in my clinic on 1/30/2024 at 9:30 am. Please excuse Stefanie for her absence from work on 1/30/24, Stefanie can return to work on 2/2/24 day to make the appointment.    If you have any questions or concerns, please don't hesitate to call.         Sincerely,         Lizz Lew MD

## 2024-02-01 ENCOUNTER — OFFICE VISIT (OUTPATIENT)
Dept: ORTHOPEDIC SURGERY | Facility: CLINIC | Age: 69
End: 2024-02-01
Payer: COMMERCIAL

## 2024-02-01 DIAGNOSIS — M54.12 CERVICAL RADICULOPATHY: Primary | ICD-10-CM

## 2024-02-01 PROCEDURE — 1125F AMNT PAIN NOTED PAIN PRSNT: CPT | Performed by: PHYSICIAN ASSISTANT

## 2024-02-01 PROCEDURE — 99214 OFFICE O/P EST MOD 30 MIN: CPT | Performed by: PHYSICIAN ASSISTANT

## 2024-02-01 PROCEDURE — 3044F HG A1C LEVEL LT 7.0%: CPT | Performed by: PHYSICIAN ASSISTANT

## 2024-02-01 PROCEDURE — 1159F MED LIST DOCD IN RCRD: CPT | Performed by: PHYSICIAN ASSISTANT

## 2024-02-01 PROCEDURE — 3048F LDL-C <100 MG/DL: CPT | Performed by: PHYSICIAN ASSISTANT

## 2024-02-01 PROCEDURE — 1160F RVW MEDS BY RX/DR IN RCRD: CPT | Performed by: PHYSICIAN ASSISTANT

## 2024-02-01 PROCEDURE — 4010F ACE/ARB THERAPY RXD/TAKEN: CPT | Performed by: PHYSICIAN ASSISTANT

## 2024-02-01 PROCEDURE — 1036F TOBACCO NON-USER: CPT | Performed by: PHYSICIAN ASSISTANT

## 2024-02-01 ASSESSMENT — PAIN - FUNCTIONAL ASSESSMENT: PAIN_FUNCTIONAL_ASSESSMENT: 0-10

## 2024-02-01 ASSESSMENT — PAIN SCALES - GENERAL: PAINLEVEL_OUTOF10: 6

## 2024-02-06 NOTE — PROGRESS NOTES
Stefanie returns clinic today to review the results of her MRI.    Her symptoms started on 9/12 after she fell onto her right side.  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  She has right-sided neck pain that radiates into her right shoulder and down her arm.  She originally thought her pain was coming from her shoulder, she was seen by Dr. Perez who referred her to a spine provider.    Family, social, and medical histories are obtained and reviewed.    ROS: All other systems have been reviewed and are negative except as previously noted in history of present illness.    Physical Exam:  Const: Well-appearing, well-nourished female in no distress.  Eyes: Normal appearing sclera and conjunctiva, no jaundice, pupils normal in appearance.  Neck: No masses or lymphadenopathy appreciated.  Resp: breathing comfortably, normal respiratory rate rate.  CV: No upper or lower extremity edema.  Musculoskeletal: Normal gait.  Unsteady tandem gait.  Cervical ROM is supple.  Strength exam of the upper extremities reveals 5/5 strength in all major muscle groups.  No intrinsic wasting.  Positive Spurling sign.    Neuro: Sensation is intact and equal bilaterally. Deep tendon reflexes are normal and symmetric.  No clonus, negative Swanson sign, negative Lhermitte's sign  Skin: Intact without any lesions, normal turgor.  Psych: Alert and oriented x3, normal mood and affect.    I personally reviewed an MRI of the cervical spine completed today.  There is no evidence of high-grade central canal or foraminal stenosis.    I discussed with her that based on her symptoms and a fairly normal MRI I would not recommend surgical intervention.  She does have an appointment with a shoulder surgeon, she was told there was an abnormality on her shoulder MRI that could require surgery.  At this time for her neck pain I would recommend continued conservative treatment with physical therapy and anti-inflammatory medications.  I would  be happy to see her back on an as-needed basis.    **This note was dictated using speech recognition software and was not corrected for spelling or grammatical errors**

## 2024-02-07 ENCOUNTER — APPOINTMENT (OUTPATIENT)
Dept: ORTHOPEDIC SURGERY | Facility: CLINIC | Age: 69
End: 2024-02-07
Payer: COMMERCIAL

## 2024-02-08 ENCOUNTER — OFFICE VISIT (OUTPATIENT)
Dept: ORTHOPEDIC SURGERY | Facility: HOSPITAL | Age: 69
End: 2024-02-08
Payer: COMMERCIAL

## 2024-02-08 DIAGNOSIS — M75.101 NONTRAUMATIC TEAR OF RIGHT SUPRASPINATUS TENDON: ICD-10-CM

## 2024-02-08 DIAGNOSIS — S46.011A TRAUMATIC TEAR OF RIGHT ROTATOR CUFF, UNSPECIFIED TEAR EXTENT, INITIAL ENCOUNTER: Primary | ICD-10-CM

## 2024-02-08 DIAGNOSIS — S46.211A TRAUMATIC PARTIAL TEAR OF BICEPS TENDON, RIGHT, INITIAL ENCOUNTER: ICD-10-CM

## 2024-02-08 DIAGNOSIS — E11.9 TYPE 2 DIABETES MELLITUS WITHOUT COMPLICATION, WITHOUT LONG-TERM CURRENT USE OF INSULIN (MULTI): ICD-10-CM

## 2024-02-08 DIAGNOSIS — I10 BENIGN HYPERTENSION: ICD-10-CM

## 2024-02-08 PROCEDURE — 1125F AMNT PAIN NOTED PAIN PRSNT: CPT | Performed by: ORTHOPAEDIC SURGERY

## 2024-02-08 PROCEDURE — 3048F LDL-C <100 MG/DL: CPT | Performed by: ORTHOPAEDIC SURGERY

## 2024-02-08 PROCEDURE — 99204 OFFICE O/P NEW MOD 45 MIN: CPT | Performed by: ORTHOPAEDIC SURGERY

## 2024-02-08 PROCEDURE — 4010F ACE/ARB THERAPY RXD/TAKEN: CPT | Performed by: ORTHOPAEDIC SURGERY

## 2024-02-08 PROCEDURE — 1159F MED LIST DOCD IN RCRD: CPT | Performed by: ORTHOPAEDIC SURGERY

## 2024-02-08 PROCEDURE — 1036F TOBACCO NON-USER: CPT | Performed by: ORTHOPAEDIC SURGERY

## 2024-02-08 PROCEDURE — 1160F RVW MEDS BY RX/DR IN RCRD: CPT | Performed by: ORTHOPAEDIC SURGERY

## 2024-02-08 PROCEDURE — 3044F HG A1C LEVEL LT 7.0%: CPT | Performed by: ORTHOPAEDIC SURGERY

## 2024-02-08 RX ORDER — SODIUM CHLORIDE, SODIUM LACTATE, POTASSIUM CHLORIDE, CALCIUM CHLORIDE 600; 310; 30; 20 MG/100ML; MG/100ML; MG/100ML; MG/100ML
100 INJECTION, SOLUTION INTRAVENOUS CONTINUOUS
Status: CANCELLED | OUTPATIENT
Start: 2024-02-08

## 2024-02-08 RX ORDER — CEFAZOLIN SODIUM 2 G/100ML
2 INJECTION, SOLUTION INTRAVENOUS ONCE
Status: CANCELLED | OUTPATIENT
Start: 2024-02-08 | End: 2024-02-08

## 2024-02-08 ASSESSMENT — ENCOUNTER SYMPTOMS
WHEEZING: 0
FEVER: 0
ARTHRALGIAS: 1
CHILLS: 0
FATIGUE: 0
SHORTNESS OF BREATH: 0

## 2024-02-08 ASSESSMENT — PAIN SCALES - GENERAL: PAINLEVEL_OUTOF10: 8

## 2024-02-08 ASSESSMENT — PAIN - FUNCTIONAL ASSESSMENT: PAIN_FUNCTIONAL_ASSESSMENT: 0-10

## 2024-02-08 NOTE — PROGRESS NOTES
Reason for Appointment  Chief Complaint   Patient presents with    Right Shoulder - Pain     History of Present Illness  New patient is a 68 y.o. female here today for evaluation of left shoulder pain. MRI shows a full-thickness tear of the supraspinatus tendon, biceps tendonitis, AC joint arthritis. X-rays 10/2023 show no significant DJD, no large acromial spur. She fell 9/12/23 and was evaluated at the ER and diagnosed with arthritis. She has had continued pain since September, so she went to Dr. Lew who ordered the MRI. She has had a full cervical workup which was negative per patient. She has been in therapy for the shoulder and had a massage which helped a little. Today, she complains of lateral shoulder pain that radiates up into her neck. She works as a  and cannot work at this job currently.     Past Medical History:   Diagnosis Date    Arm pain     Arm swelling     Asymptomatic menopausal state     Back pain     Back pain     CAD (coronary artery disease)     CAD (coronary artery disease)     Carpal tunnel syndrome     Chest pain     Cough     Diabetes mellitus (CMS/HCC)     Dizziness     Elevated blood sugar     Exposure to COVID-19 virus     Fall     Floaters     GERD (gastroesophageal reflux disease)     High cholesterol     HPV in female     Hyperlipidemia     Hypertension     Ingrown toenail     Knee pain     Left arm numbness     Left foot pain     Left leg swelling     Meralgia paraesthetica, left     Moderate persistent atopic asthma without complication     Morbid obesity (CMS/HCC)     Numbness     Obesity     ANGELICA (obstructive sleep apnea)     Pain in arm, unspecified     Arm pain    Pain of lower extremity     Pain of upper extremity     Palpitations     Personal history of other medical treatment 04/03/2019    History of screening mammography    Positive TB test     Restrictive lung disease     Shoulder pain     Sleep apnea     Snoring     SOB (shortness of breath)     Tail bone  pain     Vaginal Pap smear        Past Surgical History:   Procedure Laterality Date    HYSTERECTOMY  2015    Hysterectomy    HYSTERECTOMY  2015    Hysterectomy    TUBAL LIGATION  2015    Tubal Ligation    TUBAL LIGATION  2015    Tubal Ligation       Medication Documentation Review Audit       Reviewed by Diane Kay MA (Medical Assistant) on 24 at 1440      Medication Order Taking? Sig Documenting Provider Last Dose Status   amLODIPine (Norvasc) 5 mg tablet [id number] Yes Take 1 tablet (5 mg) by mouth once daily. Lizz Lew MD Taking Active   aspirin-calcium carbonate 81 mg-300 mg calcium(777 mg) tablet [id number] Yes Take 1 tablet by mouth in the morning. Historical Provider, MD Taking Active   atorvastatin (Lipitor) 80 mg tablet [id number] Yes Take 1 tablet (80 mg) by mouth once daily at bedtime. Lizz Lew MD Taking Active   cholecalciferol (Vitamin D-3) 50 MCG (2000 UT) tablet [id number] Yes Take 1 tablet (2,000 Units) by mouth once daily. Historical Provider, MD Taking Active   cyclobenzaprine (Flexeril) 10 mg tablet [id number]  Take 1 tablet (10 mg) by mouth 3 times a day as needed for muscle spasms. Lizz Lew MD   23 2359   diclofenac sodium (Voltaren) 1 % gel gel [id number] Yes Apply 1 Application topically 4 times a day. Lizz Lew MD Taking Active   docusate sodium (Colace) 100 mg capsule [id number] Yes Take 1 capsule (100 mg) by mouth 2 times a day. Lizz Lew MD Taking Active   evolocumab (Repatha SureClick) 140 mg/mL injection [id number] Yes INJECT 140 MG (1 PEN) UNDER THE SKIN ONCE EVERY 2 WEEKS AS DIRECTED. Latanya Severino, APRN-CNP Taking Active   ezetimibe (Zetia) 10 mg tablet [id number] Yes Take 1 tablet (10 mg) by mouth once daily. Lizz Lew MD Taking Active   lisinopril 20 mg tablet [id number] Yes Take 1 tablet (20 mg) by mouth once daily. as directed Lizz Lew MD Taking Active   losartan (Cozaar) 100 mg tablet [id number] Yes Take 1 tablet (100  mg) by mouth once daily. Historical Provider, MD Taking Active   metFORMIN (Glucophage) 1,000 mg tablet [id number] Yes Take 1 tablet (1,000 mg) by mouth 2 times a day. Lizz Lew MD Taking Active   methocarbamol (Robaxin) 500 mg tablet [id number] Yes Take 1-2 tabs every 8 hours as needed for spasms Marimar Miller PA-C Taking Active   metoprolol tartrate (Lopressor) 50 mg tablet [id number] Yes Take 1 tablet (50 mg) by mouth 2 times a day. Lizz Lew MD Taking Active   omega 3-dha-epa-fish oil 360 mg-108 mg- 180 mg-1,200 mg capsule [id number] Yes Take 1 capsule by mouth once daily. Historical Provider, MD Taking Active   pantoprazole (ProtoNix) 40 mg EC tablet [id number] Yes TAKE 1 TABLET BY MOUTH ONCE DAILY Lizz Lew MD Taking Active   predniSONE (Deltasone) 20 mg tablet [id number] Yes Take 1 tablet (20 mg) by mouth once daily. Marimar Miller PA-C Taking Active   tirzepatide (Mounjaro) 5 mg/0.5 mL pen injector [id number] Yes Inject 5 mg under the skin 1 (one) time per week. Lizz Lew MD Taking Active   tirzepatide (Mounjaro) 7.5 mg/0.5 mL pen injector [id number] Yes Inject 7.5 mg under the skin 1 (one) time per week. Lizz Lew MD Taking Active                    No Known Allergies    Review of Systems   Constitutional:  Negative for chills, fatigue and fever.   Respiratory:  Negative for shortness of breath and wheezing.    Cardiovascular:  Negative for chest pain and leg swelling.   Musculoskeletal:  Positive for arthralgias.   All other systems reviewed and are negative.      Exam   On exam, there is good cervical ROM, no significant AC joint tenderness, she is tender around the trap, crepitation over the biceps with tenderness, weak and painful in external rotation, positive impingement sign, good elbow ROM, good biceps and triceps strength, good wrist flexion and extension strength, good pulses, good sensation, there is some arthritic change in the hands over the CMC joint, no hyperreflexia, negative  Reed's sign, good intrinsic strength, good pulses, good sensation, no skin changes    Assessment   Encounter Diagnoses   Name Primary?    Nontraumatic tear of right supraspinatus tendon     Benign hypertension     Type 2 diabetes mellitus without complication, without long-term current use of insulin (CMS/[facility])     Traumatic tear of right rotator cuff, unspecified tear extent, initial encounter Yes    Traumatic partial tear of biceps tendon, right, initial encounter      Plan   We had a long discussion about the treatment options for a rotator cuff tear. The only interventional treatment option at this point is arthroscopic cuff repair. We had a long discussion about the risks, benefits, and alternatives to surgery. Patient understands the procedure and recovery fully. Plan for right shoulder rotator cuff repair and biceps tenodesis.    I, Natalia Chen, attest that this documentation has been prepared under the direction and in the presence of Gume Fragoso MD. By signing below, I, Gume Fragoso MD, personally performed the services described in this documentation. All medical record entries made by the scribe were at my direction and in my presence. I have reviewed the chart and agree that the record reflects my personal performance and is accurate and complete. 02/08/24

## 2024-02-09 ENCOUNTER — TELEPHONE (OUTPATIENT)
Dept: ORTHOPEDIC SURGERY | Facility: HOSPITAL | Age: 69
End: 2024-02-09
Payer: COMMERCIAL

## 2024-02-09 ENCOUNTER — APPOINTMENT (OUTPATIENT)
Dept: PHYSICAL THERAPY | Facility: CLINIC | Age: 69
End: 2024-02-09
Payer: COMMERCIAL

## 2024-02-09 PROBLEM — S46.211A TRAUMATIC PARTIAL TEAR OF BICEPS TENDON, RIGHT, INITIAL ENCOUNTER: Status: ACTIVE | Noted: 2024-02-08

## 2024-02-09 PROBLEM — M75.101 NONTRAUMATIC TEAR OF RIGHT SUPRASPINATUS TENDON: Status: ACTIVE | Noted: 2024-02-08

## 2024-02-13 ENCOUNTER — APPOINTMENT (OUTPATIENT)
Dept: PHYSICAL THERAPY | Facility: CLINIC | Age: 69
End: 2024-02-13
Payer: COMMERCIAL

## 2024-02-21 ENCOUNTER — PRE-ADMISSION TESTING (OUTPATIENT)
Dept: PREADMISSION TESTING | Facility: HOSPITAL | Age: 69
End: 2024-02-21
Payer: COMMERCIAL

## 2024-02-21 VITALS
BODY MASS INDEX: 39.27 KG/M2 | RESPIRATION RATE: 16 BRPM | WEIGHT: 230 LBS | OXYGEN SATURATION: 100 % | HEIGHT: 64 IN | HEART RATE: 94 BPM | TEMPERATURE: 97 F | SYSTOLIC BLOOD PRESSURE: 119 MMHG | DIASTOLIC BLOOD PRESSURE: 63 MMHG

## 2024-02-21 DIAGNOSIS — Z01.818 PREOPERATIVE TESTING: Primary | ICD-10-CM

## 2024-02-21 PROCEDURE — 93010 ELECTROCARDIOGRAM REPORT: CPT | Performed by: INTERNAL MEDICINE

## 2024-02-21 PROCEDURE — 87081 CULTURE SCREEN ONLY: CPT | Mod: TRILAB,WESLAB

## 2024-02-21 PROCEDURE — 99203 OFFICE O/P NEW LOW 30 MIN: CPT | Performed by: NURSE PRACTITIONER

## 2024-02-21 PROCEDURE — 93005 ELECTROCARDIOGRAM TRACING: CPT

## 2024-02-21 RX ORDER — CHLORHEXIDINE GLUCONATE ORAL RINSE 1.2 MG/ML
SOLUTION DENTAL
Qty: 473 ML | Refills: 0 | Status: SHIPPED | OUTPATIENT
Start: 2024-02-21 | End: 2024-02-22 | Stop reason: WASHOUT

## 2024-02-21 RX ORDER — ASPIRIN 81 MG/1
81 TABLET ORAL DAILY
COMMUNITY

## 2024-02-21 ASSESSMENT — DUKE ACTIVITY SCORE INDEX (DASI)
CAN YOU CLIMB A FLIGHT OF STAIRS OR WALK UP A HILL: YES
CAN YOU DO MODERATE WORK AROUND THE HOUSE LIKE VACUUMING, SWEEPING FLOORS OR CARRYING GROCERIES: YES
CAN YOU DO LIGHT WORK AROUND THE HOUSE LIKE DUSTING OR WASHING DISHES: YES
CAN YOU WALK A BLOCK OR TWO ON LEVEL GROUND: YES
CAN YOU PARTICIPATE IN MODERATE RECREATIONAL ACTIVITIES LIKE GOLF, BOWLING, DANCING, DOUBLES TENNIS OR THROWING A BASEBALL OR FOOTBALL: NO
CAN YOU WALK INDOORS, SUCH AS AROUND YOUR HOUSE: YES
CAN YOU RUN A SHORT DISTANCE: NO
CAN YOU PARTICIPATE IN STRENOUS SPORTS LIKE SWIMMING, SINGLES TENNIS, FOOTBALL, BASKETBALL, OR SKIING: NO
DASI METS SCORE: 5.7
CAN YOU TAKE CARE OF YOURSELF (EAT, DRESS, BATHE, OR USE TOILET): YES
CAN YOU HAVE SEXUAL RELATIONS: YES
TOTAL_SCORE: 24.2
CAN YOU DO HEAVY WORK AROUND THE HOUSE LIKE SCRUBBING FLOORS OR LIFTING AND MOVING HEAVY FURNITURE: NO
CAN YOU DO YARD WORK LIKE RAKING LEAVES, WEEDING OR PUSHING A MOWER: NO

## 2024-02-21 ASSESSMENT — CHADS2 SCORE
PRIOR STROKE OR TIA OR THROMBOEMBOLISM: NO
DIABETES: YES
CHF: NO
CHADS2 SCORE: 2
HYPERTENSION: YES
AGE GREATER THAN OR EQUAL TO 75: NO

## 2024-02-21 ASSESSMENT — ENCOUNTER SYMPTOMS
GASTROINTESTINAL NEGATIVE: 1
EYES NEGATIVE: 1
MUSCULOSKELETAL NEGATIVE: 1
PSYCHIATRIC NEGATIVE: 1
ACTIVITY CHANGE: 1
RESPIRATORY NEGATIVE: 1
CARDIOVASCULAR NEGATIVE: 1

## 2024-02-21 ASSESSMENT — PAIN - FUNCTIONAL ASSESSMENT: PAIN_FUNCTIONAL_ASSESSMENT: 0-10

## 2024-02-21 ASSESSMENT — PAIN DESCRIPTION - DESCRIPTORS: DESCRIPTORS: SHARP

## 2024-02-21 ASSESSMENT — PAIN SCALES - GENERAL: PAINLEVEL_OUTOF10: 8

## 2024-02-21 NOTE — PREPROCEDURE INSTRUCTIONS
PAT DISCHARGE INSTRUCTIONS    Please call the Same Day Surgery (SDS) Department of the hospital where your procedure will be performed after 2:00 PM the day before your surgery. If you are scheduled on a Monday, or a Tuesday following a Monday holiday, you will need to call on the last business day prior to your surgery.    [facility]  [address] [address] [facility] [address] [facility], [address]  [phone]    [facility]  [address] [place] [address] [phone]    [facility]  [address]  [place] [id number]  [phone]    Please let your surgeon know if:      You develop any open sores, shingles, burning or painful urination as these may increase your risk of an infection.   You no longer wish to have the surgery.   Any other personal circumstances change that may lead to the need to cancel or defer this surgery-such as being sick or getting admitted to any hospital within one week of your planned procedure.    Your contact details change, such as a change of address or phone number.    Starting now:     Please DO NOT drink alcohol or smoke for 24 hours before surgery. It is well known that quitting smoking can make a huge difference to your health and recovery from surgery. The longer you abstain from smoking, the better your chances of a healthy recovery. If you need help with quitting, call 3-800-QUIT-NOW to be connected to a trained counselor who will discuss the best methods to help you quit.     Before your surgery:    Please stop all supplements 7 days prior to surgery. Or as directed by your surgeon.   Please stop taking NSAID pain medicine such as Advil and Motrin 7 days before surgery.    If you develop any fever, cough, cold, rashes, cuts, scratches, scrapes, urinary symptoms or infection anywhere on your body (including teeth and gums) prior to surgery, please call your surgeon’s office as soon as  possible. This may require treatment to reduce the chance of cancellation on the day of surgery.    The day before your surgery:   DIET- Do not eat or drink anything after midnight the night before surgery, including mints, candy and gum.   Get a good night’s rest.  Use the special soap for bathing if you have been instructed to use one.    Scheduled surgery times may change and you will be notified if this occurs - please check your personal voicemail for any updates.     On the morning of surgery:   Wear comfortable, loose fitting clothes which open in the front. Please do not wear moisturizers, creams, lotions, makeup or perfume.    Please bring with you to surgery:   Photo ID and insurance card   Current list of medicines and allergies   Pacemaker/ Defibrillator/Heart stent cards   CPAP machine and mask    Slings/ splints/ crutches   A copy of your complete advanced directive/DHPOA.    Please do NOT bring with you to surgery:   All jewelry and valuables should be left at home.   Prosthetic devices such as contact lenses, hearing aids, dentures, eyelash extensions, hairpins and body piercings must be removed prior to going in to the surgical suite.    After outpatient surgery:   A responsible adult MUST accompany you at the time of discharge and stay with you for 24 hours after your surgery. You may NOT drive yourself home after surgery.    Do not drive, operate machinery, make critical decisions or do activities that require co-ordination or balance until after a night’s sleep.   Do not drink alcoholic beverages for 24 hours.   Instructions for resuming your medications will be provided by your surgeon.    CALL YOUR DOCTOR AFTER SURGERY IF YOU HAVE:     Chills and/or a fever of 101° F or higher.    Redness, swelling, pus or drainage from your surgical wound or a bad smell from the wound.    Lightheadedness, fainting or confusion.    Persistent vomiting (throwing up) and are not able to eat or drink for 12 hours.     Three or more loose, watery bowel movements in 24 hours (diarrhea).   Difficulty or pain while urinating( after non-urological surgery)    Pain and swelling in your legs, especially if it is only on one side.    Difficulty breathing or are breathing faster than normal.    Any new concerning symptoms.     Home Preoperative Antibacterial Shower    What is a home preoperative antibacterial shower?  This shower is a way of cleaning the skin with a germ killing solution before surgery. The solution contains chlorhexidine, commonly known as CHG. CHG is a skin cleanser with germ killing ability. Let your doctor know if you are allergic to chlorhexidine.      Why do I need to take a preoperative antibacterial shower?  Skin is not sterile. It is best to try to make your skin as free of germs as possible before surgery. Proper cleansing with a germ killing soap before surgery can lower the number of germs on your skin. This helps to reduce the risk of infection at the surgical site. Following the instructions listed below will help you prepare your skin for surgery.    How do I use the solution?      Steps: Begin using your CHG soap FIVE DAYS BEFORE your scheduled surgery on __________________________________________________.  First, wash and rinse your hair using the CHG soap. Keep CHG away soap away from ear canals and eyes.  Rinse completely, do not condition. Hair extensions should be removed.  Wash your face with your normal soap and rinse.  Apply the CHG solution to a clean wet washcloth. Turn the water off or move away from the water spray to avoid premature rinsing of the CHG soap as you are applying.  Firmly lather your entire body from neck down. Do not use on face.  Pay special attention to the areas(s) where your incision(s) will be located unless they are on your face.  Avoid scrubbing your skin too hard.  The important point is to have the CHG soap sit on your skin for 3 minutes.  DO NOT wash with regular  soap after you have used the CHG soap solution.  Pat yourself dry with a clean, freshly laundered towel.  DO NOT apply powders, deodorants or lotions.  Dress in clean, freshly laundered night clothes.  Be sure to sleep with clean, freshly laundered sheets.  Be aware that CHG will cause stains on fabrics; if you wash them with bleach after use. Rinse your washcloth and other linens that have contact with CHG completely. Use only non-chlorine detergents to launder the items used.  The morning of surgery is the fifth day. Repeat the above steps and dress in clean comfortable clothing.      Who should I call if I have any questions regarding the use of CHG soap?  Call the [facility] at [phone] if you have any questions.     Patient Information: Oral/Dental Rinse    What is oral/dental rinse?  It is a mouthwash. It is a way of cleaning the mouth with a germ killing solution before your surgery. The solution contains chlorhexidine, commonly know as CHG.  It is used inside the mouth to kill bacteria known as Staphylococcus aureus.  Let your doctor know if you are allergic to chlorhexidine.    Why do I need to use CHG oral/dental rinse?  The CHG oral/dental rinse helps to kill bacteria in your mouth known as Staphylococcus aureus. This reduces the risk of infection at the surgical site.    Using your CHG oral/dental rinse.  STEPS: use your CHG oral/dental rinse after you brush your teeth the night before (at bedtime) and the morning of your surgery. Follow the directions on your prescription label.  Use the cap on the container to measure 15ml (fill cap to fill line)  Swish (gargle if you can) the mouthwash in your mouth for at least 30 seconds, (do not swallow) spit out.  After you use your CHG rinse, do not rinse your mouth with water, drink or eat. Please refer to prescription label for the appropriate time to resume oral intake.    What side  effects might I have using the CHG oral/dental rinse?  CHG rinse will stick to the plaque on the teeth. Brush and floss just before use. Teeth brushing will help avoid staining of plaque during use.    Who should I contact if I have questions about the CHG oral/dental rinse?  Please call [facility], [facility] at [phone] if you have any questions.     Medication List            Accurate as of February 21, 2024 12:30 PM. Always use your most recent med list.                amLODIPine 5 mg tablet  Commonly known as: Norvasc  Take 1 tablet (5 mg) by mouth once daily.  Medication Adjustments for Surgery: Take morning of surgery with sip of water, no other fluids     aspirin 81 mg EC tablet  Notes to patient: FOLLOW INSTRUCTIONS FROM CARDIOLOGY     atorvastatin 80 mg tablet  Commonly known as: Lipitor  Take 1 tablet (80 mg) by mouth once daily at bedtime.     cholecalciferol 50 MCG (2000 UT) tablet  Commonly known as: Vitamin D-3  Medication Adjustments for Surgery: Stop 7 days before surgery     cyclobenzaprine 10 mg tablet  Commonly known as: Flexeril  Take 1 tablet (10 mg) by mouth 3 times a day as needed for muscle spasms.  Notes to patient: Take as needed.     diclofenac sodium 1 % gel  Commonly known as: Voltaren  Apply 1 Application topically 4 times a day.  Notes to patient: NOT TAKING     docusate sodium 100 mg capsule  Commonly known as: Colace  Take 1 capsule (100 mg) by mouth 2 times a day.  Notes to patient: NOT TAKING     ezetimibe 10 mg tablet  Commonly known as: Zetia  Take 1 tablet (10 mg) by mouth once daily.  Medication Adjustments for Surgery: Stop 1 day before surgery     lisinopril 20 mg tablet  Take 1 tablet (20 mg) by mouth once daily. as directed  Notes to patient: NOT TAKING     losartan 100 mg tablet  Commonly known as: Cozaar  Medication Adjustments for Surgery: Stop 1 day before surgery     metFORMIN 1,000 mg tablet  Commonly  known as: Glucophage  Take 1 tablet (1,000 mg) by mouth 2 times a day.  Notes to patient: Hold dose day of surgery.     methocarbamol 500 mg tablet  Commonly known as: Robaxin  Take 1-2 tabs every 8 hours as needed for spasms  Notes to patient: NOT TAKING     metoprolol tartrate 50 mg tablet  Commonly known as: Lopressor  Take 1 tablet (50 mg) by mouth 2 times a day.  Medication Adjustments for Surgery: Take morning of surgery with sip of water, no other fluids     * Mounjaro 5 mg/0.5 mL pen injector  Generic drug: tirzepatide  Inject 5 mg under the skin 1 (one) time per week.  Notes to patient: NOT TAKING     * Mounjaro 7.5 mg/0.5 mL pen injector  Generic drug: tirzepatide  Inject 7.5 mg under the skin 1 (one) time per week.  Medication Adjustments for Surgery: Stop 7 days before surgery     omega 3-dha-epa-fish oil 360 mg-108 mg- 180 mg-1,200 mg capsule  Medication Adjustments for Surgery: Stop 7 days before surgery     pantoprazole 40 mg EC tablet  Commonly known as: ProtoNix  TAKE 1 TABLET BY MOUTH ONCE DAILY  Medication Adjustments for Surgery: Take morning of surgery with sip of water, no other fluids     predniSONE 20 mg tablet  Commonly known as: Deltasone  Take 1 tablet (20 mg) by mouth once daily.  Notes to patient: NOT TAKING     Repatha SureClick 140 mg/mL injection  Generic drug: evolocumab  INJECT 140 MG (1 PEN) UNDER THE SKIN ONCE EVERY 2 WEEKS AS DIRECTED.  Notes to patient: NOT TAKING           * This list has 2 medication(s) that are the same as other medications prescribed for you. Read the directions carefully, and ask your doctor or other care provider to review them with you.

## 2024-02-21 NOTE — CPM/PAT H&P
CPM/PAT Evaluation       Name: Stefanie Garnett (Stefanie Garnett)  /Age: 1955/68 y.o.     In-Person       Chief Complaint: Nontraumatic tear of right supraspinatus tendon,traumatic partial tear of right biceps tendon    HPI  A 68-year-old female with nontraumatic tear of right supraspinatus tendon, traumatic partial tear of right biceps tendon.  History of traumatic right shoulder injury 2023.  She has had progressive radiating right shoulder pain and decreased range of motion since her injury. Pain radiates up into her neck and down her right arm. Symptoms increase with reaching or lifting motions interfering with sleep and ADLs.  Conservative treatments help minimally.  Denies fever, chills, or right upper extremity numbness.  She is scheduled for right shoulder rotator cuff repair, right upper extremity tendon repair/graft.    Past Medical History:   Diagnosis Date    Arm pain     Arm swelling     Asymptomatic menopausal state     Back pain     Back pain     CAD (coronary artery disease)     CAD (coronary artery disease)     Carpal tunnel syndrome     Chest pain     Cough     Diabetes mellitus (CMS/HCC)     Dizziness     Elevated blood sugar     Exposure to COVID-19 virus     Fall     Floaters     GERD (gastroesophageal reflux disease)     High cholesterol     HPV in female     Hx of cardiac cath     Hyperlipidemia     Hypertension     Ingrown toenail     Knee pain     Left arm numbness     Left foot pain     Left leg swelling     Meralgia paraesthetica, left     Moderate persistent atopic asthma without complication     Morbid obesity (CMS/HCC)     Numbness     Obesity     ANGELICA (obstructive sleep apnea)     Pain in arm, unspecified     Arm pain    Pain of lower extremity     Pain of upper extremity     Palpitations     Personal history of other medical treatment 2019    History of screening mammography    Positive TB test     Restrictive lung disease     Shoulder pain     Sleep apnea      Snoring     SOB (shortness of breath)     Tail bone pain     Vaginal Pap smear        Past Surgical History:   Procedure Laterality Date    CARDIAC CATHETERIZATION      HYSTERECTOMY  07/30/2015    Hysterectomy    HYSTERECTOMY  09/04/2015    Hysterectomy    TUBAL LIGATION  07/30/2015    Tubal Ligation    TUBAL LIGATION  09/04/2015    Tubal Ligation         No Known Allergies    Current Outpatient Medications   Medication Sig Dispense Refill    amLODIPine (Norvasc) 5 mg tablet Take 1 tablet (5 mg) by mouth once daily. 90 tablet 0    aspirin 81 mg EC tablet Take 1 tablet (81 mg) by mouth once daily.      atorvastatin (Lipitor) 80 mg tablet Take 1 tablet (80 mg) by mouth once daily at bedtime. 90 tablet 0    chlorhexidine (Peridex) 0.12 % solution Use cap to measure 15 mL.  Swish/gargle mouthwash for at least 30 seconds.  Do not swallow.  Use night before surgery after brushing teeth and morning of surgery after brushing teeth. 473 mL 0    cholecalciferol (Vitamin D-3) 50 MCG (2000 UT) tablet Take 1 tablet (2,000 Units) by mouth once daily.      cyclobenzaprine (Flexeril) 10 mg tablet Take 1 tablet (10 mg) by mouth 3 times a day as needed for muscle spasms. 30 tablet 0    diclofenac sodium (Voltaren) 1 % gel gel Apply 1 Application topically 4 times a day. (Patient not taking: Reported on 2/21/2024) 100 g 1    docusate sodium (Colace) 100 mg capsule Take 1 capsule (100 mg) by mouth 2 times a day. (Patient not taking: Reported on 2/21/2024) 60 capsule 0    evolocumab (Repatha SureClick) 140 mg/mL injection INJECT 140 MG (1 PEN) UNDER THE SKIN ONCE EVERY 2 WEEKS AS DIRECTED. (Patient not taking: Reported on 2/21/2024) 6 mL 6    ezetimibe (Zetia) 10 mg tablet Take 1 tablet (10 mg) by mouth once daily. 90 tablet 0    lisinopril 20 mg tablet Take 1 tablet (20 mg) by mouth once daily. as directed (Patient not taking: Reported on 2/21/2024) 90 tablet 0    losartan (Cozaar) 100 mg tablet Take 1 tablet (100 mg) by mouth once  daily.      metFORMIN (Glucophage) 1,000 mg tablet Take 1 tablet (1,000 mg) by mouth 2 times a day. (Patient not taking: Reported on 2/21/2024) 180 tablet 0    methocarbamol (Robaxin) 500 mg tablet Take 1-2 tabs every 8 hours as needed for spasms (Patient not taking: Reported on 2/21/2024) 60 tablet 2    metoprolol tartrate (Lopressor) 50 mg tablet Take 1 tablet (50 mg) by mouth 2 times a day. 180 tablet 0    omega 3-dha-epa-fish oil 360 mg-108 mg- 180 mg-1,200 mg capsule Take 1 capsule by mouth once daily.      pantoprazole (ProtoNix) 40 mg EC tablet TAKE 1 TABLET BY MOUTH ONCE DAILY 90 tablet 0    predniSONE (Deltasone) 20 mg tablet Take 1 tablet (20 mg) by mouth once daily. (Patient not taking: Reported on 2/21/2024) 7 tablet 0    tirzepatide (Mounjaro) 5 mg/0.5 mL pen injector Inject 5 mg under the skin 1 (one) time per week. (Patient not taking: Reported on 2/21/2024) 2 mL 0    tirzepatide (Mounjaro) 7.5 mg/0.5 mL pen injector Inject 7.5 mg under the skin 1 (one) time per week. 3 mL 0     No current facility-administered medications for this visit.       Review of Systems   Constitutional:  Positive for activity change.   HENT: Negative.     Eyes: Negative.    Respiratory: Negative.     Cardiovascular: Negative.    Gastrointestinal: Negative.    Genitourinary: Negative.    Musculoskeletal: Negative.    Skin: Negative.    Psychiatric/Behavioral: Negative.          Physical Exam  Vitals reviewed.   HENT:      Head: Normocephalic.      Mouth/Throat:      Mouth: Mucous membranes are moist.   Eyes:      Pupils: Pupils are equal, round, and reactive to light.   Cardiovascular:      Rate and Rhythm: Normal rate and regular rhythm.   Pulmonary:      Effort: Pulmonary effort is normal.      Breath sounds: Normal breath sounds.   Abdominal:      Palpations: Abdomen is soft.   Musculoskeletal:         General: Normal range of motion.      Cervical back: Normal range of motion.      Comments: Right shoulder pain, decreased  "rom   Skin:     General: Skin is warm and dry.   Neurological:      Mental Status: She is alert and oriented to person, place, and time.   Psychiatric:         Mood and Affect: Mood normal.          PAT AIRWAY:   Airway:     Mallampati::  II    Neck ROM::  Full  normal        /63   Pulse 94   Temp 36.1 °C (97 °F) (Temporal)   Resp 16   Ht 1.626 m (5' 4\")   Wt 104 kg (230 lb)   SpO2 100%   BMI 39.48 kg/m²       Stop Bang Score 5   CHADS 2 score: 4.0%  DASI score: 24.2  METS score: 5.7  Revised cardiac risk index: 0.9%  ASA II  ARISCAT 1.6%  EKG obtained in PAT preliminary reading normal sinus rhythm, nonspecific T wave abnormality awaiting confirmation read by cardiologist  Labs done 1/20/2024 CBC, CMP  Cardiologist appointment scheduled 2/22/2024  Echocardiogram 6/28/2022 LVEF 60-65%  Stress test   Assessment and Plan:   Nontraumatic tear of right supraspinatus tendon,traumatic partial tear of right biceps tendon Plan: right shoulder rotator cuff repair, right upper extremity tendon repair/graft.  CAD history of NSTEMI 2015 status post cardiac cath no interventions per record coronary spasm  Diabetes hemoglobin A1c 5.8 on 1/20/2024  Hypertension  ANGELICA uses CPAP  BMI 39.4            "

## 2024-02-22 ENCOUNTER — OFFICE VISIT (OUTPATIENT)
Dept: PRIMARY CARE | Facility: CLINIC | Age: 69
End: 2024-02-22
Payer: COMMERCIAL

## 2024-02-22 ENCOUNTER — OFFICE VISIT (OUTPATIENT)
Dept: CARDIOLOGY | Facility: CLINIC | Age: 69
End: 2024-02-22
Payer: COMMERCIAL

## 2024-02-22 VITALS
DIASTOLIC BLOOD PRESSURE: 85 MMHG | WEIGHT: 231.3 LBS | BODY MASS INDEX: 39.49 KG/M2 | RESPIRATION RATE: 16 BRPM | SYSTOLIC BLOOD PRESSURE: 129 MMHG | OXYGEN SATURATION: 95 % | HEART RATE: 83 BPM | HEIGHT: 64 IN

## 2024-02-22 VITALS
SYSTOLIC BLOOD PRESSURE: 116 MMHG | BODY MASS INDEX: 39.44 KG/M2 | HEIGHT: 64 IN | WEIGHT: 231 LBS | DIASTOLIC BLOOD PRESSURE: 64 MMHG

## 2024-02-22 DIAGNOSIS — E78.5 HYPERLIPIDEMIA, UNSPECIFIED HYPERLIPIDEMIA TYPE: Primary | ICD-10-CM

## 2024-02-22 DIAGNOSIS — R74.8 ELEVATED LIVER ENZYMES: ICD-10-CM

## 2024-02-22 DIAGNOSIS — R73.9 ELEVATED BLOOD SUGAR: ICD-10-CM

## 2024-02-22 DIAGNOSIS — E11.9 TYPE 2 DIABETES MELLITUS WITHOUT COMPLICATION, WITHOUT LONG-TERM CURRENT USE OF INSULIN (MULTI): ICD-10-CM

## 2024-02-22 LAB
ATRIAL RATE: 95 BPM
P AXIS: 49 DEGREES
P OFFSET: 182 MS
P ONSET: 133 MS
PR INTERVAL: 182 MS
Q ONSET: 224 MS
QRS COUNT: 15 BEATS
QRS DURATION: 70 MS
QT INTERVAL: 356 MS
QTC CALCULATION(BAZETT): 447 MS
QTC FREDERICIA: 414 MS
R AXIS: 19 DEGREES
T AXIS: 25 DEGREES
T OFFSET: 402 MS
VENTRICULAR RATE: 95 BPM

## 2024-02-22 PROCEDURE — 3044F HG A1C LEVEL LT 7.0%: CPT | Performed by: INTERNAL MEDICINE

## 2024-02-22 PROCEDURE — 3078F DIAST BP <80 MM HG: CPT | Performed by: INTERNAL MEDICINE

## 2024-02-22 PROCEDURE — 1159F MED LIST DOCD IN RCRD: CPT | Performed by: INTERNAL MEDICINE

## 2024-02-22 PROCEDURE — 1036F TOBACCO NON-USER: CPT | Performed by: INTERNAL MEDICINE

## 2024-02-22 PROCEDURE — 1160F RVW MEDS BY RX/DR IN RCRD: CPT | Performed by: NURSE PRACTITIONER

## 2024-02-22 PROCEDURE — 3074F SYST BP LT 130 MM HG: CPT | Performed by: INTERNAL MEDICINE

## 2024-02-22 PROCEDURE — 3044F HG A1C LEVEL LT 7.0%: CPT | Performed by: NURSE PRACTITIONER

## 2024-02-22 PROCEDURE — 3048F LDL-C <100 MG/DL: CPT | Performed by: NURSE PRACTITIONER

## 2024-02-22 PROCEDURE — 1125F AMNT PAIN NOTED PAIN PRSNT: CPT | Performed by: NURSE PRACTITIONER

## 2024-02-22 PROCEDURE — 1159F MED LIST DOCD IN RCRD: CPT | Performed by: NURSE PRACTITIONER

## 2024-02-22 PROCEDURE — 4010F ACE/ARB THERAPY RXD/TAKEN: CPT | Performed by: INTERNAL MEDICINE

## 2024-02-22 PROCEDURE — 3048F LDL-C <100 MG/DL: CPT | Performed by: INTERNAL MEDICINE

## 2024-02-22 PROCEDURE — 99214 OFFICE O/P EST MOD 30 MIN: CPT | Performed by: NURSE PRACTITIONER

## 2024-02-22 PROCEDURE — 1160F RVW MEDS BY RX/DR IN RCRD: CPT | Performed by: INTERNAL MEDICINE

## 2024-02-22 PROCEDURE — 1036F TOBACCO NON-USER: CPT | Performed by: NURSE PRACTITIONER

## 2024-02-22 PROCEDURE — 3074F SYST BP LT 130 MM HG: CPT | Performed by: NURSE PRACTITIONER

## 2024-02-22 PROCEDURE — 1125F AMNT PAIN NOTED PAIN PRSNT: CPT | Performed by: INTERNAL MEDICINE

## 2024-02-22 PROCEDURE — 4010F ACE/ARB THERAPY RXD/TAKEN: CPT | Performed by: NURSE PRACTITIONER

## 2024-02-22 PROCEDURE — 3079F DIAST BP 80-89 MM HG: CPT | Performed by: NURSE PRACTITIONER

## 2024-02-22 PROCEDURE — 99213 OFFICE O/P EST LOW 20 MIN: CPT | Performed by: INTERNAL MEDICINE

## 2024-02-22 RX ORDER — EVOLOCUMAB 140 MG/ML
140 INJECTION, SOLUTION SUBCUTANEOUS
Qty: 2 ML | Refills: 11 | Status: SHIPPED | OUTPATIENT
Start: 2024-02-22 | End: 2025-02-21

## 2024-02-22 RX ORDER — TIRZEPATIDE 10 MG/.5ML
10 INJECTION, SOLUTION SUBCUTANEOUS
Qty: 3 ML | Refills: 0 | Status: SHIPPED | OUTPATIENT
Start: 2024-02-22 | End: 2024-05-28 | Stop reason: SDUPTHER

## 2024-02-22 ASSESSMENT — ENCOUNTER SYMPTOMS
CONSTITUTIONAL NEGATIVE: 1
NEUROLOGICAL NEGATIVE: 1
CARDIOVASCULAR NEGATIVE: 1
GASTROINTESTINAL NEGATIVE: 1
MUSCULOSKELETAL NEGATIVE: 1
RESPIRATORY NEGATIVE: 1

## 2024-02-22 ASSESSMENT — PATIENT HEALTH QUESTIONNAIRE - PHQ9
1. LITTLE INTEREST OR PLEASURE IN DOING THINGS: NOT AT ALL
SUM OF ALL RESPONSES TO PHQ9 QUESTIONS 1 AND 2: 0
2. FEELING DOWN, DEPRESSED OR HOPELESS: NOT AT ALL

## 2024-02-22 NOTE — PROGRESS NOTES
"Chief Complaint:   Follow-up    History Of Present Illness:    .Ms Blayne Garnett returns in follow up.  Denies chest pain, sob, palpitations or pedal edema.  She fell and needs rotator cuff surgery next month.  She had an echo and treadmill stress test in 2022 and is cleared for surgery.  OK to hold asa per  instructions.         Last Recorded Vitals:  Blood pressure 129/85, pulse 83, resp. rate 16, height 1.626 m (5' 4\"), weight 105 kg (231 lb 4.8 oz), SpO2 95 %.     Past Medical History:  Past Medical History:   Diagnosis Date    Arm pain     Arm swelling     Asymptomatic menopausal state     Back pain     Back pain     CAD (coronary artery disease)     CAD (coronary artery disease)     Carpal tunnel syndrome     Chest pain     Cough     Diabetes mellitus (CMS/HCC)     Dizziness     Elevated blood sugar     Exposure to COVID-19 virus     Fall     Floaters     GERD (gastroesophageal reflux disease)     High cholesterol     HPV in female     Hx of cardiac cath     Hyperlipidemia     Hypertension     Ingrown toenail     Knee pain     Left arm numbness     Left foot pain     Left leg swelling     Meralgia paraesthetica, left     Moderate persistent atopic asthma without complication     Morbid obesity (CMS/HCC)     Numbness     Obesity     ANGELICA (obstructive sleep apnea)     Pain in arm, unspecified     Arm pain    Pain of lower extremity     Pain of upper extremity     Palpitations     Personal history of other medical treatment 04/03/2019    History of screening mammography    Positive TB test     Restrictive lung disease     Shoulder pain     Sleep apnea     Snoring     SOB (shortness of breath)     Tail bone pain     Vaginal Pap smear         Past Surgical History:  Past Surgical History:   Procedure Laterality Date    CARDIAC CATHETERIZATION      HYSTERECTOMY  07/30/2015    Hysterectomy    HYSTERECTOMY  09/04/2015    Hysterectomy    TUBAL LIGATION  07/30/2015    Tubal Ligation    TUBAL LIGATION  09/04/2015 "    Tubal Ligation       Social History:  Social History     Socioeconomic History    Marital status:      Spouse name: None    Number of children: None    Years of education: None    Highest education level: None   Occupational History    None   Tobacco Use    Smoking status: Never     Passive exposure: Never    Smokeless tobacco: Never   Substance and Sexual Activity    Alcohol use: Not Currently     Comment: OCCASIONAL WINE    Drug use: Never    Sexual activity: Defer   Other Topics Concern    None   Social History Narrative    None     Social Determinants of Health     Financial Resource Strain: Not on file   Food Insecurity: Not on file   Transportation Needs: Not on file   Physical Activity: Not on file   Stress: Not on file   Social Connections: Not on file   Intimate Partner Violence: Not on file   Housing Stability: Not on file       Family History:  Family History   Problem Relation Name Age of Onset    Other (malignant neoplasm of urnary bladder) Mother      Other (cardiac disorder) Other      Other (diabetes mellitus) Other           Allergies:  Patient has no known allergies.    Outpatient Medications:  Current Outpatient Medications   Medication Sig Dispense Refill    amLODIPine (Norvasc) 5 mg tablet Take 1 tablet (5 mg) by mouth once daily. 90 tablet 0    aspirin 81 mg EC tablet Take 1 tablet (81 mg) by mouth once daily.      atorvastatin (Lipitor) 80 mg tablet Take 1 tablet (80 mg) by mouth once daily at bedtime. 90 tablet 0    cholecalciferol (Vitamin D-3) 50 MCG (2000 UT) tablet Take 1 tablet (2,000 Units) by mouth once daily.      ezetimibe (Zetia) 10 mg tablet Take 1 tablet (10 mg) by mouth once daily. 90 tablet 0    losartan (Cozaar) 100 mg tablet Take 1 tablet (100 mg) by mouth once daily.      metoprolol tartrate (Lopressor) 50 mg tablet Take 1 tablet (50 mg) by mouth 2 times a day. 180 tablet 0    omega 3-dha-epa-fish oil 360 mg-108 mg- 180 mg-1,200 mg capsule Take 1 capsule by mouth  once daily.      pantoprazole (ProtoNix) 40 mg EC tablet TAKE 1 TABLET BY MOUTH ONCE DAILY 90 tablet 0    tirzepatide (Mounjaro) 7.5 mg/0.5 mL pen injector Inject 7.5 mg under the skin 1 (one) time per week. 3 mL 0     No current facility-administered medications for this visit.        Physical Exam:  Cardiovascular:      PMI at left midclavicular line. Normal rate. Regular rhythm. Normal S1. Normal S2.       Murmurs: There is no murmur.      No gallop.  No click. No rub.   Pulses:     Intact distal pulses.   Edema:     Peripheral edema absent.         ROS:  Review of Systems   Constitutional: Negative.   Cardiovascular: Negative.    Respiratory: Negative.     Skin: Negative.    Musculoskeletal: Negative.    Gastrointestinal: Negative.    Genitourinary: Negative.    Neurological: Negative.           Last Labs:  CBC -  Lab Results   Component Value Date    WBC 10.5 01/20/2024    HGB 14.3 01/20/2024    HCT 43.3 01/20/2024    MCV 80 01/20/2024     01/20/2024       CMP -  Lab Results   Component Value Date    CALCIUM 9.9 01/20/2024    PROT 6.7 01/20/2024    ALBUMIN 4.3 01/20/2024    AST 21 01/20/2024    ALT 56 (H) 01/20/2024    ALKPHOS 76 01/20/2024    BILITOT 0.8 01/20/2024       LIPID PANEL -   Lab Results   Component Value Date    CHOL 142 01/20/2024    TRIG 59 01/20/2024    HDL 49.4 01/20/2024    CHHDL 2.9 01/20/2024    LDLF 84 09/23/2023    VLDL 12 01/20/2024    NHDL 93 01/20/2024       RENAL FUNCTION PANEL -   Lab Results   Component Value Date    GLUCOSE 75 01/20/2024     01/20/2024    K 4.2 01/20/2024     01/20/2024    CO2 31 01/20/2024    ANIONGAP 12 01/20/2024    BUN 17 01/20/2024    CREATININE 0.86 01/20/2024    CALCIUM 9.9 01/20/2024    ALBUMIN 4.3 01/20/2024        Lab Results   Component Value Date    HGBA1C 5.8 (H) 01/20/2024         Assessment/Plan   Problem List Items Addressed This Visit    None    Impressions     1. CAD. Had NSTEMI August 2015. Catheter showed no definite high-grade  culprit lesion that would necessitate intervention. There was some moderate disease in the distal aspect of the first marginal branch of the LCx, but otherwise the patient had very minimal CAD. Echocardiogram done June 2017 showed an EF of 60-65%. Patient had repeat exercise nuclear stress test June 2017 which was negative for ischemia. Had repeat cardiac cath done 10/2019 in the setting of suspected NSTEMI which again showed minimal CAD. Repeat echo, 10/2019 showed EF 60-64%. 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 Patient had follow up echo 06/2022 which showed EF 60-65%.      2. Hypertension. Low readings on lisinoprilâ€“HCT 20â€“25 milligrams daily as well as metoprolol or tartrate 50 mg twice daily. Will stop lisinopril-hct in favor of simple lisinopril 20 mg daily. Due to nagging cough with order losartan 100 mg daily.      3. Hyperlipidemia. Last fasting lipid panel done 09/2022 shows cholesterol 140, HDL 41, LDL 76, triglyceride 112. Continue atorvastatin 80 mg daily as well as Zetia 10 mg daily. She has been compliant. Will look into Repatha.     4. Obesity. Weight loss was encouraged.     5. Sleep apnea. On CPAP.     6. Carotid ultrasound. No hemodynamically significant stenosis when done June 2017.     7. Hx of covid-19 vaccine #1 and #2. Had covid-19 12/2021.          Latanya Severino, DARREN-CNP

## 2024-02-23 RX ORDER — CHLORHEXIDINE GLUCONATE ORAL RINSE 1.2 MG/ML
SOLUTION DENTAL
Qty: 473 ML | Refills: 0 | Status: SHIPPED | OUTPATIENT
Start: 2024-03-05 | End: 2024-03-06

## 2024-02-24 LAB — STAPHYLOCOCCUS SPEC CULT: NORMAL

## 2024-03-01 ENCOUNTER — TELEPHONE (OUTPATIENT)
Dept: ORTHOPEDIC SURGERY | Facility: HOSPITAL | Age: 69
End: 2024-03-01
Payer: COMMERCIAL

## 2024-03-04 ENCOUNTER — ANESTHESIA EVENT (OUTPATIENT)
Dept: OPERATING ROOM | Facility: HOSPITAL | Age: 69
End: 2024-03-04
Payer: COMMERCIAL

## 2024-03-06 ENCOUNTER — HOSPITAL ENCOUNTER (OUTPATIENT)
Facility: HOSPITAL | Age: 69
Setting detail: OUTPATIENT SURGERY
Discharge: HOME | End: 2024-03-06
Attending: ORTHOPAEDIC SURGERY | Admitting: ORTHOPAEDIC SURGERY
Payer: COMMERCIAL

## 2024-03-06 ENCOUNTER — ANESTHESIA (OUTPATIENT)
Dept: OPERATING ROOM | Facility: HOSPITAL | Age: 69
End: 2024-03-06
Payer: COMMERCIAL

## 2024-03-06 VITALS
HEIGHT: 64 IN | SYSTOLIC BLOOD PRESSURE: 138 MMHG | DIASTOLIC BLOOD PRESSURE: 68 MMHG | OXYGEN SATURATION: 99 % | RESPIRATION RATE: 20 BRPM | HEART RATE: 58 BPM | TEMPERATURE: 96.8 F | WEIGHT: 228.62 LBS | BODY MASS INDEX: 39.03 KG/M2

## 2024-03-06 DIAGNOSIS — S46.211A TRAUMATIC PARTIAL TEAR OF BICEPS TENDON, RIGHT, INITIAL ENCOUNTER: ICD-10-CM

## 2024-03-06 DIAGNOSIS — M75.101 NONTRAUMATIC TEAR OF RIGHT SUPRASPINATUS TENDON: ICD-10-CM

## 2024-03-06 DIAGNOSIS — Z01.818 PREOP TESTING: ICD-10-CM

## 2024-03-06 DIAGNOSIS — S46.011A TRAUMATIC TEAR OF RIGHT ROTATOR CUFF, UNSPECIFIED TEAR EXTENT, INITIAL ENCOUNTER: Primary | ICD-10-CM

## 2024-03-06 LAB — GLUCOSE BLD MANUAL STRIP-MCNC: 90 MG/DL (ref 74–99)

## 2024-03-06 PROCEDURE — 82947 ASSAY GLUCOSE BLOOD QUANT: CPT

## 2024-03-06 PROCEDURE — 2500000004 HC RX 250 GENERAL PHARMACY W/ HCPCS (ALT 636 FOR OP/ED): Performed by: PHYSICIAN ASSISTANT

## 2024-03-06 PROCEDURE — 3700000002 HC GENERAL ANESTHESIA TIME - EACH INCREMENTAL 1 MINUTE: Performed by: ORTHOPAEDIC SURGERY

## 2024-03-06 PROCEDURE — A29827 PR SHLDR ARTHROSCOP,SURG,W/ROTAT CUFF REPR: Performed by: ANESTHESIOLOGY

## 2024-03-06 PROCEDURE — 7100000002 HC RECOVERY ROOM TIME - EACH INCREMENTAL 1 MINUTE: Performed by: ORTHOPAEDIC SURGERY

## 2024-03-06 PROCEDURE — 29826 SHO ARTHRS SRG DECOMPRESSION: CPT | Performed by: ORTHOPAEDIC SURGERY

## 2024-03-06 PROCEDURE — 2500000004 HC RX 250 GENERAL PHARMACY W/ HCPCS (ALT 636 FOR OP/ED): Performed by: ANESTHESIOLOGIST ASSISTANT

## 2024-03-06 PROCEDURE — 3600000004 HC OR TIME - INITIAL BASE CHARGE - PROCEDURE LEVEL FOUR: Performed by: ORTHOPAEDIC SURGERY

## 2024-03-06 PROCEDURE — 3700000001 HC GENERAL ANESTHESIA TIME - INITIAL BASE CHARGE: Performed by: ORTHOPAEDIC SURGERY

## 2024-03-06 PROCEDURE — A4649 SURGICAL SUPPLIES: HCPCS | Performed by: ORTHOPAEDIC SURGERY

## 2024-03-06 PROCEDURE — 23430 REPAIR BICEPS TENDON: CPT | Performed by: ORTHOPAEDIC SURGERY

## 2024-03-06 PROCEDURE — 29827 SHO ARTHRS SRG RT8TR CUF RPR: CPT | Performed by: PHYSICIAN ASSISTANT

## 2024-03-06 PROCEDURE — 3600000009 HC OR TIME - EACH INCREMENTAL 1 MINUTE - PROCEDURE LEVEL FOUR: Performed by: ORTHOPAEDIC SURGERY

## 2024-03-06 PROCEDURE — A4217 STERILE WATER/SALINE, 500 ML: HCPCS | Performed by: ORTHOPAEDIC SURGERY

## 2024-03-06 PROCEDURE — 2500000004 HC RX 250 GENERAL PHARMACY W/ HCPCS (ALT 636 FOR OP/ED): Performed by: ORTHOPAEDIC SURGERY

## 2024-03-06 PROCEDURE — A29827 PR SHLDR ARTHROSCOP,SURG,W/ROTAT CUFF REPR: Performed by: ANESTHESIOLOGIST ASSISTANT

## 2024-03-06 PROCEDURE — 29823 SHO ARTHRS SRG XTNSV DBRDMT: CPT | Performed by: ORTHOPAEDIC SURGERY

## 2024-03-06 PROCEDURE — 7100000001 HC RECOVERY ROOM TIME - INITIAL BASE CHARGE: Performed by: ORTHOPAEDIC SURGERY

## 2024-03-06 PROCEDURE — 29823 SHO ARTHRS SRG XTNSV DBRDMT: CPT | Performed by: PHYSICIAN ASSISTANT

## 2024-03-06 PROCEDURE — 29826 SHO ARTHRS SRG DECOMPRESSION: CPT | Performed by: PHYSICIAN ASSISTANT

## 2024-03-06 PROCEDURE — 2720000007 HC OR 272 NO HCPCS: Performed by: ORTHOPAEDIC SURGERY

## 2024-03-06 PROCEDURE — 2500000004 HC RX 250 GENERAL PHARMACY W/ HCPCS (ALT 636 FOR OP/ED): Performed by: ANESTHESIOLOGY

## 2024-03-06 PROCEDURE — 2500000001 HC RX 250 WO HCPCS SELF ADMINISTERED DRUGS (ALT 637 FOR MEDICARE OP): Performed by: ORTHOPAEDIC SURGERY

## 2024-03-06 PROCEDURE — 2780000003 HC OR 278 NO HCPCS: Performed by: ORTHOPAEDIC SURGERY

## 2024-03-06 PROCEDURE — 7100000009 HC PHASE TWO TIME - INITIAL BASE CHARGE: Performed by: ORTHOPAEDIC SURGERY

## 2024-03-06 PROCEDURE — 64415 NJX AA&/STRD BRCH PLXS IMG: CPT | Performed by: ANESTHESIOLOGY

## 2024-03-06 PROCEDURE — 29827 SHO ARTHRS SRG RT8TR CUF RPR: CPT | Performed by: ORTHOPAEDIC SURGERY

## 2024-03-06 PROCEDURE — 2500000005 HC RX 250 GENERAL PHARMACY W/O HCPCS: Performed by: ANESTHESIOLOGIST ASSISTANT

## 2024-03-06 PROCEDURE — 23430 REPAIR BICEPS TENDON: CPT | Performed by: PHYSICIAN ASSISTANT

## 2024-03-06 PROCEDURE — C1713 ANCHOR/SCREW BN/BN,TIS/BN: HCPCS | Performed by: ORTHOPAEDIC SURGERY

## 2024-03-06 PROCEDURE — 7100000010 HC PHASE TWO TIME - EACH INCREMENTAL 1 MINUTE: Performed by: ORTHOPAEDIC SURGERY

## 2024-03-06 DEVICE — PROXIMAL TENODESIS IMPLANT SYSTEM REV: 0
Type: IMPLANTABLE DEVICE | Site: ARM | Status: FUNCTIONAL
Brand: ARTHREX®

## 2024-03-06 DEVICE — IMPLANTABLE DEVICE: Type: IMPLANTABLE DEVICE | Site: ARM | Status: FUNCTIONAL

## 2024-03-06 RX ORDER — MIDAZOLAM HYDROCHLORIDE 1 MG/ML
2 INJECTION, SOLUTION INTRAMUSCULAR; INTRAVENOUS ONCE
Status: COMPLETED | OUTPATIENT
Start: 2024-03-06 | End: 2024-03-06

## 2024-03-06 RX ORDER — LABETALOL HYDROCHLORIDE 5 MG/ML
5 INJECTION, SOLUTION INTRAVENOUS ONCE AS NEEDED
Status: DISCONTINUED | OUTPATIENT
Start: 2024-03-06 | End: 2024-03-06 | Stop reason: HOSPADM

## 2024-03-06 RX ORDER — OXYCODONE AND ACETAMINOPHEN 5; 325 MG/1; MG/1
1 TABLET ORAL EVERY 6 HOURS PRN
Qty: 28 TABLET | Refills: 0 | Status: SHIPPED | OUTPATIENT
Start: 2024-03-06 | End: 2024-03-13

## 2024-03-06 RX ORDER — CEFAZOLIN SODIUM 2 G/100ML
2 INJECTION, SOLUTION INTRAVENOUS ONCE
Status: COMPLETED | OUTPATIENT
Start: 2024-03-06 | End: 2024-03-06

## 2024-03-06 RX ORDER — FENTANYL CITRATE 50 UG/ML
INJECTION, SOLUTION INTRAMUSCULAR; INTRAVENOUS AS NEEDED
Status: DISCONTINUED | OUTPATIENT
Start: 2024-03-06 | End: 2024-03-06

## 2024-03-06 RX ORDER — ONDANSETRON HYDROCHLORIDE 2 MG/ML
INJECTION, SOLUTION INTRAVENOUS AS NEEDED
Status: DISCONTINUED | OUTPATIENT
Start: 2024-03-06 | End: 2024-03-06

## 2024-03-06 RX ORDER — BACITRACIN ZINC 500 UNIT/G
OINTMENT IN PACKET (EA) TOPICAL AS NEEDED
Status: DISCONTINUED | OUTPATIENT
Start: 2024-03-06 | End: 2024-03-06 | Stop reason: HOSPADM

## 2024-03-06 RX ORDER — FENTANYL CITRATE 50 UG/ML
25 INJECTION, SOLUTION INTRAMUSCULAR; INTRAVENOUS EVERY 5 MIN PRN
Status: DISCONTINUED | OUTPATIENT
Start: 2024-03-06 | End: 2024-03-06 | Stop reason: HOSPADM

## 2024-03-06 RX ORDER — SODIUM CHLORIDE, SODIUM LACTATE, POTASSIUM CHLORIDE, CALCIUM CHLORIDE 600; 310; 30; 20 MG/100ML; MG/100ML; MG/100ML; MG/100ML
100 INJECTION, SOLUTION INTRAVENOUS CONTINUOUS
Status: DISCONTINUED | OUTPATIENT
Start: 2024-03-06 | End: 2024-03-06 | Stop reason: HOSPADM

## 2024-03-06 RX ORDER — DEXAMETHASONE SODIUM PHOSPHATE 4 MG/ML
INJECTION, SOLUTION INTRA-ARTICULAR; INTRALESIONAL; INTRAMUSCULAR; INTRAVENOUS; SOFT TISSUE AS NEEDED
Status: DISCONTINUED | OUTPATIENT
Start: 2024-03-06 | End: 2024-03-06

## 2024-03-06 RX ORDER — MEPERIDINE HYDROCHLORIDE 25 MG/ML
12.5 INJECTION INTRAMUSCULAR; INTRAVENOUS; SUBCUTANEOUS EVERY 10 MIN PRN
Status: DISCONTINUED | OUTPATIENT
Start: 2024-03-06 | End: 2024-03-06 | Stop reason: HOSPADM

## 2024-03-06 RX ORDER — PROPOFOL 10 MG/ML
INJECTION, EMULSION INTRAVENOUS AS NEEDED
Status: DISCONTINUED | OUTPATIENT
Start: 2024-03-06 | End: 2024-03-06

## 2024-03-06 RX ORDER — LIDOCAINE HYDROCHLORIDE 10 MG/ML
INJECTION, SOLUTION EPIDURAL; INFILTRATION; INTRACAUDAL; PERINEURAL AS NEEDED
Status: DISCONTINUED | OUTPATIENT
Start: 2024-03-06 | End: 2024-03-06

## 2024-03-06 RX ORDER — FENTANYL CITRATE 50 UG/ML
50 INJECTION, SOLUTION INTRAMUSCULAR; INTRAVENOUS EVERY 5 MIN PRN
Status: DISCONTINUED | OUTPATIENT
Start: 2024-03-06 | End: 2024-03-06 | Stop reason: HOSPADM

## 2024-03-06 RX ORDER — ONDANSETRON HYDROCHLORIDE 2 MG/ML
4 INJECTION, SOLUTION INTRAVENOUS ONCE AS NEEDED
Status: DISCONTINUED | OUTPATIENT
Start: 2024-03-06 | End: 2024-03-06 | Stop reason: HOSPADM

## 2024-03-06 RX ORDER — LIDOCAINE HYDROCHLORIDE 10 MG/ML
0.1 INJECTION INFILTRATION; PERINEURAL ONCE
Status: DISCONTINUED | OUTPATIENT
Start: 2024-03-06 | End: 2024-03-06 | Stop reason: HOSPADM

## 2024-03-06 RX ORDER — FENTANYL CITRATE 50 UG/ML
50 INJECTION, SOLUTION INTRAMUSCULAR; INTRAVENOUS ONCE
Status: COMPLETED | OUTPATIENT
Start: 2024-03-06 | End: 2024-03-06

## 2024-03-06 RX ORDER — SODIUM CHLORIDE, SODIUM LACTATE, POTASSIUM CHLORIDE, CALCIUM CHLORIDE 600; 310; 30; 20 MG/100ML; MG/100ML; MG/100ML; MG/100ML
INJECTION, SOLUTION INTRAVENOUS CONTINUOUS PRN
Status: DISCONTINUED | OUTPATIENT
Start: 2024-03-06 | End: 2024-03-06

## 2024-03-06 RX ORDER — OXYCODONE HYDROCHLORIDE 5 MG/1
5 TABLET ORAL EVERY 4 HOURS PRN
Status: DISCONTINUED | OUTPATIENT
Start: 2024-03-06 | End: 2024-03-06 | Stop reason: HOSPADM

## 2024-03-06 RX ADMIN — FENTANYL CITRATE 50 MCG: 50 INJECTION INTRAMUSCULAR; INTRAVENOUS at 10:29

## 2024-03-06 RX ADMIN — LIDOCAINE HYDROCHLORIDE 5 ML: 10 INJECTION, SOLUTION EPIDURAL; INFILTRATION; INTRACAUDAL; PERINEURAL at 10:27

## 2024-03-06 RX ADMIN — SODIUM CHLORIDE, SODIUM LACTATE, POTASSIUM CHLORIDE, AND CALCIUM CHLORIDE 100 ML/HR: 600; 310; 30; 20 INJECTION, SOLUTION INTRAVENOUS at 08:06

## 2024-03-06 RX ADMIN — MIDAZOLAM 2 MG: 1 INJECTION INTRAMUSCULAR; INTRAVENOUS at 08:13

## 2024-03-06 RX ADMIN — PROPOFOL 200 MG: 10 INJECTION, EMULSION INTRAVENOUS at 10:27

## 2024-03-06 RX ADMIN — FENTANYL CITRATE 50 MCG: 50 INJECTION INTRAMUSCULAR; INTRAVENOUS at 08:13

## 2024-03-06 RX ADMIN — PROPOFOL 50 MG: 10 INJECTION, EMULSION INTRAVENOUS at 10:33

## 2024-03-06 RX ADMIN — PROPOFOL 40 MG: 10 INJECTION, EMULSION INTRAVENOUS at 11:54

## 2024-03-06 RX ADMIN — SODIUM CHLORIDE, POTASSIUM CHLORIDE, SODIUM LACTATE AND CALCIUM CHLORIDE: 600; 310; 30; 20 INJECTION, SOLUTION INTRAVENOUS at 10:20

## 2024-03-06 RX ADMIN — DEXAMETHASONE SODIUM PHOSPHATE 4 MG: 4 INJECTION, SOLUTION INTRAMUSCULAR; INTRAVENOUS at 10:49

## 2024-03-06 RX ADMIN — FENTANYL CITRATE 25 MCG: 50 INJECTION INTRAMUSCULAR; INTRAVENOUS at 11:03

## 2024-03-06 RX ADMIN — FENTANYL CITRATE 25 MCG: 50 INJECTION INTRAMUSCULAR; INTRAVENOUS at 11:20

## 2024-03-06 RX ADMIN — ONDANSETRON 4 MG: 2 INJECTION INTRAMUSCULAR; INTRAVENOUS at 11:48

## 2024-03-06 RX ADMIN — PROPOFOL 40 MG: 10 INJECTION, EMULSION INTRAVENOUS at 11:51

## 2024-03-06 RX ADMIN — CEFAZOLIN SODIUM 2 G: 2 INJECTION, SOLUTION INTRAVENOUS at 10:36

## 2024-03-06 ASSESSMENT — PAIN SCALES - GENERAL
PAINLEVEL_OUTOF10: 0 - NO PAIN
PAINLEVEL_OUTOF10: 5 - MODERATE PAIN
PAINLEVEL_OUTOF10: 0 - NO PAIN
PAIN_LEVEL: 0

## 2024-03-06 ASSESSMENT — PAIN - FUNCTIONAL ASSESSMENT
PAIN_FUNCTIONAL_ASSESSMENT: WONG-BAKER FACES
PAIN_FUNCTIONAL_ASSESSMENT: 0-10

## 2024-03-06 ASSESSMENT — COLUMBIA-SUICIDE SEVERITY RATING SCALE - C-SSRS
2. HAVE YOU ACTUALLY HAD ANY THOUGHTS OF KILLING YOURSELF?: NO
1. IN THE PAST MONTH, HAVE YOU WISHED YOU WERE DEAD OR WISHED YOU COULD GO TO SLEEP AND NOT WAKE UP?: NO

## 2024-03-06 NOTE — OP NOTE
Repair Rotator Cuff Shoulder (R), Repair Tendon with or without Tendon Graft Upper Extremity (R) Operative Note     Date: 3/6/2024  OR Location: [facility]    Name: Stefanie Garnett, : 1955, Age: 68 y.o., MRN: [id number], Sex: female    Diagnosis  Pre-op Diagnosis     * Nontraumatic tear of right supraspinatus tendon [M75.101]     * Traumatic partial tear of biceps tendon, right, initial encounter [S46.211A] Post-op Diagnosis     * Nontraumatic tear of right supraspinatus tendon [M75.101]     * Traumatic partial tear of biceps tendon, right, initial encounter [S46.211A]     Procedures  Repair Rotator Cuff Shoulder  31418 - [place] SURGICAL ARTHROSCOPY SHOULDER W/ROTATOR CUFF RPR    Repair Tendon with or without Tendon Graft Upper Extremity  20661 - [place] TENODESIS LONG TENDON BICEPS  #1 right shoulder arthroscopic double row rotator cuff repair with ripstop  #2 right shoulder open biceps tenodesis  #3 right shoulder arthroscopic major joint debridement chondromalacia joint synovitis with labral tear  #4 right shoulder arthroscopic anterior acromioplasty extensive decompression and bursectomy for significant impinging lesion anterior acromion  Surgeons      * Gume Fragoso - Primary    Resident/Fellow/Other Assistant:  Surgeon(s) and Role:  Cindy Malave PA-C  Procedure Summary  Anesthesia: General  ASA: III  Anesthesia Staff: Anesthesiologist: Rudy Cortez MD  C-AA: ESPERANZA Fischer  Estimated Blood Loss: 5mL  Intra-op Medications:   Administrations occurring from 0900 to 1030 on 24:   Medication Name Total Dose   lactated Ringer's infusion 0.17 mL              Anesthesia Record               Intraprocedure I/O Totals       None           Specimen: No specimens collected     Staff:   Circulator: Michelle Benitez RN  Scrub Person: Janell Ramachandran PA-C; Mike Douglass         Drains and/or Catheters: * None in log *    Tourniquet Times:         Implants:  Implants       Type Name  Action Serial No.      Arthroscopy Implant Sports Med Knotless FiberTak Speedbridge Implant System Implanted      Implant KIT, IMPLANT SYSTEM, PROXIMAL TENODESIS - [id number] Implanted               Findings: As above    Indications: Stefanie Garnett is an 68 y.o. female who is having surgery for Nontraumatic tear of right supraspinatus tendon [M75.101]  Traumatic partial tear of biceps tendon, right, initial encounter [S46.211A].  Pleasant patient understands the significant risk such as nerve artery tendon damage infection continued pain possible need for future surgery and infection understands risk completely wishes to proceed understands risk of recurrent tear continued pain need for future surgery such as replacement or revision repair.  Understands risk completely and wishes to proceed understands postoperative usage of the splint and straightening the arm answered all preoperative questions again today, informed consent obtained    The patient was seen in the preoperative area. The risks, benefits, complications, treatment options, non-operative alternatives, expected recovery and outcomes were discussed with the patient. The possibilities of reaction to medication, pulmonary aspiration, injury to surrounding structures, bleeding, recurrent infection, the need for additional procedures, failure to diagnose a condition, and creating a complication requiring transfusion or operation were discussed with the patient. The patient concurred with the proposed plan, giving informed consent.  The site of surgery was properly noted/marked if necessary per policy. The patient has been actively warmed in preoperative area. Preoperative antibiotics have been ordered and given within 1 hours of incision. Venous thrombosis prophylaxis have been ordered including bilateral sequential compression devices    Procedure Details: Wallace patient brought the operating room and after sterilely prepped and draped informed  timeout we placed the patient in left lateral decubitus position well-padded on beanbag with an axillary roll we easily entered the joint immediately saw the large rotator cuff tear but also she had grade II and III chondromalacia of the glenoid but no unstable other pieces we debrided all areas and debrided out the interval of synovitis labral tearing and tenotomized the biceps we debrided the cuff from the articular side.  After the major joint debridement we turned our attention to the subacromial space.  She had a large anterior spur this necessitated an anterior chromium plasty extensive decompression bursectomy.  No significant AC joint symptoms at this point we did a double row suture bridge repair with moderate bone and moderate tissue quality with medial row and lateral anchors.  We did use the ripstop technique to help with suture pullout and strength.  This was a double row suture bridge repair with 2 Medial Row and 2 lateral anchors we brought the ripstop down the brought the medial edge down and then repaired our crisscross sutures laterally.  Both anchors got good purchase.  At this point we debrided out the subacromial space we had excellent repair of the large cuff tear.  At this point we turned our attention and prior we did harvest the biceps tendon before she had too much swelling and brought this out and then did a biceps unicortical tenodesis under appropriate tension.  At this point we copiously irrigated out all wounds portals were closed patient was placed in abduction pillow sling there were no complications she will be followed up in therapy Cindy Malave PA-C acted as surgical assistant during this case and assistance greatly reduced operative time and aided in performance of the case  Complications:  None; patient tolerated the procedure well.    Disposition: PACU - hemodynamically stable.  Condition: stable         Additional Details: 0    Attending Attestation: I performed the  procedure.    Gume Fragoso  Phone Number: [phone]

## 2024-03-06 NOTE — PERIOPERATIVE NURSING NOTE
Pt arousable, apparent purposeful 'floppiness' of head and non operative extremity.  Operative extremity with ice pack and sling in place.  Pt responds when talked to, follows commands. Denies pain or PONV.

## 2024-03-06 NOTE — PERIOPERATIVE NURSING NOTE
Patient in Phase 2; dressed and up to chair with RN assist. Tolerating po fluids, no complaint of pain and no complaint of nausea.     Significant other at bedside; discussed discharge instructions with patient and Significant other. All questions at this time answered.  Educated both on how to use sling.     IV removed and patient doing well. Awaiting to speak with Doctor to see how everything went

## 2024-03-06 NOTE — ANESTHESIA PROCEDURE NOTES
Peripheral Block    Patient location during procedure: pre-op  Start time: 3/6/2024 8:16 AM  End time: 3/6/2024 8:17 AM  Reason for block: at surgeon's request and post-op pain management  Staffing  Performed: attending   Authorized by: Rudy Cortez MD    Performed by: Rudy Cortez MD  Preanesthetic Checklist  Completed: patient identified, IV checked, site marked, risks and benefits discussed, surgical consent, monitors and equipment checked, pre-op evaluation and timeout performed   Timeout performed at: 3/6/2024 8:13 AM  Peripheral Block  Patient position: laying flat  Prep: ChloraPrep  Patient monitoring: heart rate, cardiac monitor and continuous pulse ox  Block type: interscalene  Laterality: right  Injection technique: single-shot  Guidance: ultrasound guided  Local infiltration: ropivacaine  Infiltration strength: 0.5 %  Dose: 20 mL  Needle  Needle type: short-bevel   Needle gauge: 22 G  Needle length: 5 cm  Needle localization: ultrasound guidance  Assessment  Injection assessment: negative aspiration for heme, no paresthesia on injection, incremental injection and local visualized surrounding nerve on ultrasound  Paresthesia pain: none  Heart rate change: no  Slow fractionated injection: yes

## 2024-03-06 NOTE — ANESTHESIA PROCEDURE NOTES
Airway  Date/Time: 3/6/2024 10:27 AM  Urgency: elective      Staffing  Performed: ESPERANZA   Authorized by: Rudy Cortez MD    Performed by: ESPERANZA Fischer  Patient location during procedure: OR    Indications and Patient Condition  Indications for airway management: anesthesia  Spontaneous Ventilation: absent  Sedation level: deep  Preoxygenated: yes      Final Airway Details  Final airway type: supraglottic airway      Successful airway: Size 4     Number of attempts at approach: 1    Additional Comments  Difficult mask.  Pt biting after 200mg propofol.  LMA placed, OA not attempted

## 2024-03-06 NOTE — ANESTHESIA PREPROCEDURE EVALUATION
Patient: Stefanie Cisneros Dhyll    Procedure Information       Date/Time: 03/06/24 0900    Procedures:       Repair Rotator Cuff Shoulder (Right: Arm Upper)      Repair Tendon with or without Tendon Graft Upper Extremity (Right: Arm Upper)    Location: [facility] / [facility]    Surgeons: Gume Fragoso MD            Relevant Problems   Cardiovascular   (+) Arteriosclerosis of coronary artery   (+) Benign hypertension   (+) Chest pain   (+) Chest pain, unspecified   (+) High cholesterol   (+) Hyperlipemia      Endocrine   (+) Morbid obesity (CMS/HCC)   (+) Obesity      GI   (+) GERD (gastroesophageal reflux disease)      Neuro/Psych   (+) Carpal tunnel syndrome   (+) Meralgia paresthetica of left side      Pulmonary   (+) Extrinsic asthma   (+) Moderate persistent atopic asthma without complication   (+) ANGELICA (obstructive sleep apnea)   (+) Restrictive lung disease   (+) SOB (shortness of breath) on exertion      Hematology   (+) Other specified anemias      Musculoskeletal   (+) Carpal tunnel syndrome   (+) Chronic bilateral low back pain without sciatica   (+) Spinal stenosis, lumbar region, without neurogenic claudication      Infectious Disease   (+) HPV in female     EF 60% per echo 2019    Clinical information reviewed:   Tobacco  Allergies    Med Hx  Surg Hx   Fam Hx  Soc Hx        NPO Detail:  NPO/Void Status  Date of Last Liquid: 03/06/24  Time of Last Liquid: 0600  Date of Last Solid: 03/05/24  Time of Last Solid: 1600  Time of Last Void: 0700         Physical Exam    Airway  Mallampati: III  TM distance: >3 FB  Neck ROM: full     Cardiovascular    Dental - normal exam     Pulmonary    Abdominal            Anesthesia Plan    History of general anesthesia?: yes  History of complications of general anesthesia?: no    ASA 3     general and regional     intravenous induction   Postoperative administration of opioids is intended.  Trial extubation is planned.  Anesthetic plan and risks discussed with  patient.    Plan discussed with ESPERANZA.

## 2024-03-06 NOTE — PERIOPERATIVE NURSING NOTE
Patient meets criteria for discharge.  Patient wants to just go home and Iwdianne have surgeon call them to update how everything went.  Patient taken out via wheelchair by RN

## 2024-03-06 NOTE — DISCHARGE INSTRUCTIONS
You had an arthroscopic shoulder surgery today.  A nerve block was done preoperatively to help with pain, this can make the whole arm very numb for 18 to 24 hours postoperatively.  It will slowly begin to wear off, likely starting in the fingers and hand tingling and will slowly work up the arm.      Once the nerve block wears off, allow the elbow to straighten when sitting and laying down, he should have the sling on for all activity and at night.    You should be seen in therapy about 3 days postoperatively, this may have been set up for you or we have discussed where you are going for therapy, if you are having trouble getting into see someone please call the office.    Postoperative dressing can be removed in 3 days or you can wait until therapy to remove it, it is okay to get the wounds wet after 5 days and you can apply new Band-Aids over the sutures so they do not get stuck and pull on clothing.    Home pain medication has been sent to your pharmacy, if able you can supplement with 800 mg of ibuprofen and alternate every 4-6 hours with the prescription pain medication and slowly wean off of this as quickly as possible.     If taking narcotic pain medication please take Colace and Miralax to help with constipation. Any issues with constipation you can call the office or your PCP

## 2024-03-06 NOTE — ANESTHESIA POSTPROCEDURE EVALUATION
36.Patient: Stefanie Garnett    Procedure Summary       Date: 03/06/24 Room / Location: [facility] / [facility]    Anesthesia Start: 1021 Anesthesia Stop: 1204    Procedures:       Repair Rotator Cuff Shoulder (Right: Arm Upper)      Repair Tendon with or without Tendon Graft Upper Extremity (Right: Arm Upper) Diagnosis:       Nontraumatic tear of right supraspinatus tendon      Traumatic partial tear of biceps tendon, right, initial encounter      (Nontraumatic tear of right supraspinatus tendon [M75.101])      (Traumatic partial tear of biceps tendon, right, initial encounter [S46.211A])    Surgeons: Gume Fragoso MD Responsible Provider: ESPERANZA Fischer    Anesthesia Type: general, regional ASA Status: 3            Anesthesia Type: general, regional    Vitals Value Taken Time   /79 03/06/24 1215   Temp 36.2 03/06/24 1308   Pulse 62 03/06/24 1215   Resp 20 03/06/24 1215   SpO2 96 % 03/06/24 1215     Anesthesia Post Evaluation    Patient location during evaluation: PACU  Patient participation: complete - patient participated  Level of consciousness: awake and alert  Pain score: 0  Pain management: satisfactory to patient  Airway patency: patent  Cardiovascular status: hemodynamically stable  Respiratory status: acceptable  Hydration status: acceptable  Postoperative Nausea and Vomiting: none  Comments: PONV absent, blocks appears effective        There were no known notable events for this encounter.     Patient here for cpx  No LMP recorded. Patient is postmenopausal.  Last pap: 12/10/15   History of abnormal pap? n if yes, when. History of menopause? y When / Age?    Mammogram: 12/27/18  Family history breast cancer no  Urinary none  Bowel: 1 per day  Family history bowel cancer: no  Diet: ok  Milk[de-identified] none  Water more than 5 glasses  Caffeine: 2 glasses  Soda :none  Dental: 6 months  Vision exam: 2 yrs  Tetanus: 9/11/15  Exercise:  moderate regular exercise program  Concerns: none  Health Maintenance Due   Topic Date Due   â¢ Shingles Vaccine (1 of 2) 12/01/1999   â¢ Medicare Wellness 65+  12/01/2014   â¢ Influenza Vaccine (1) 08/01/2018 Home

## 2024-03-07 ASSESSMENT — PAIN SCALES - GENERAL: PAINLEVEL_OUTOF10: 10 - WORST POSSIBLE PAIN

## 2024-03-08 ENCOUNTER — TELEPHONE (OUTPATIENT)
Dept: ORTHOPEDIC SURGERY | Facility: CLINIC | Age: 69
End: 2024-03-08
Payer: COMMERCIAL

## 2024-03-08 NOTE — TELEPHONE ENCOUNTER
WHEN CAN SHE TAKE OFF HER BANDAGE?   THE PHARMACY SAID THE NEVER GOT ANYTHING FOR HER A ANTIBIOTIC   [facility]

## 2024-03-08 NOTE — TELEPHONE ENCOUNTER
I did not send in antibiotic to pharmacy just pain medication, she can leave dressing on until therapy

## 2024-03-11 ENCOUNTER — EVALUATION (OUTPATIENT)
Dept: OCCUPATIONAL THERAPY | Facility: CLINIC | Age: 69
End: 2024-03-11
Payer: COMMERCIAL

## 2024-03-11 DIAGNOSIS — M25.511 RIGHT SHOULDER PAIN: Primary | ICD-10-CM

## 2024-03-11 DIAGNOSIS — M75.101 NONTRAUMATIC TEAR OF RIGHT SUPRASPINATUS TENDON: ICD-10-CM

## 2024-03-11 PROCEDURE — 97165 OT EVAL LOW COMPLEX 30 MIN: CPT | Mod: GO

## 2024-03-11 ASSESSMENT — PAIN - FUNCTIONAL ASSESSMENT: PAIN_FUNCTIONAL_ASSESSMENT: 0-10

## 2024-03-11 ASSESSMENT — PAIN SCALES - GENERAL: PAINLEVEL_OUTOF10: 5 - MODERATE PAIN

## 2024-03-11 ASSESSMENT — PAIN DESCRIPTION - DESCRIPTORS: DESCRIPTORS: ACHING;TENDER;TIGHTNESS

## 2024-03-11 NOTE — PROGRESS NOTES
Occupational Therapy    Occupational Therapy Evaluation    Name: Stefanie Garnett  MRN: [id number]  : 1955  Date: 24  Time Calculation  Start Time: 1110  Stop Time: 1140  Time Calculation (min): 30 min  OT Evaluation Time Entry  OT Evaluation (Low) Time Entry: 30,  ,      Assessment:  OT Assessment  OT Assessment Results: Decreased ADL status, Decreased upper extremity range of motion, Decreased upper extremity strength, Decreased IADLs  Plan:  Outpatient Plan  Frequency: 1-2x/week  Duration: 12 weeks  Onset Date: 24  Number of Treatments Authorized: 20  Rehab Potential: Good  Plan of Care Agreement: Patient    Subjective   Current Problem:  1. Right shoulder pain  Follow Up In Occupational Therapy      2. Nontraumatic tear of right supraspinatus tendon  Referral to Physical Therapy    Follow Up In Occupational Therapy        General:      General  Reason for Referral: s/p R RCR  Referred By: Dr. Fragoso  Preferred Learning Style: verbal, visual, written  Precautions:  Precautions  Post-Surgical Precautions: Right shoulder precautions  Braces Applied: Pt arrived to clinic with sling donned    Pain Assessment:  Pain Assessment  Pain Assessment: 0-10  Pain Score: 5 - Moderate pain  Pain Location: Shoulder  Pain Orientation: Right  Pain Descriptors: Aching, Tender, Tightness  Pain Frequency: Intermittent    Objective     Home Living:  Home Living  Lives With: Spouse  Prior Function Per Pt/Caregiver Report:  Prior Function Per Pt/Caregiver Report  Level of [place]: Independent with ADLs and functional transfers  Hand Dominance: Right  IADL History:  Occupation: Part time employment  Type of Occupation:   ADL:  UE Dressing Assistance: Moderate  LE Dressing Assistance: Moderate  Activity Tolerance:     Modalities:     Sensation:  Light Touch: No apparent deficits  Strength:  Strength Comments: not assessed  Perception:     Coordination:  Movements are Fluid and Coordinated: Yes  Hand  Function:  Gross Grasp: Functional  Coordination: Functional  Extremities: RUE PROM (degrees)  R Shoulder Flexion  0-170: 90 Degrees  R Shoulder Abduction 0-160/180: 70 Degrees  R Shoulder Internal Rotation  0-70:  (to body)  R Shoulder External Rotation  0-90: 30 Degrees  R Elbow Flexion/Extension 0-135-150:  (0-135)  R Forearm Pronation  0-80-90: 80 Degrees  R Forearm Supination  0-80-90: 80 Degrees  R Wrist Flexion 0-80: 80 Degrees  R Wrist Extension 0-70: 70 Degrees    AROM and strength not assessed due to precautions   Outcome Measures:  UEFI: 3/80    OP EDUCATION:  Education  Individual(s) Educated: Patient  Education Provided: Anatomy & Physiology, Diagnosis & Precautions, Wound care  Home Program: PROM, Activity modification  Diagnosis and Precautions: RCR precautions  Risk and Benefits Discussed with Patient/Caregiver/Other: yes  Patient/Caregiver Demonstrated Understanding: yes  Plan of Care Discussed and Agreed Upon: yes  Patient Response to Education: Patient/Caregiver Verbalized Understanding of Information, Patient/Caregiver Performed Return Demonstration of Exercises/Activities    Goals:  Active       OT Problem       PATIENT WILL DEMONSTRATE INDEPENDENCE WITH UPPER BODY donning and doffing all UE clothes       Start:  03/11/24    Expected End:  04/15/24            PATIENT WILL DEMONSTRATE INDEPENDENCE WITH LOWER BODY donning and doffing all LE clothes        Start:  03/11/24    Expected End:  04/15/24            PATIENT WILL  style HAIR WITH independent       Start:  03/11/24    Expected End:  05/06/24            PATIENT WILL ACHIEVE right SHOULDER FLEXION STRENGTH OF 4/5       Start:  03/11/24    Expected End:  06/03/24            PATIENT WILL ACHIEVE right SHOULDER ABDUCTION STRENGTH OF 4/5       Start:  03/11/24    Expected End:  06/03/24            PATIENT WILL ACHIEVE right SHOULDER EXTERNAL ROTATION STRENGTH OF 4/5 IN neutral       Start:  03/11/24    Expected End:  06/03/24            PATIENT  WILL ACHIEVE right SHOULDER FLEXION  DEGREES       Start:  03/11/24    Expected End:  05/20/24            PATIENT WILL ACHIEVE right SHOULDER ABDUCTION  DEGREES       Start:  03/11/24    Expected End:  05/20/24            PATIENT WILL ACHIEVE right SHOULDER EXTERNAL ROTATION OF 80 DEGREES IN 90 degrees abd       Start:  03/11/24    Expected End:  05/20/24            PATIENT WILL ACHIEVE right SHOULDER INTERNAL ROTATION TO L5 to improve upper body dressing and bathing        Start:  03/11/24    Expected End:  05/20/24            PATIENT WILL SHOW A SIGNIFICANT CHANGE IN UEFI PATIENT REPORTED OUTCOME TOOL TO DEMONSTRATE SUBJECTIVE IMPROVEMENT       Start:  03/11/24    Expected End:  06/03/24            PATIENT WILL REPORT PAIN OF 0-1/10 DEMONSTRATING A REDUCTION OF OVERALL PAIN       Start:  03/11/24    Expected End:  05/20/24            PATIENT WILL DEMONSTRATE INDEPENDENCE IN HOME PROGRAM FOR SUPPORT OF PROGRESSION       Start:  03/11/24    Expected End:  06/03/24            PATIENT WILL DEMONSTRATE ABILITY TO FOLLOW RCR PRECUATIONS INDEPENDENTLY       Start:  03/11/24    Expected End:  06/03/24

## 2024-03-11 NOTE — PROGRESS NOTES
Subjective   Patient ID: Stefanie Garnett is a 68 y.o. female who presents for Follow-up (mounjaro).    Med Refill    Patient is here for follow-up on Mounjaro  She is losing weight.  Feeling better.  She is on 7.5 mg right now  Her starting weight was 251 on 10/7/2020 she is 231  Going for shoulder surgery    Past recap  patient is here for follow-up on MRI of the right shoulder  She saw the orthopedic doctor and he said it is related to the neck  She went to see the C-spine doctor and MRIs ordered  She feels the pain is all in the shoulder.  She did the MRI  She is losing weight with Mounjaro she lost from 234 last time to 228  She is tolerating the dose not to stay on it.  She never felt this good     patient is here with complaints of having right shoulder pain.  On September 12 patient fell down backwards while trying to sit on the couch and she landed on the right shoulder.  She had x-ray done in the urgent care which was unremarkable.  She did massage  She is doing physical therapy but is not helping she is having difficult time raising her arm above the head   Patient is also here for refill on Mounjaro it is helping her wants to increase the dose      Past recap   patient is here for follow-up on obesity.  She also lives in her.  Lost 10 pounds.  Only side effect is constipation but taking over-the-counter stool softeners and helping       her patient had appointment with the plastic surgeon  She is going for breast reduction  She needs to lose some weight and she also needs mammogram  Mounjaro is helping to lose some weight she lost 5 pounds  She wants to increase the dose as she is tolerating it well  She is having problems with constipation for Mounjaro she was in the urgent care  Because she slid down the stairs and hurt the right shoulder was not able to move x-ray was negative     patient here for follow-up  Did blood work  Got new CPAP machine  She is using her CPAP machine regularly  Follow-up  "on hypertension high cholesterol  Having a lot of acid  Not able to lose weight.  Does not follow diet  Does not exercise  Fell 2 weeks ago and hurting on the right shoulder.  Range of motion of the shoulder is normal  She is gaining weight and her breast size is getting weightbearing and causing her back pain wants with reduction surgerypast recap  Did pulmonary function test  Did 2D echo  She needs clearance from cardiologist for her work     Patient is here for follow-up on blood work  She quit taking her metformin  She quit using CPAP machine because of the cough  She did pulmonary function test but results are not available  She saw the allergist and sleep doctor who recommended her to wash the tubing to avoid coughing  Patient coughs only at night during the day she is fine  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  Patient has not lost weight  She has not changed her eating habits yet        Patient is here for follow-up on obstructive sleep apnea  She is using the CPAP machine it is helping her  Follow-up on hypertension high cholesterol coronary artery disease   needs medication refill  Not watching diet gained weight     Needs flu shot  Overdue for GYN exam  She is seriously considering gastric bypass surgery because she is not able to lose weight she wants insurance papers filled for her hospital stay for MI     She was not using her CPAP machine  Came to the hospital with chest pain and non-ST elevation MI had repeat cardiac catheter done did not show any significant blockage  Patient was treated with antibiotic and steroid and feeling better  Her blood pressure was high and Norvasc was started She has degenerative changes in the lower back so she cannot do heavy lifting     Review of Systems    Objective   /64   Ht 1.626 m (5' 4\")   Wt 105 kg (231 lb)   BMI 39.65 kg/m²     Physical Exam  Vitals reviewed.   Constitutional:       Appearance: Normal appearance. " She is obese.   HENT:      Head: Normocephalic and atraumatic.      Right Ear: Tympanic membrane, ear canal and external ear normal.      Left Ear: Tympanic membrane, ear canal and external ear normal.      Nose: Nose normal.      Mouth/Throat:      Pharynx: Oropharynx is clear.   Eyes:      Extraocular Movements: Extraocular movements intact.      Conjunctiva/sclera: Conjunctivae normal.      Pupils: Pupils are equal, round, and reactive to light.   Cardiovascular:      Rate and Rhythm: Normal rate and regular rhythm.      Pulses: Normal pulses.      Heart sounds: Normal heart sounds.   Pulmonary:      Effort: Pulmonary effort is normal.      Breath sounds: Normal breath sounds.   Abdominal:      General: Abdomen is flat. Bowel sounds are normal.      Palpations: Abdomen is soft.   Musculoskeletal:         General: Tenderness present.      Cervical back: Normal range of motion and neck supple.      Comments: Right shoulder tenderness and limited range of motion   Skin:     General: Skin is warm and dry.   Neurological:      General: No focal deficit present.      Mental Status: She is alert and oriented to person, place, and time.   Psychiatric:         Mood and Affect: Mood normal.         Assessment/Plan   Problem List Items Addressed This Visit             ICD-10-CM       Endocrine/Metabolic    Diabetes mellitus (CMS/HCC) E11.9    Relevant Medications    tirzepatide (Mounjaro) 10 mg/0.5 mL pen injector    Elevated blood sugar R73.9    Relevant Medications    tirzepatide (Mounjaro) 10 mg/0.5 mL pen injector     Other Visit Diagnoses         Codes    Elevated liver enzymes     R74.8    Relevant Medications    tirzepatide (Mounjaro) 10 mg/0.5 mL pen injector        past recap  1. CAD. Had NSTEMI August 2015. Catheter showed no definite high-grade culprit lesion that would necessitate intervention. There was some moderate disease in the distal aspect of the first marginal branch of the LCx, but otherwise the patient  had very minimal CAD. Echocardiogram done June 2017 showed an EF of 60-65%. Patient had repeat exercise nuclear stress test June 2017 which was negative for ischemia. Patient had again acute MI cardiac catheter did not show any blockage. MI was attributed to coronary spasm     2. Hypertension. Adequately controlled      3. Hyperlipidemia. . Continue atorvastatin 80 mg daily as well as Zetia 10 mg daily.     4. Obesity. Weight loss was encouraged. Refer to Dr. Skip Suarez for considering gastric bypass     5. Sleep apnea. Using CPAP     6. Carotid ultrasound. No hemodynamically significant stenosis when done June 2017.     7. Diabetes  A1c 6.4  Doing better  Continue Metformin     #8 acute sinusitis  Treat with Z-Tien     Flu shot given  Mammogram ordered  Meds refilled for 6 months          6/27/2023  Blood work reviewed  A1c 6.3 slightly up  Cholesterol under control  Patient is using CPAP with improvement in symptoms uses every night 4 to 6 hours  Patient is not able to lose weight again discussed various options  Encourage diet and exercise   10/7/2023  X-ray of the right shoulder not needed as joint has full range of motion  Treat with Flexeril and Voltaren gel topically  Refer to plastics for breast hypertrophy  Blood work reviewed  Diabetes under control at 6.2  Cholesterol under control  Blood pressure stable TSH is slightly out of range which has happened in the past  We will monitor for now  Discussed diet and exercise to lose weight  We will try Wegovy versus Mounjaro what ever insurance covers to help her lose weight  Using CPAP  Follow-up blood work in 3 months    10/31/2023  Will increase dose of Mounjaro  Again discussed diet and exercise  Mammogram ordered  Call us 100 twice a day for constipation  She had a fall she slid down the stairs went to the urgent care and her right shoulder she hurt but now she is able to move it  Examination is normal  Follow-up in a month    11/27/2023  Patient is tolerating  medication well had good response  Will increase the dose    1/2/2024  Patient probably has frozen shoulder  Will get MRI of the right shoulder  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    1/30/2024  MRI of the shoulder showed right shoulder supraspinatus full-thickness tear also shows tendinitis  Refer to orthopedic for further management  Refills given on Mounjaro 7.5  Continue diet and exercise  Blood work reviewed  A1c down to 5.8  ALT slightly elevated at 56  Continue diet exercise  Follow-up blood work in 3 months  Needs to repeat TSH   Follow-up in a month for Mounjaro    224  Will increase dose of 10 mg  Patient is tolerating no side effects  Losing weight  Still continue diet and exercise  Follow-up in a month

## 2024-03-15 ENCOUNTER — APPOINTMENT (OUTPATIENT)
Dept: PRIMARY CARE | Facility: CLINIC | Age: 69
End: 2024-03-15
Payer: COMMERCIAL

## 2024-03-18 ENCOUNTER — TREATMENT (OUTPATIENT)
Dept: OCCUPATIONAL THERAPY | Facility: CLINIC | Age: 69
End: 2024-03-18
Payer: COMMERCIAL

## 2024-03-18 ENCOUNTER — OFFICE VISIT (OUTPATIENT)
Dept: ORTHOPEDIC SURGERY | Facility: CLINIC | Age: 69
End: 2024-03-18
Payer: COMMERCIAL

## 2024-03-18 DIAGNOSIS — M75.101 NONTRAUMATIC TEAR OF RIGHT SUPRASPINATUS TENDON: ICD-10-CM

## 2024-03-18 DIAGNOSIS — M25.511 RIGHT SHOULDER PAIN: ICD-10-CM

## 2024-03-18 DIAGNOSIS — M75.101 NONTRAUMATIC TEAR OF RIGHT SUPRASPINATUS TENDON: Primary | ICD-10-CM

## 2024-03-18 PROCEDURE — 1160F RVW MEDS BY RX/DR IN RCRD: CPT | Performed by: ORTHOPAEDIC SURGERY

## 2024-03-18 PROCEDURE — 97016 VASOPNEUMATIC DEVICE THERAPY: CPT | Mod: GO

## 2024-03-18 PROCEDURE — 1036F TOBACCO NON-USER: CPT | Performed by: ORTHOPAEDIC SURGERY

## 2024-03-18 PROCEDURE — 99024 POSTOP FOLLOW-UP VISIT: CPT | Performed by: ORTHOPAEDIC SURGERY

## 2024-03-18 PROCEDURE — 97110 THERAPEUTIC EXERCISES: CPT | Mod: GO

## 2024-03-18 PROCEDURE — 3044F HG A1C LEVEL LT 7.0%: CPT | Performed by: ORTHOPAEDIC SURGERY

## 2024-03-18 PROCEDURE — 97140 MANUAL THERAPY 1/> REGIONS: CPT | Mod: GO

## 2024-03-18 PROCEDURE — 1159F MED LIST DOCD IN RCRD: CPT | Performed by: ORTHOPAEDIC SURGERY

## 2024-03-18 PROCEDURE — 3048F LDL-C <100 MG/DL: CPT | Performed by: ORTHOPAEDIC SURGERY

## 2024-03-18 PROCEDURE — 4010F ACE/ARB THERAPY RXD/TAKEN: CPT | Performed by: ORTHOPAEDIC SURGERY

## 2024-03-18 ASSESSMENT — PAIN - FUNCTIONAL ASSESSMENT: PAIN_FUNCTIONAL_ASSESSMENT: 0-10

## 2024-03-18 ASSESSMENT — PAIN SCALES - GENERAL: PAINLEVEL_OUTOF10: 5 - MODERATE PAIN

## 2024-03-18 NOTE — PROGRESS NOTES
Occupational Therapy Treatment    Name: Stefanie Quinterosyll  MRN: [id number]  : 1955  Date: 24  Time Calculation  Start Time: 1115  Stop Time: 1200  Time Calculation (min): 45 min  OT Therapeutic Procedures Time Entry  Manual Therapy Time Entry: 15  Therapeutic Exercise Time Entry: 15, OT Modalities Time Entry  Vasopneumatic Devices Time Entry: 15    Assessment: Pt tolerates PROM well with minimal discomfort.  Needed review to perform exercises correctly and to avoid active shoulder motion      Plan: Continue with PROM protocol        Subjective It hurts on top of my shoulder   General:        Pain Assessment: 7/10 R shoulder        Objective      Modalities:  Gameready x 15 min     Communication:     Splinting:     Therapeutic Exercise  Pendulums x 2 min   Passive shoulder flexion table slides x 15x3  Scapular retraction x 10x2  AA elbow flexion x 10x3         Manual Therapy  PROM R shoulder with pt supine- scaption, ER to 30, and IR to body       Therapy/Activity:   Strength:     Other Activity:     Outcome Measures:      OP EDUCATION: Reviewed rotator cuff precautions again.        Goals:  Active       OT Problem       PATIENT WILL DEMONSTRATE INDEPENDENCE WITH UPPER BODY donning and doffing all UE clothes       Start:  24    Expected End:  04/15/24            PATIENT WILL DEMONSTRATE INDEPENDENCE WITH LOWER BODY donning and doffing all LE clothes        Start:  24    Expected End:  04/15/24            PATIENT WILL  style HAIR WITH independent       Start:  24    Expected End:  24            PATIENT WILL ACHIEVE right SHOULDER FLEXION STRENGTH OF 4/5       Start:  24    Expected End:  24            PATIENT WILL ACHIEVE right SHOULDER ABDUCTION STRENGTH OF 4/5       Start:  24    Expected End:  24            PATIENT WILL ACHIEVE right SHOULDER EXTERNAL ROTATION STRENGTH OF 4/5 IN neutral       Start:  24    Expected End:  24             PATIENT WILL ACHIEVE right SHOULDER FLEXION  DEGREES       Start:  03/11/24    Expected End:  05/20/24            PATIENT WILL ACHIEVE right SHOULDER ABDUCTION  DEGREES       Start:  03/11/24    Expected End:  05/20/24            PATIENT WILL ACHIEVE right SHOULDER EXTERNAL ROTATION OF 80 DEGREES IN 90 degrees abd       Start:  03/11/24    Expected End:  05/20/24            PATIENT WILL ACHIEVE right SHOULDER INTERNAL ROTATION TO L5 to improve upper body dressing and bathing        Start:  03/11/24    Expected End:  05/20/24            PATIENT WILL SHOW A SIGNIFICANT CHANGE IN UEFI PATIENT REPORTED OUTCOME TOOL TO DEMONSTRATE SUBJECTIVE IMPROVEMENT       Start:  03/11/24    Expected End:  06/03/24            PATIENT WILL REPORT PAIN OF 0-1/10 DEMONSTRATING A REDUCTION OF OVERALL PAIN       Start:  03/11/24    Expected End:  05/20/24            PATIENT WILL DEMONSTRATE INDEPENDENCE IN HOME PROGRAM FOR SUPPORT OF PROGRESSION       Start:  03/11/24    Expected End:  06/03/24            PATIENT WILL DEMONSTRATE ABILITY TO FOLLOW RCR PRECUATIONS INDEPENDENTLY       Start:  03/11/24    Expected End:  06/03/24

## 2024-03-18 NOTE — PROGRESS NOTES
Reason for Appointment  Chief Complaint   Patient presents with    Right Shoulder - Post-op     History of Present Illness  Patient is here today 2 weeks s/p Right shoulder arthroscopic rotator cuff repair and biceps tenodesis on 3/6/24. Patient is doing well overall, wounds are healing nicely with no signs of infection, sutures were removed today.  She is working in therapy on passive shoulder motion and will continue to work on this for another 4 weeks.  She understands no heavy lifting with this arm, she understands postoperative protocol fully.  We did go over her intraoperative pictures.  We will follow-up with her in 4 weeks.    Assessment   Right shoulder rotator cuff tear

## 2024-03-25 ENCOUNTER — APPOINTMENT (OUTPATIENT)
Dept: OCCUPATIONAL THERAPY | Facility: CLINIC | Age: 69
End: 2024-03-25
Payer: COMMERCIAL

## 2024-03-26 ENCOUNTER — TREATMENT (OUTPATIENT)
Dept: OCCUPATIONAL THERAPY | Facility: CLINIC | Age: 69
End: 2024-03-26
Payer: COMMERCIAL

## 2024-03-26 DIAGNOSIS — M75.101 NONTRAUMATIC TEAR OF RIGHT SUPRASPINATUS TENDON: ICD-10-CM

## 2024-03-26 DIAGNOSIS — M25.511 RIGHT SHOULDER PAIN: ICD-10-CM

## 2024-03-26 PROCEDURE — 97016 VASOPNEUMATIC DEVICE THERAPY: CPT | Mod: GO

## 2024-03-26 PROCEDURE — 97140 MANUAL THERAPY 1/> REGIONS: CPT | Mod: GO

## 2024-03-26 NOTE — PROGRESS NOTES
"Occupational Therapy    Occupational Therapy Treatment    Name: Stefanie Cisneros Dhyll  MRN: [id number]  : 1955  Date: 24  Time Calculation  Start Time: 153  Stop Time: 1615  Time Calculation (min): 40 min  OT Therapeutic Procedures Time Entry  Manual Therapy Time Entry: 25, OT Modalities Time Entry  Vasopneumatic Devices Time Entry: 15    Assessment: Pt is progressing well. Reports decreased shoulder pain from 7/10 to 3 /10     Plan: Continue with PROM protocol        Subjective \"My shoulder is getting better, it doesn't hurt as much\"  General:        Pain Assessment: 3/10 R shoulder        Objective      Modalities:  Gameready x 15 min     Communication:     Splinting:     Therapeutic Exercise  Pendulums x 2 min   Passive shoulder flexion table slides x 15x3  Scapular retraction x 10x2  AA elbow flexion x 10x3         Manual Therapy         Therapy/Activity:   Strength:     Other Activity:     Outcome Measures:      OP EDUCATION: Reviewed rotator cuff precautions again.        Goals:  Active       OT Problem       PATIENT WILL DEMONSTRATE INDEPENDENCE WITH UPPER BODY donning and doffing all UE clothes       Start:  24    Expected End:  04/15/24            PATIENT WILL DEMONSTRATE INDEPENDENCE WITH LOWER BODY donning and doffing all LE clothes        Start:  24    Expected End:  04/15/24            PATIENT WILL  style HAIR WITH independent       Start:  24    Expected End:  24            PATIENT WILL ACHIEVE right SHOULDER FLEXION STRENGTH OF 4/5       Start:  24    Expected End:  24            PATIENT WILL ACHIEVE right SHOULDER ABDUCTION STRENGTH OF 4/5       Start:  24    Expected End:  24            PATIENT WILL ACHIEVE right SHOULDER EXTERNAL ROTATION STRENGTH OF 4/5 IN neutral       Start:  24    Expected End:  24            PATIENT WILL ACHIEVE right SHOULDER FLEXION  DEGREES       Start:  24    Expected End:  24       "      PATIENT WILL ACHIEVE right SHOULDER ABDUCTION  DEGREES       Start:  03/11/24    Expected End:  05/20/24            PATIENT WILL ACHIEVE right SHOULDER EXTERNAL ROTATION OF 80 DEGREES IN 90 degrees abd       Start:  03/11/24    Expected End:  05/20/24            PATIENT WILL ACHIEVE right SHOULDER INTERNAL ROTATION TO L5 to improve upper body dressing and bathing        Start:  03/11/24    Expected End:  05/20/24            PATIENT WILL SHOW A SIGNIFICANT CHANGE IN UEFI PATIENT REPORTED OUTCOME TOOL TO DEMONSTRATE SUBJECTIVE IMPROVEMENT       Start:  03/11/24    Expected End:  06/03/24            PATIENT WILL REPORT PAIN OF 0-1/10 DEMONSTRATING A REDUCTION OF OVERALL PAIN       Start:  03/11/24    Expected End:  05/20/24            PATIENT WILL DEMONSTRATE INDEPENDENCE IN HOME PROGRAM FOR SUPPORT OF PROGRESSION       Start:  03/11/24    Expected End:  06/03/24            PATIENT WILL DEMONSTRATE ABILITY TO FOLLOW RCR PRECUATIONS INDEPENDENTLY       Start:  03/11/24    Expected End:  06/03/24

## 2024-04-01 ENCOUNTER — TREATMENT (OUTPATIENT)
Dept: OCCUPATIONAL THERAPY | Facility: CLINIC | Age: 69
End: 2024-04-01
Payer: COMMERCIAL

## 2024-04-01 DIAGNOSIS — M75.101 NONTRAUMATIC TEAR OF RIGHT SUPRASPINATUS TENDON: ICD-10-CM

## 2024-04-01 DIAGNOSIS — M25.511 RIGHT SHOULDER PAIN: ICD-10-CM

## 2024-04-01 PROCEDURE — 97016 VASOPNEUMATIC DEVICE THERAPY: CPT | Mod: GO

## 2024-04-01 PROCEDURE — 97110 THERAPEUTIC EXERCISES: CPT | Mod: GO

## 2024-04-01 PROCEDURE — 97140 MANUAL THERAPY 1/> REGIONS: CPT | Mod: GO

## 2024-04-01 NOTE — PROGRESS NOTES
"  Occupational Therapy    Occupational Therapy Treatment    Name: Stefanie Cisneros Dhyll  MRN: [id number]  : 1955  Date: 24  Time Calculation  Start Time: 1107  Stop Time: 1155  Time Calculation (min): 48 min  OT Therapeutic Procedures Time Entry  Manual Therapy Time Entry: 18  Therapeutic Exercise Time Entry: 15, OT Modalities Time Entry  Vasopneumatic Devices Time Entry: 15    Assessment: Pt is progressing well at 4 weeks post op. Reports decreased shoulder pain from 7/10 to 3 /10     Plan: Continue with PROM protocol        Subjective \"The ice really helps with the soreness \"  General:        Pain Assessment: 3/10 R shoulder        Objective      Modalities:  Gameready x 15 min     Communication:     Splinting:     Therapeutic Exercise  Pendulums x 2 min   Passive shoulder flexion table slides x 15x3  Scapular retraction x 10x2           Manual Therapy  Passive shoulder flexion,ER, IR to body with pt supine   STM to delts and scar mobs          Therapy/Activity:   Strength:     Other Activity:     Outcome Measures:      OP EDUCATION: Reviewed rotator cuff precautions again.        Goals:  Active       OT Problem       PATIENT WILL DEMONSTRATE INDEPENDENCE WITH UPPER BODY donning and doffing all UE clothes       Start:  24    Expected End:  04/15/24            PATIENT WILL DEMONSTRATE INDEPENDENCE WITH LOWER BODY donning and doffing all LE clothes        Start:  24    Expected End:  04/15/24            PATIENT WILL  style HAIR WITH independent       Start:  24    Expected End:  24            PATIENT WILL ACHIEVE right SHOULDER FLEXION STRENGTH OF 4/5       Start:  24    Expected End:  24            PATIENT WILL ACHIEVE right SHOULDER ABDUCTION STRENGTH OF 4/5       Start:  24    Expected End:  24            PATIENT WILL ACHIEVE right SHOULDER EXTERNAL ROTATION STRENGTH OF 4/5 IN neutral       Start:  24    Expected End:  24            PATIENT " WILL ACHIEVE right SHOULDER FLEXION  DEGREES       Start:  03/11/24    Expected End:  05/20/24            PATIENT WILL ACHIEVE right SHOULDER ABDUCTION  DEGREES       Start:  03/11/24    Expected End:  05/20/24            PATIENT WILL ACHIEVE right SHOULDER EXTERNAL ROTATION OF 80 DEGREES IN 90 degrees abd       Start:  03/11/24    Expected End:  05/20/24            PATIENT WILL ACHIEVE right SHOULDER INTERNAL ROTATION TO L5 to improve upper body dressing and bathing        Start:  03/11/24    Expected End:  05/20/24            PATIENT WILL SHOW A SIGNIFICANT CHANGE IN UEFI PATIENT REPORTED OUTCOME TOOL TO DEMONSTRATE SUBJECTIVE IMPROVEMENT       Start:  03/11/24    Expected End:  06/03/24            PATIENT WILL REPORT PAIN OF 0-1/10 DEMONSTRATING A REDUCTION OF OVERALL PAIN       Start:  03/11/24    Expected End:  05/20/24            PATIENT WILL DEMONSTRATE INDEPENDENCE IN HOME PROGRAM FOR SUPPORT OF PROGRESSION       Start:  03/11/24    Expected End:  06/03/24            PATIENT WILL DEMONSTRATE ABILITY TO FOLLOW RCR PRECUATIONS INDEPENDENTLY       Start:  03/11/24    Expected End:  06/03/24

## 2024-04-08 ENCOUNTER — TREATMENT (OUTPATIENT)
Dept: OCCUPATIONAL THERAPY | Facility: CLINIC | Age: 69
End: 2024-04-08
Payer: COMMERCIAL

## 2024-04-08 DIAGNOSIS — M75.101 NONTRAUMATIC TEAR OF RIGHT SUPRASPINATUS TENDON: ICD-10-CM

## 2024-04-08 DIAGNOSIS — M25.511 RIGHT SHOULDER PAIN: ICD-10-CM

## 2024-04-08 PROCEDURE — 97110 THERAPEUTIC EXERCISES: CPT | Mod: GO

## 2024-04-08 PROCEDURE — 97016 VASOPNEUMATIC DEVICE THERAPY: CPT | Mod: GO

## 2024-04-08 NOTE — PROGRESS NOTES
"Occupational Therapy      Occupational Therapy Treatment    Name: Stefanie Quinterosyll  MRN: [id number]  : 1955  Date: 24  Time Calculation  Start Time: 1115  Stop Time: 1155  Time Calculation (min): 40 min  OT Therapeutic Procedures Time Entry  Therapeutic Exercise Time Entry: 25, OT Modalities Time Entry  Vasopneumatic Devices Time Entry: 15    Assessment: Pt is progressing well at 5 weeks post op. Reported slight increase in shoulder pain from tripping on steps      Plan: Continue with PROM protocol        Subjective \"My shoulder is a little sore today.  I kind of tripped going up the stairs and jerked my arm\"  General:        Pain Assessment: 5/10 R shoulder        Objective      Modalities:  Gameready x 15 min     Communication:     Splinting:     Therapeutic Exercise  Pendulums x 2 min   Passive shoulder flexion table slides x 15x3  Scapular retraction x 10x2  Supine Passive shoulder flexion x 15x3           Manual Therapy           Therapy/Activity:   Strength:     Other Activity:     Outcome Measures:      OP EDUCATION: Reviewed rotator cuff precautions again.        Goals:  Active       OT Problem       PATIENT WILL DEMONSTRATE INDEPENDENCE WITH UPPER BODY donning and doffing all UE clothes       Start:  24    Expected End:  04/15/24            PATIENT WILL DEMONSTRATE INDEPENDENCE WITH LOWER BODY donning and doffing all LE clothes        Start:  24    Expected End:  04/15/24            PATIENT WILL  style HAIR WITH independent       Start:  24    Expected End:  24            PATIENT WILL ACHIEVE right SHOULDER FLEXION STRENGTH OF 4/5       Start:  24    Expected End:  24            PATIENT WILL ACHIEVE right SHOULDER ABDUCTION STRENGTH OF 4/5       Start:  24    Expected End:  24            PATIENT WILL ACHIEVE right SHOULDER EXTERNAL ROTATION STRENGTH OF 4/5 IN neutral       Start:  24    Expected End:  24            PATIENT WILL " ACHIEVE right SHOULDER FLEXION  DEGREES       Start:  03/11/24    Expected End:  05/20/24            PATIENT WILL ACHIEVE right SHOULDER ABDUCTION  DEGREES       Start:  03/11/24    Expected End:  05/20/24            PATIENT WILL ACHIEVE right SHOULDER EXTERNAL ROTATION OF 80 DEGREES IN 90 degrees abd       Start:  03/11/24    Expected End:  05/20/24            PATIENT WILL ACHIEVE right SHOULDER INTERNAL ROTATION TO L5 to improve upper body dressing and bathing        Start:  03/11/24    Expected End:  05/20/24            PATIENT WILL SHOW A SIGNIFICANT CHANGE IN UEFI PATIENT REPORTED OUTCOME TOOL TO DEMONSTRATE SUBJECTIVE IMPROVEMENT       Start:  03/11/24    Expected End:  06/03/24            PATIENT WILL REPORT PAIN OF 0-1/10 DEMONSTRATING A REDUCTION OF OVERALL PAIN       Start:  03/11/24    Expected End:  05/20/24            PATIENT WILL DEMONSTRATE INDEPENDENCE IN HOME PROGRAM FOR SUPPORT OF PROGRESSION       Start:  03/11/24    Expected End:  06/03/24            PATIENT WILL DEMONSTRATE ABILITY TO FOLLOW RCR PRECUATIONS INDEPENDENTLY       Start:  03/11/24    Expected End:  06/03/24

## 2024-04-16 ENCOUNTER — TREATMENT (OUTPATIENT)
Dept: OCCUPATIONAL THERAPY | Facility: CLINIC | Age: 69
End: 2024-04-16
Payer: COMMERCIAL

## 2024-04-16 DIAGNOSIS — M25.511 RIGHT SHOULDER PAIN: ICD-10-CM

## 2024-04-16 DIAGNOSIS — M75.101 NONTRAUMATIC TEAR OF RIGHT SUPRASPINATUS TENDON: ICD-10-CM

## 2024-04-16 PROCEDURE — 97016 VASOPNEUMATIC DEVICE THERAPY: CPT | Mod: GO

## 2024-04-16 PROCEDURE — 97110 THERAPEUTIC EXERCISES: CPT | Mod: GO

## 2024-04-16 NOTE — PROGRESS NOTES
"Occupational Therapy      Occupational Therapy Treatment    Name: Stefanie Quinterosyll  MRN: [id number]  : 1955  Date: 24  Time Calculation  Start Time: 1135  Stop Time: 1230  Time Calculation (min): 55 min  OT Therapeutic Procedures Time Entry  Therapeutic Exercise Time Entry: 45, OT Modalities Time Entry  Vasopneumatic Devices Time Entry: 15    Assessment: Pt is now 6 weeks post op. Demonstrates significant decrease in AROM all planes.       Plan: Will advance to A/AAROM exercises.       Subjective \"It's hard to raise my arm\"  General:        Pain Assessment: 5/10 R shoulder        Objective    AROM R shoulder:  Flexion: 20  Abduction: 20  ER: 50  IR: PSIS    Modalities:  Gameready x 15 min     Communication:     Splinting:     Therapeutic Exercise  Pendulums x 2 min   Wall walks x 8 x 2  Incline towel slides x 10 x 2  Passive shoulder flexion table slides x 15x3  Pulleys x 10x3   Supine cane shoulder flexion x 10x3  B active ER seated x 10x3             Manual Therapy           Therapy/Activity:   Strength:     Other Activity:     Outcome Measures:      OP EDUCATION: Access Code: [email or web address]  URL: [email or web address]  Date: 2024  Prepared by: Tiburcio Malloy    Exercises  - Supine Shoulder Press AAROM in Abduction with Dowel  - 3 x daily - 7 x weekly - 2 sets - 10 reps  - Supine Shoulder Flexion Extension AAROM with Dowel  - 3 x daily - 7 x weekly - 2 sets - 10 reps  - Seated Shoulder Flexion AAROM with Pulley Behind  - 3 x daily - 7 x weekly - 2 sets - 10 reps  - Shoulder Flexion Wall Walk  - 3 x daily - 7 x weekly - 2 sets - 10 reps       Goals:  Active       OT Problem       PATIENT WILL DEMONSTRATE INDEPENDENCE WITH UPPER BODY donning and doffing all UE clothes       Start:  24    Expected End:  04/15/24            PATIENT WILL DEMONSTRATE INDEPENDENCE WITH LOWER BODY donning and doffing all LE clothes        Start:  24    Expected End:  04/15/24            " PATIENT WILL  style HAIR WITH independent       Start:  03/11/24    Expected End:  05/06/24            PATIENT WILL ACHIEVE right SHOULDER FLEXION STRENGTH OF 4/5       Start:  03/11/24    Expected End:  06/03/24            PATIENT WILL ACHIEVE right SHOULDER ABDUCTION STRENGTH OF 4/5       Start:  03/11/24    Expected End:  06/03/24            PATIENT WILL ACHIEVE right SHOULDER EXTERNAL ROTATION STRENGTH OF 4/5 IN neutral       Start:  03/11/24    Expected End:  06/03/24            PATIENT WILL ACHIEVE right SHOULDER FLEXION  DEGREES       Start:  03/11/24    Expected End:  05/20/24            PATIENT WILL ACHIEVE right SHOULDER ABDUCTION  DEGREES       Start:  03/11/24    Expected End:  05/20/24            PATIENT WILL ACHIEVE right SHOULDER EXTERNAL ROTATION OF 80 DEGREES IN 90 degrees abd       Start:  03/11/24    Expected End:  05/20/24            PATIENT WILL ACHIEVE right SHOULDER INTERNAL ROTATION TO L5 to improve upper body dressing and bathing        Start:  03/11/24    Expected End:  05/20/24            PATIENT WILL SHOW A SIGNIFICANT CHANGE IN UEFI PATIENT REPORTED OUTCOME TOOL TO DEMONSTRATE SUBJECTIVE IMPROVEMENT       Start:  03/11/24    Expected End:  06/03/24            PATIENT WILL REPORT PAIN OF 0-1/10 DEMONSTRATING A REDUCTION OF OVERALL PAIN       Start:  03/11/24    Expected End:  05/20/24            PATIENT WILL DEMONSTRATE INDEPENDENCE IN HOME PROGRAM FOR SUPPORT OF PROGRESSION       Start:  03/11/24    Expected End:  06/03/24            PATIENT WILL DEMONSTRATE ABILITY TO FOLLOW RCR PRECUATIONS INDEPENDENTLY       Start:  03/11/24    Expected End:  06/03/24

## 2024-04-22 ENCOUNTER — TREATMENT (OUTPATIENT)
Dept: OCCUPATIONAL THERAPY | Facility: CLINIC | Age: 69
End: 2024-04-22
Payer: COMMERCIAL

## 2024-04-22 DIAGNOSIS — M25.511 RIGHT SHOULDER PAIN: ICD-10-CM

## 2024-04-22 DIAGNOSIS — M75.101 NONTRAUMATIC TEAR OF RIGHT SUPRASPINATUS TENDON: ICD-10-CM

## 2024-04-22 PROCEDURE — 97110 THERAPEUTIC EXERCISES: CPT | Mod: GO

## 2024-04-22 PROCEDURE — 97016 VASOPNEUMATIC DEVICE THERAPY: CPT | Mod: GO

## 2024-04-22 NOTE — PROGRESS NOTES
"    Occupational Therapy      Occupational Therapy Treatment    Name: Stefanie Quinterosyll  MRN: [id number]  : 1955  Date: 24  Time Calculation  Start Time: 1114  Stop Time: 1200  Time Calculation (min): 46 min  OT Therapeutic Procedures Time Entry  Therapeutic Exercise Time Entry: 31, OT Modalities Time Entry  Vasopneumatic Devices Time Entry: 15    Assessment: Pt demonstrates slight improvement with ability to raise arm      Plan: Continue with A/AAROM exercises.       Subjective \"I do my exercises 2x/day\"  General:        Pain Assessment: 3/10 R shoulder        Objective    AROM R shoulder:  Flexion: 70  Abduction: 20  ER: 50  IR: PSIS    Modalities:  Gameready x 15 min     Communication:     Splinting:     Therapeutic Exercise  Pendulums x 2 min   Wall walks x 8 x 2  Incline towel slides x 10 x 2  Around the waist with ball x 3 min   Pulleys x 10x3   Supine cane shoulder flexion x 10x3  B active ER seated x 10x3             Manual Therapy           Therapy/Activity:   Strength:     Other Activity:     Outcome Measures:      OP EDUCATION: Access Code: [email or web address]  URL: [email or web address]  Date: 2024  Prepared by: Tiburcio Malloy    Exercises  - Supine Shoulder Press AAROM in Abduction with Dowel  - 3 x daily - 7 x weekly - 2 sets - 10 reps  - Supine Shoulder Flexion Extension AAROM with Dowel  - 3 x daily - 7 x weekly - 2 sets - 10 reps  - Seated Shoulder Flexion AAROM with Pulley Behind  - 3 x daily - 7 x weekly - 2 sets - 10 reps  - Shoulder Flexion Wall Walk  - 3 x daily - 7 x weekly - 2 sets - 10 reps       Goals:  Active       OT Problem       PATIENT WILL DEMONSTRATE INDEPENDENCE WITH UPPER BODY donning and doffing all UE clothes       Start:  24    Expected End:  04/15/24            PATIENT WILL DEMONSTRATE INDEPENDENCE WITH LOWER BODY donning and doffing all LE clothes        Start:  24    Expected End:  04/15/24            PATIENT WILL  style HAIR WITH " independent       Start:  03/11/24    Expected End:  05/06/24            PATIENT WILL ACHIEVE right SHOULDER FLEXION STRENGTH OF 4/5       Start:  03/11/24    Expected End:  06/03/24            PATIENT WILL ACHIEVE right SHOULDER ABDUCTION STRENGTH OF 4/5       Start:  03/11/24    Expected End:  06/03/24            PATIENT WILL ACHIEVE right SHOULDER EXTERNAL ROTATION STRENGTH OF 4/5 IN neutral       Start:  03/11/24    Expected End:  06/03/24            PATIENT WILL ACHIEVE right SHOULDER FLEXION  DEGREES       Start:  03/11/24    Expected End:  05/20/24            PATIENT WILL ACHIEVE right SHOULDER ABDUCTION  DEGREES       Start:  03/11/24    Expected End:  05/20/24            PATIENT WILL ACHIEVE right SHOULDER EXTERNAL ROTATION OF 80 DEGREES IN 90 degrees abd       Start:  03/11/24    Expected End:  05/20/24            PATIENT WILL ACHIEVE right SHOULDER INTERNAL ROTATION TO L5 to improve upper body dressing and bathing        Start:  03/11/24    Expected End:  05/20/24            PATIENT WILL SHOW A SIGNIFICANT CHANGE IN UEFI PATIENT REPORTED OUTCOME TOOL TO DEMONSTRATE SUBJECTIVE IMPROVEMENT       Start:  03/11/24    Expected End:  06/03/24            PATIENT WILL REPORT PAIN OF 0-1/10 DEMONSTRATING A REDUCTION OF OVERALL PAIN       Start:  03/11/24    Expected End:  05/20/24            PATIENT WILL DEMONSTRATE INDEPENDENCE IN HOME PROGRAM FOR SUPPORT OF PROGRESSION       Start:  03/11/24    Expected End:  06/03/24            PATIENT WILL DEMONSTRATE ABILITY TO FOLLOW RCR PRECUATIONS INDEPENDENTLY       Start:  03/11/24    Expected End:  06/03/24

## 2024-04-29 ENCOUNTER — TREATMENT (OUTPATIENT)
Dept: OCCUPATIONAL THERAPY | Facility: CLINIC | Age: 69
End: 2024-04-29
Payer: COMMERCIAL

## 2024-04-29 ENCOUNTER — OFFICE VISIT (OUTPATIENT)
Dept: ORTHOPEDIC SURGERY | Facility: CLINIC | Age: 69
End: 2024-04-29
Payer: COMMERCIAL

## 2024-04-29 DIAGNOSIS — M75.101 NONTRAUMATIC TEAR OF RIGHT SUPRASPINATUS TENDON: ICD-10-CM

## 2024-04-29 DIAGNOSIS — M75.101 NONTRAUMATIC TEAR OF RIGHT SUPRASPINATUS TENDON: Primary | ICD-10-CM

## 2024-04-29 DIAGNOSIS — M25.511 RIGHT SHOULDER PAIN: ICD-10-CM

## 2024-04-29 PROCEDURE — 97110 THERAPEUTIC EXERCISES: CPT | Mod: GO

## 2024-04-29 PROCEDURE — 1160F RVW MEDS BY RX/DR IN RCRD: CPT | Performed by: ORTHOPAEDIC SURGERY

## 2024-04-29 PROCEDURE — 3048F LDL-C <100 MG/DL: CPT | Performed by: ORTHOPAEDIC SURGERY

## 2024-04-29 PROCEDURE — 1036F TOBACCO NON-USER: CPT | Performed by: ORTHOPAEDIC SURGERY

## 2024-04-29 PROCEDURE — 4010F ACE/ARB THERAPY RXD/TAKEN: CPT | Performed by: ORTHOPAEDIC SURGERY

## 2024-04-29 PROCEDURE — 1159F MED LIST DOCD IN RCRD: CPT | Performed by: ORTHOPAEDIC SURGERY

## 2024-04-29 PROCEDURE — 3044F HG A1C LEVEL LT 7.0%: CPT | Performed by: ORTHOPAEDIC SURGERY

## 2024-04-29 PROCEDURE — 99024 POSTOP FOLLOW-UP VISIT: CPT | Performed by: ORTHOPAEDIC SURGERY

## 2024-04-29 ASSESSMENT — PAIN - FUNCTIONAL ASSESSMENT: PAIN_FUNCTIONAL_ASSESSMENT: 0-10

## 2024-04-29 ASSESSMENT — PAIN SCALES - GENERAL: PAINLEVEL_OUTOF10: 0 - NO PAIN

## 2024-04-29 NOTE — PROGRESS NOTES
"    Occupational Therapy      Occupational Therapy Treatment    Name: Stefanie Quinterosyll  MRN: [id number]  : 1955  Date: 24  Time Calculation  Start Time: 1336  Stop Time: 1415  Time Calculation (min): 39 min  OT Therapeutic Procedures Time Entry  Therapeutic Exercise Time Entry: 39,      Assessment: Pt demonstrates slight improvement with ability to raise arm      Plan: Continue with A/AAROM exercises.       Subjective \"I do my exercises 2x/day\"  General:        Pain Assessment: 3/10 R shoulder        Objective    AROM R shoulder:  Flexion: 90  Abduction: 20  ER: 60  IR: L5    Modalities:       Communication:     Splinting:     Therapeutic Exercise  Supine active shoulder flexion x 10x3  Wall walks x 10x3   Incline towel slides x 10 x 2  Wand sh extension x 10x2  Pulleys x 10x3   Supine cane shoulder flexion x 10x3  Ball walks x 10x2             Manual Therapy           Therapy/Activity:   Strength:     Other Activity:     Outcome Measures:      OP EDUCATION: Access Code: [email or web address]  URL: [email or web address]  Date: 2024  Prepared by: Tiburcio Malloy    Exercises  - Supine Shoulder Press AAROM in Abduction with Dowel  - 3 x daily - 7 x weekly - 2 sets - 10 reps  - Supine Shoulder Flexion Extension AAROM with Dowel  - 3 x daily - 7 x weekly - 2 sets - 10 reps  - Seated Shoulder Flexion AAROM with Pulley Behind  - 3 x daily - 7 x weekly - 2 sets - 10 reps  - Shoulder Flexion Wall Walk  - 3 x daily - 7 x weekly - 2 sets - 10 reps       Goals:  Active       OT Problem       PATIENT WILL DEMONSTRATE INDEPENDENCE WITH UPPER BODY donning and doffing all UE clothes       Start:  24    Expected End:  04/15/24            PATIENT WILL DEMONSTRATE INDEPENDENCE WITH LOWER BODY donning and doffing all LE clothes        Start:  24    Expected End:  04/15/24            PATIENT WILL  style HAIR WITH independent       Start:  24    Expected End:  24            PATIENT " WILL ACHIEVE right SHOULDER FLEXION STRENGTH OF 4/5       Start:  03/11/24    Expected End:  06/03/24            PATIENT WILL ACHIEVE right SHOULDER ABDUCTION STRENGTH OF 4/5       Start:  03/11/24    Expected End:  06/03/24            PATIENT WILL ACHIEVE right SHOULDER EXTERNAL ROTATION STRENGTH OF 4/5 IN neutral       Start:  03/11/24    Expected End:  06/03/24            PATIENT WILL ACHIEVE right SHOULDER FLEXION  DEGREES       Start:  03/11/24    Expected End:  05/20/24            PATIENT WILL ACHIEVE right SHOULDER ABDUCTION  DEGREES       Start:  03/11/24    Expected End:  05/20/24            PATIENT WILL ACHIEVE right SHOULDER EXTERNAL ROTATION OF 80 DEGREES IN 90 degrees abd       Start:  03/11/24    Expected End:  05/20/24            PATIENT WILL ACHIEVE right SHOULDER INTERNAL ROTATION TO L5 to improve upper body dressing and bathing        Start:  03/11/24    Expected End:  05/20/24            PATIENT WILL SHOW A SIGNIFICANT CHANGE IN UEFI PATIENT REPORTED OUTCOME TOOL TO DEMONSTRATE SUBJECTIVE IMPROVEMENT       Start:  03/11/24    Expected End:  06/03/24            PATIENT WILL REPORT PAIN OF 0-1/10 DEMONSTRATING A REDUCTION OF OVERALL PAIN       Start:  03/11/24    Expected End:  05/20/24            PATIENT WILL DEMONSTRATE INDEPENDENCE IN HOME PROGRAM FOR SUPPORT OF PROGRESSION       Start:  03/11/24    Expected End:  06/03/24            PATIENT WILL DEMONSTRATE ABILITY TO FOLLOW RCR PRECUATIONS INDEPENDENTLY       Start:  03/11/24    Expected End:  06/03/24

## 2024-04-29 NOTE — PROGRESS NOTES
Reason for Appointment  Chief Complaint   Patient presents with    Right Shoulder - Follow-up, Post-op     History of Present Illness  Patient is here today 8 weeks s/p right arthroscopic cuff repair. Patient is doing well overall. She is already working on active motion with good early improvement, no injuries, pain slowly decreasing. She understands postoperative protocol. Follow-up in 8 weeks.     Assessment   Encounter Diagnosis   Name Primary?    Nontraumatic tear of right supraspinatus tendon Yes       I, Natalia Chen, attest that this documentation has been prepared under the direction and in the presence of Gume Fragoso MD. By signing below, I, Gume Fragoso MD, personally performed the services described in this documentation. All medical record entries made by the scribe were at my direction and in my presence. I have reviewed the chart and agree that the record reflects my personal performance and is accurate and complete. 04/29/24,n

## 2024-05-06 ENCOUNTER — TREATMENT (OUTPATIENT)
Dept: OCCUPATIONAL THERAPY | Facility: CLINIC | Age: 69
End: 2024-05-06
Payer: COMMERCIAL

## 2024-05-06 DIAGNOSIS — M75.101 NONTRAUMATIC TEAR OF RIGHT SUPRASPINATUS TENDON: ICD-10-CM

## 2024-05-06 DIAGNOSIS — M25.511 RIGHT SHOULDER PAIN: ICD-10-CM

## 2024-05-06 PROCEDURE — 97110 THERAPEUTIC EXERCISES: CPT | Mod: GO

## 2024-05-06 NOTE — PROGRESS NOTES
"    Occupational Therapy      Occupational Therapy Treatment    Name: Stefanie Cisneros Dhyll  MRN: [id number]  : 1955  Date: 24  Time Calculation  Start Time: 1115  Stop Time: 1200  Time Calculation (min): 45 min  OT Therapeutic Procedures Time Entry  Therapeutic Exercise Time Entry: 45,      Assessment: Pt demonstrates improved shoulder flexion and IR R shoulder      Plan: Continue with A/AAROM exercises.       Subjective \" I can raise it a little higher\"  General:        Pain Assessment: 3/10 R shoulder        Objective    AROM R shoulder:  Flexion: 120  Abduction: 60  ER: 70  IR: L5    Modalities:       Communication:     Splinting:     Therapeutic Exercise  Supine active shoulder flexion x 10x3  Wall walks x 10x3   Around the worlds x 3 min   Pulleys x 10x3   Bent over row , ext, and horizontal abd x 10x2 each   Supine cane shoulder flexion x 10x3  Ball walks x 10x2             Manual Therapy           Therapy/Activity:   Strength:     Other Activity:     Outcome Measures:      OP EDUCATION:      Goals:  Active       OT Problem       PATIENT WILL DEMONSTRATE INDEPENDENCE WITH UPPER BODY donning and doffing all UE clothes       Start:  24    Expected End:  04/15/24            PATIENT WILL DEMONSTRATE INDEPENDENCE WITH LOWER BODY donning and doffing all LE clothes        Start:  24    Expected End:  04/15/24            PATIENT WILL  style HAIR WITH independent       Start:  24    Expected End:  24            PATIENT WILL ACHIEVE right SHOULDER FLEXION STRENGTH OF 4/5       Start:  24    Expected End:  24            PATIENT WILL ACHIEVE right SHOULDER ABDUCTION STRENGTH OF 4/5       Start:  24    Expected End:  24            PATIENT WILL ACHIEVE right SHOULDER EXTERNAL ROTATION STRENGTH OF 4/5 IN neutral       Start:  24    Expected End:  24            PATIENT WILL ACHIEVE right SHOULDER FLEXION  DEGREES       Start:  24    Expected " End:  05/20/24            PATIENT WILL ACHIEVE right SHOULDER ABDUCTION  DEGREES       Start:  03/11/24    Expected End:  05/20/24            PATIENT WILL ACHIEVE right SHOULDER EXTERNAL ROTATION OF 80 DEGREES IN 90 degrees abd       Start:  03/11/24    Expected End:  05/20/24            PATIENT WILL ACHIEVE right SHOULDER INTERNAL ROTATION TO L5 to improve upper body dressing and bathing        Start:  03/11/24    Expected End:  05/20/24            PATIENT WILL SHOW A SIGNIFICANT CHANGE IN UEFI PATIENT REPORTED OUTCOME TOOL TO DEMONSTRATE SUBJECTIVE IMPROVEMENT       Start:  03/11/24    Expected End:  06/03/24            PATIENT WILL REPORT PAIN OF 0-1/10 DEMONSTRATING A REDUCTION OF OVERALL PAIN       Start:  03/11/24    Expected End:  05/20/24            PATIENT WILL DEMONSTRATE INDEPENDENCE IN HOME PROGRAM FOR SUPPORT OF PROGRESSION       Start:  03/11/24    Expected End:  06/03/24            PATIENT WILL DEMONSTRATE ABILITY TO FOLLOW RCR PRECUATIONS INDEPENDENTLY       Start:  03/11/24    Expected End:  06/03/24

## 2024-05-13 ENCOUNTER — TREATMENT (OUTPATIENT)
Dept: OCCUPATIONAL THERAPY | Facility: CLINIC | Age: 69
End: 2024-05-13
Payer: COMMERCIAL

## 2024-05-13 DIAGNOSIS — M75.101 NONTRAUMATIC TEAR OF RIGHT SUPRASPINATUS TENDON: ICD-10-CM

## 2024-05-13 DIAGNOSIS — M25.511 RIGHT SHOULDER PAIN: ICD-10-CM

## 2024-05-13 PROCEDURE — 97110 THERAPEUTIC EXERCISES: CPT | Mod: GO

## 2024-05-13 PROCEDURE — 97140 MANUAL THERAPY 1/> REGIONS: CPT | Mod: GO

## 2024-05-13 NOTE — PROGRESS NOTES
"        Occupational Therapy      Occupational Therapy Treatment    Name: Stefanie Cisneros Dhyll  MRN: [id number]  : 1955  Date: 24  Time Calculation  Start Time: 1147  Stop Time: 1235  Time Calculation (min): 48 min  OT Therapeutic Procedures Time Entry  Manual Therapy Time Entry: 15  Therapeutic Exercise Time Entry: 33,      Assessment: Pt demonstrated ability to raise arm actively to 140 degrees       Plan: Continue with A/AAROM exercises.       Subjective \" I've been working on more and I can lift my arm higher\"          Pain Assessment: 3/10 R shoulder        Objective    AROM R shoulder:  Flexion: 140  Abduction: 60  ER: 70  IR: L5    Modalities:       Communication:     Splinting:     Therapeutic Exercise  Supine active shoulder flexion x 10x3  Wall walks x 10x3   Around the worlds x 3 min   Pulleys x 10x3   Bent over row , ext, and horizontal abd x 10x2 each   Supine ball shoulder circles   Ball walks x 10x2             Manual Therapy  Scar mobs   STM to biceps and delt          Therapy/Activity:   Strength:     Other Activity:     Outcome Measures:      OP EDUCATION:      Goals:  Active       OT Problem       PATIENT WILL DEMONSTRATE INDEPENDENCE WITH UPPER BODY donning and doffing all UE clothes       Start:  24    Expected End:  04/15/24            PATIENT WILL DEMONSTRATE INDEPENDENCE WITH LOWER BODY donning and doffing all LE clothes        Start:  24    Expected End:  04/15/24            PATIENT WILL  style HAIR WITH independent       Start:  24    Expected End:  24            PATIENT WILL ACHIEVE right SHOULDER FLEXION STRENGTH OF 4/5       Start:  24    Expected End:  24            PATIENT WILL ACHIEVE right SHOULDER ABDUCTION STRENGTH OF 4/5       Start:  24    Expected End:  24            PATIENT WILL ACHIEVE right SHOULDER EXTERNAL ROTATION STRENGTH OF 4/5 IN neutral       Start:  24    Expected End:  24            PATIENT WILL " ACHIEVE right SHOULDER FLEXION  DEGREES       Start:  03/11/24    Expected End:  05/20/24            PATIENT WILL ACHIEVE right SHOULDER ABDUCTION  DEGREES       Start:  03/11/24    Expected End:  05/20/24            PATIENT WILL ACHIEVE right SHOULDER EXTERNAL ROTATION OF 80 DEGREES IN 90 degrees abd       Start:  03/11/24    Expected End:  05/20/24            PATIENT WILL ACHIEVE right SHOULDER INTERNAL ROTATION TO L5 to improve upper body dressing and bathing        Start:  03/11/24    Expected End:  05/20/24            PATIENT WILL SHOW A SIGNIFICANT CHANGE IN UEFI PATIENT REPORTED OUTCOME TOOL TO DEMONSTRATE SUBJECTIVE IMPROVEMENT       Start:  03/11/24    Expected End:  06/03/24            PATIENT WILL REPORT PAIN OF 0-1/10 DEMONSTRATING A REDUCTION OF OVERALL PAIN       Start:  03/11/24    Expected End:  05/20/24            PATIENT WILL DEMONSTRATE INDEPENDENCE IN HOME PROGRAM FOR SUPPORT OF PROGRESSION       Start:  03/11/24    Expected End:  06/03/24            PATIENT WILL DEMONSTRATE ABILITY TO FOLLOW RCR PRECUATIONS INDEPENDENTLY       Start:  03/11/24    Expected End:  06/03/24

## 2024-05-28 ENCOUNTER — TREATMENT (OUTPATIENT)
Dept: OCCUPATIONAL THERAPY | Facility: CLINIC | Age: 69
End: 2024-05-28
Payer: COMMERCIAL

## 2024-05-28 ENCOUNTER — OFFICE VISIT (OUTPATIENT)
Dept: PRIMARY CARE | Facility: CLINIC | Age: 69
End: 2024-05-28
Payer: COMMERCIAL

## 2024-05-28 VITALS
SYSTOLIC BLOOD PRESSURE: 136 MMHG | BODY MASS INDEX: 42.17 KG/M2 | HEIGHT: 64 IN | WEIGHT: 247 LBS | DIASTOLIC BLOOD PRESSURE: 74 MMHG

## 2024-05-28 DIAGNOSIS — K21.9 GASTROESOPHAGEAL REFLUX DISEASE WITHOUT ESOPHAGITIS: ICD-10-CM

## 2024-05-28 DIAGNOSIS — I10 ESSENTIAL (PRIMARY) HYPERTENSION: ICD-10-CM

## 2024-05-28 DIAGNOSIS — E78.5 HYPERLIPIDEMIA, UNSPECIFIED HYPERLIPIDEMIA TYPE: ICD-10-CM

## 2024-05-28 DIAGNOSIS — R73.9 ELEVATED BLOOD SUGAR: ICD-10-CM

## 2024-05-28 DIAGNOSIS — M75.101 NONTRAUMATIC TEAR OF RIGHT SUPRASPINATUS TENDON: ICD-10-CM

## 2024-05-28 DIAGNOSIS — I25.10 ARTERIOSCLEROSIS OF CORONARY ARTERY: ICD-10-CM

## 2024-05-28 DIAGNOSIS — E11.9 TYPE 2 DIABETES MELLITUS WITHOUT COMPLICATION, WITHOUT LONG-TERM CURRENT USE OF INSULIN (MULTI): ICD-10-CM

## 2024-05-28 DIAGNOSIS — E78.5 HYPERLIPIDEMIA, UNSPECIFIED: ICD-10-CM

## 2024-05-28 DIAGNOSIS — M25.511 RIGHT SHOULDER PAIN: ICD-10-CM

## 2024-05-28 DIAGNOSIS — R74.8 ELEVATED LIVER ENZYMES: ICD-10-CM

## 2024-05-28 PROCEDURE — 3044F HG A1C LEVEL LT 7.0%: CPT | Performed by: INTERNAL MEDICINE

## 2024-05-28 PROCEDURE — 3048F LDL-C <100 MG/DL: CPT | Performed by: INTERNAL MEDICINE

## 2024-05-28 PROCEDURE — 99214 OFFICE O/P EST MOD 30 MIN: CPT | Performed by: INTERNAL MEDICINE

## 2024-05-28 PROCEDURE — 4010F ACE/ARB THERAPY RXD/TAKEN: CPT | Performed by: INTERNAL MEDICINE

## 2024-05-28 PROCEDURE — 3078F DIAST BP <80 MM HG: CPT | Performed by: INTERNAL MEDICINE

## 2024-05-28 PROCEDURE — 97110 THERAPEUTIC EXERCISES: CPT | Mod: GO

## 2024-05-28 PROCEDURE — 97140 MANUAL THERAPY 1/> REGIONS: CPT | Mod: GO

## 2024-05-28 PROCEDURE — 3075F SYST BP GE 130 - 139MM HG: CPT | Performed by: INTERNAL MEDICINE

## 2024-05-28 RX ORDER — METOPROLOL TARTRATE 50 MG/1
50 TABLET ORAL 2 TIMES DAILY
Qty: 180 TABLET | Refills: 0 | Status: SHIPPED | OUTPATIENT
Start: 2024-05-28

## 2024-05-28 RX ORDER — TIRZEPATIDE 10 MG/.5ML
10 INJECTION, SOLUTION SUBCUTANEOUS
Qty: 3 ML | Refills: 0 | Status: SHIPPED | OUTPATIENT
Start: 2024-05-28

## 2024-05-28 RX ORDER — ATORVASTATIN CALCIUM 80 MG/1
80 TABLET, FILM COATED ORAL NIGHTLY
Qty: 90 TABLET | Refills: 0 | Status: SHIPPED | OUTPATIENT
Start: 2024-05-28

## 2024-05-28 RX ORDER — PANTOPRAZOLE SODIUM 40 MG/1
40 TABLET, DELAYED RELEASE ORAL DAILY
Qty: 90 TABLET | Refills: 0 | Status: SHIPPED | OUTPATIENT
Start: 2024-05-28

## 2024-05-28 RX ORDER — EZETIMIBE 10 MG/1
10 TABLET ORAL DAILY
Qty: 90 TABLET | Refills: 0 | Status: SHIPPED | OUTPATIENT
Start: 2024-05-28

## 2024-05-28 RX ORDER — AMLODIPINE BESYLATE 5 MG/1
5 TABLET ORAL DAILY
Qty: 90 TABLET | Refills: 0 | Status: SHIPPED | OUTPATIENT
Start: 2024-05-28

## 2024-05-28 NOTE — PROGRESS NOTES
Subjective   Patient ID: Stefanie Garnett is a 69 y.o. female who presents for Follow-up and Med Refill.    Med Refill    Patient is here for follow-up  Had right shoulder surgery.  Doing better  Follow-up on hypertension high cholesterol coronary artery disease  GERD  Using CPAP for sleep apnea    Past recap  Patient is here for follow-up on Mounjaro  She is losing weight.  Feeling better.  She is on 7.5 mg right now  Her starting weight was 251 on 10/7/2020 she is 231  Going for shoulder surgery    Past recap  patient is here for follow-up on MRI of the right shoulder  She saw the orthopedic doctor and he said it is related to the neck  She went to see the C-spine doctor and MRIs ordered  She feels the pain is all in the shoulder.  She did the MRI  She is losing weight with Mounjaro she lost from 234 last time to 228  She is tolerating the dose not to stay on it.  She never felt this good     patient is here with complaints of having right shoulder pain.  On September 12 patient fell down backwards while trying to sit on the couch and she landed on the right shoulder.  She had x-ray done in the urgent care which was unremarkable.  She did massage  She is doing physical therapy but is not helping she is having difficult time raising her arm above the head   Patient is also here for refill on Mounjaro it is helping her wants to increase the dose      Past recap   patient is here for follow-up on obesity.  She also lives in her.  Lost 10 pounds.  Only side effect is constipation but taking over-the-counter stool softeners and helping       her patient had appointment with the plastic surgeon  She is going for breast reduction  She needs to lose some weight and she also needs mammogram  Mounjaro is helping to lose some weight she lost 5 pounds  She wants to increase the dose as she is tolerating it well  She is having problems with constipation for Mounjaro she was in the urgent care  Because she slid down the  stairs and hurt the right shoulder was not able to move x-ray was negative     patient here for follow-up  Did blood work  Got new CPAP machine  She is using her CPAP machine regularly  Follow-up on hypertension high cholesterol  Having a lot of acid  Not able to lose weight.  Does not follow diet  Does not exercise  Fell 2 weeks ago and hurting on the right shoulder.  Range of motion of the shoulder is normal  She is gaining weight and her breast size is getting weightbearing and causing her back pain wants with reduction surgerypast recap  Did pulmonary function test  Did 2D echo  She needs clearance from cardiologist for her work     Patient is here for follow-up on blood work  She quit taking her metformin  She quit using CPAP machine because of the cough  She did pulmonary function test but results are not available  She saw the allergist and sleep doctor who recommended her to wash the tubing to avoid coughing  Patient coughs only at night during the day she is fine  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  Patient has not lost weight  She has not changed her eating habits yet        Patient is here for follow-up on obstructive sleep apnea  She is using the CPAP machine it is helping her  Follow-up on hypertension high cholesterol coronary artery disease   needs medication refill  Not watching diet gained weight     Needs flu shot  Overdue for GYN exam  She is seriously considering gastric bypass surgery because she is not able to lose weight she wants insurance papers filled for her hospital stay for MI     She was not using her CPAP machine  Came to the hospital with chest pain and non-ST elevation MI had repeat cardiac catheter done did not show any significant blockage  Patient was treated with antibiotic and steroid and feeling better  Her blood pressure was high and Norvasc was started She has degenerative changes in the lower back so she cannot do heavy lifting  "    Review of Systems    Objective   /74   Ht 1.626 m (5' 4\")   Wt 112 kg (247 lb)   BMI 42.40 kg/m²     Physical Exam  Vitals reviewed.   Constitutional:       Appearance: Normal appearance. She is obese.   HENT:      Head: Normocephalic and atraumatic.      Right Ear: Tympanic membrane, ear canal and external ear normal.      Left Ear: Tympanic membrane, ear canal and external ear normal.      Nose: Nose normal.      Mouth/Throat:      Pharynx: Oropharynx is clear.   Eyes:      Extraocular Movements: Extraocular movements intact.      Conjunctiva/sclera: Conjunctivae normal.      Pupils: Pupils are equal, round, and reactive to light.   Cardiovascular:      Rate and Rhythm: Normal rate and regular rhythm.      Pulses: Normal pulses.      Heart sounds: Normal heart sounds.   Pulmonary:      Effort: Pulmonary effort is normal.      Breath sounds: Normal breath sounds.   Abdominal:      General: Abdomen is flat. Bowel sounds are normal.      Palpations: Abdomen is soft.   Musculoskeletal:         General: Tenderness present.      Cervical back: Normal range of motion and neck supple.      Comments: Right shoulder tenderness and limited range of motion   Skin:     General: Skin is warm and dry.   Neurological:      General: No focal deficit present.      Mental Status: She is alert and oriented to person, place, and time.   Psychiatric:         Mood and Affect: Mood normal.         Assessment/Plan   Problem List Items Addressed This Visit             ICD-10-CM       Cardiac and Vasculature    Arteriosclerosis of coronary artery I25.10    Relevant Medications    amLODIPine (Norvasc) 5 mg tablet    ezetimibe (Zetia) 10 mg tablet    metoprolol tartrate (Lopressor) 50 mg tablet    Other Relevant Orders    CBC    Comprehensive Metabolic Panel    Hemoglobin A1C    Lipid Panel    Thyroid Stimulating Hormone    Hyperlipemia E78.5    Relevant Orders    CBC    Comprehensive Metabolic Panel    Hemoglobin A1C    Lipid " Panel    Thyroid Stimulating Hormone       Endocrine/Metabolic    Diabetes mellitus (Multi) E11.9    Relevant Medications    tirzepatide (Mounjaro) 10 mg/0.5 mL pen injector    Other Relevant Orders    CBC    Comprehensive Metabolic Panel    Hemoglobin A1C    Lipid Panel    Thyroid Stimulating Hormone    Elevated blood sugar R73.9    Relevant Medications    tirzepatide (Mounjaro) 10 mg/0.5 mL pen injector    Other Relevant Orders    CBC    Comprehensive Metabolic Panel    Hemoglobin A1C    Lipid Panel    Thyroid Stimulating Hormone       Gastrointestinal and Abdominal    GERD (gastroesophageal reflux disease) K21.9    Relevant Medications    pantoprazole (ProtoNix) 40 mg EC tablet    Other Relevant Orders    CBC    Comprehensive Metabolic Panel    Hemoglobin A1C    Lipid Panel    Thyroid Stimulating Hormone     Other Visit Diagnoses         Codes    Essential (primary) hypertension     I10    Relevant Medications    amLODIPine (Norvasc) 5 mg tablet    metoprolol tartrate (Lopressor) 50 mg tablet    Other Relevant Orders    CBC    Comprehensive Metabolic Panel    Hemoglobin A1C    Lipid Panel    Thyroid Stimulating Hormone    Hyperlipidemia, unspecified     E78.5    Relevant Medications    atorvastatin (Lipitor) 80 mg tablet    Other Relevant Orders    CBC    Comprehensive Metabolic Panel    Hemoglobin A1C    Lipid Panel    Thyroid Stimulating Hormone    CBC    Comprehensive Metabolic Panel    Hemoglobin A1C    Lipid Panel    Thyroid Stimulating Hormone    Elevated liver enzymes     R74.8    Relevant Medications    tirzepatide (Mounjaro) 10 mg/0.5 mL pen injector    Other Relevant Orders    CBC    Comprehensive Metabolic Panel    Hemoglobin A1C    Lipid Panel    Thyroid Stimulating Hormone          past recap  1. CAD. Had NSTEMI August 2015. Catheter showed no definite high-grade culprit lesion that would necessitate intervention. There was some moderate disease in the distal aspect of the first marginal branch of  the LCx, but otherwise the patient had very minimal CAD. Echocardiogram done June 2017 showed an EF of 60-65%. Patient had repeat exercise nuclear stress test June 2017 which was negative for ischemia. Patient had again acute MI cardiac catheter did not show any blockage. MI was attributed to coronary spasm     2. Hypertension. Adequately controlled      3. Hyperlipidemia. . Continue atorvastatin 80 mg daily as well as Zetia 10 mg daily.     4. Obesity. Weight loss was encouraged. Refer to Dr. Skip Suarez for considering gastric bypass     5. Sleep apnea. Using CPAP     6. Carotid ultrasound. No hemodynamically significant stenosis when done June 2017.     7. Diabetes  A1c 6.4  Doing better  Continue Metformin     #8 acute sinusitis  Treat with Z-Tien     Flu shot given  Mammogram ordered  Meds refilled for 6 months          6/27/2023  Blood work reviewed  A1c 6.3 slightly up  Cholesterol under control  Patient is using CPAP with improvement in symptoms uses every night 4 to 6 hours  Patient is not able to lose weight again discussed various options  Encourage diet and exercise   10/7/2023  X-ray of the right shoulder not needed as joint has full range of motion  Treat with Flexeril and Voltaren gel topically  Refer to plastics for breast hypertrophy  Blood work reviewed  Diabetes under control at 6.2  Cholesterol under control  Blood pressure stable TSH is slightly out of range which has happened in the past  We will monitor for now  Discussed diet and exercise to lose weight  We will try Wegovy versus Mounjaro what ever insurance covers to help her lose weight  Using CPAP  Follow-up blood work in 3 months    10/31/2023  Will increase dose of Mounjaro  Again discussed diet and exercise  Mammogram ordered  Call us 100 twice a day for constipation  She had a fall she slid down the stairs went to the urgent care and her right shoulder she hurt but now she is able to move it  Examination is normal  Follow-up in a  month    11/27/2023  Patient is tolerating medication well had good response  Will increase the dose    1/2/2024  Patient probably has frozen shoulder  Will get MRI of the right shoulder  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    1/30/2024  MRI of the shoulder showed right shoulder supraspinatus full-thickness tear also shows tendinitis  Refer to orthopedic for further management  Refills given on Mounjaro 7.5  Continue diet and exercise  Blood work reviewed  A1c down to 5.8  ALT slightly elevated at 56  Continue diet exercise  Follow-up blood work in 3 months  Needs to repeat TSH   Follow-up in a month for Mounjaro    224  Will increase dose of 10 mg  Patient is tolerating no side effects  Losing weight  Still continue diet and exercise  Follow-up in a month    5/28/2024  Blood pressure stable  Patient is due for blood work she did not do the blood work  Blood work ordered  Medications refilled for 90 days  Follow-up after blood work  Again encourage patient to use Mounjaro. continue diet and exercise  Continue Mounjaro if it helps to lose weight  As long as she tolerates

## 2024-05-28 NOTE — PROGRESS NOTES
"        Occupational Therapy      Occupational Therapy Treatment    Name: Stefanie Cisneros Dhyll  MRN: [id number]  : 1955  Date: 24  Time Calculation  Start Time: 1100  Stop Time: 1145  Time Calculation (min): 45 min  OT Therapeutic Procedures Time Entry  Manual Therapy Time Entry: 10  Therapeutic Exercise Time Entry: 35,      Assessment: Pt demonstrated ability to raise arm actively to 150 degrees.      Plan: Advance to light PRE's       Subjective \" I can do a little more with my arm\"          Pain Assessment: 2/10 R shoulder        Objective    AROM R shoulder:  Flexion: 150  Abduction: 120  ER: 70  IR: L5    Modalities:       Communication:     Splinting:     Therapeutic Exercise  Supine active shoulder flexion x 10x3  Wall walks x 10x3   Around the worlds x 3 min   Row and extension x 15x3 with yellow band   Bent over row , ext, and horizontal abd x 10x2 each   Supine cane flexion x 15x3  Ball walks x 10x2             Manual Therapy  Scar mobs   STM to biceps and delt          Therapy/Activity:   Strength:     Other Activity:     Outcome Measures:      OP EDUCATION:      Goals:  Active       OT Problem       PATIENT WILL DEMONSTRATE INDEPENDENCE WITH UPPER BODY donning and doffing all UE clothes       Start:  24    Expected End:  04/15/24            PATIENT WILL DEMONSTRATE INDEPENDENCE WITH LOWER BODY donning and doffing all LE clothes        Start:  24    Expected End:  04/15/24            PATIENT WILL  style HAIR WITH independent       Start:  24    Expected End:  24            PATIENT WILL ACHIEVE right SHOULDER FLEXION STRENGTH OF 4/5       Start:  24    Expected End:  24            PATIENT WILL ACHIEVE right SHOULDER ABDUCTION STRENGTH OF 4/5       Start:  24    Expected End:  24            PATIENT WILL ACHIEVE right SHOULDER EXTERNAL ROTATION STRENGTH OF 4/5 IN neutral       Start:  24    Expected End:  24            PATIENT WILL " ACHIEVE right SHOULDER FLEXION  DEGREES       Start:  03/11/24    Expected End:  05/20/24            PATIENT WILL ACHIEVE right SHOULDER ABDUCTION  DEGREES       Start:  03/11/24    Expected End:  05/20/24            PATIENT WILL ACHIEVE right SHOULDER EXTERNAL ROTATION OF 80 DEGREES IN 90 degrees abd       Start:  03/11/24    Expected End:  05/20/24            PATIENT WILL ACHIEVE right SHOULDER INTERNAL ROTATION TO L5 to improve upper body dressing and bathing        Start:  03/11/24    Expected End:  05/20/24            PATIENT WILL SHOW A SIGNIFICANT CHANGE IN UEFI PATIENT REPORTED OUTCOME TOOL TO DEMONSTRATE SUBJECTIVE IMPROVEMENT       Start:  03/11/24    Expected End:  06/03/24            PATIENT WILL REPORT PAIN OF 0-1/10 DEMONSTRATING A REDUCTION OF OVERALL PAIN       Start:  03/11/24    Expected End:  05/20/24            PATIENT WILL DEMONSTRATE INDEPENDENCE IN HOME PROGRAM FOR SUPPORT OF PROGRESSION       Start:  03/11/24    Expected End:  06/03/24            PATIENT WILL DEMONSTRATE ABILITY TO FOLLOW RCR PRECUATIONS INDEPENDENTLY       Start:  03/11/24    Expected End:  06/03/24

## 2024-06-03 ENCOUNTER — TREATMENT (OUTPATIENT)
Dept: OCCUPATIONAL THERAPY | Facility: CLINIC | Age: 69
End: 2024-06-03
Payer: COMMERCIAL

## 2024-06-03 DIAGNOSIS — M25.511 RIGHT SHOULDER PAIN: ICD-10-CM

## 2024-06-03 DIAGNOSIS — M75.101 NONTRAUMATIC TEAR OF RIGHT SUPRASPINATUS TENDON: Primary | ICD-10-CM

## 2024-06-03 PROCEDURE — 97110 THERAPEUTIC EXERCISES: CPT | Mod: GO

## 2024-06-10 ENCOUNTER — TREATMENT (OUTPATIENT)
Dept: OCCUPATIONAL THERAPY | Facility: CLINIC | Age: 69
End: 2024-06-10
Payer: COMMERCIAL

## 2024-06-10 DIAGNOSIS — M25.511 RIGHT SHOULDER PAIN: ICD-10-CM

## 2024-06-10 DIAGNOSIS — M75.101 NONTRAUMATIC TEAR OF RIGHT SUPRASPINATUS TENDON: ICD-10-CM

## 2024-06-10 PROCEDURE — 97110 THERAPEUTIC EXERCISES: CPT | Mod: GO

## 2024-06-10 NOTE — PROGRESS NOTES
"  Occupational Therapy      Occupational Therapy Treatment    Name: Stefanie Quinterosyll  MRN: [id number]  : 1955  Date: 06/10/24  Time Calculation  Start Time: 1105   ,      Assessment: Pt demonstrated ability to raise arm actively to 160 degrees. Weakness noted with rotator cuff strengthening      Plan: Continue to advance  PRE's as tolerated        Subjective \" My shoulder feels stiff today          Pain Assessment: 1-2/10 R shoulder        Objective    AROM R shoulder:  Flexion: 160  Abduction: 150  ER: 70  IR: L5  Strength: R   IR 4/5  ER 3/5  Flex 3/5  Abd 3/5   Modalities:       Communication:     Splinting:     Therapeutic Exercise  Supine active shoulder flexion x 10x3  Row and sh extension x 15x3 with 7 lbs   Ball walks x 10x2  ER/IR with yellow band x 15x3   Scaption x 10x3 with 2 lbs   Prone row, T, and Y x 10x2 each  Sidelying ER and scaption x 10x2 each     Manual Therapy         Therapy/Activity:   Strength:     Other Activity:     Outcome Measures:      OP EDUCATION:      Goals:  Active       OT Problem       PATIENT WILL DEMONSTRATE INDEPENDENCE WITH UPPER BODY donning and doffing all UE clothes       Start:  24    Expected End:  04/15/24            PATIENT WILL DEMONSTRATE INDEPENDENCE WITH LOWER BODY donning and doffing all LE clothes        Start:  24    Expected End:  04/15/24            PATIENT WILL  style HAIR WITH independent       Start:  24    Expected End:  24            PATIENT WILL ACHIEVE right SHOULDER FLEXION STRENGTH OF 4/5       Start:  24    Expected End:  24            PATIENT WILL ACHIEVE right SHOULDER ABDUCTION STRENGTH OF 4/5       Start:  24    Expected End:  24            PATIENT WILL ACHIEVE right SHOULDER EXTERNAL ROTATION STRENGTH OF 4/5 IN neutral       Start:  24    Expected End:  24            PATIENT WILL ACHIEVE right SHOULDER FLEXION  DEGREES       Start:  24    Expected End:  24    "         PATIENT WILL ACHIEVE right SHOULDER ABDUCTION  DEGREES       Start:  03/11/24    Expected End:  05/20/24            PATIENT WILL ACHIEVE right SHOULDER EXTERNAL ROTATION OF 80 DEGREES IN 90 degrees abd       Start:  03/11/24    Expected End:  05/20/24            PATIENT WILL ACHIEVE right SHOULDER INTERNAL ROTATION TO L5 to improve upper body dressing and bathing        Start:  03/11/24    Expected End:  05/20/24            PATIENT WILL SHOW A SIGNIFICANT CHANGE IN UEFI PATIENT REPORTED OUTCOME TOOL TO DEMONSTRATE SUBJECTIVE IMPROVEMENT       Start:  03/11/24    Expected End:  06/03/24            PATIENT WILL REPORT PAIN OF 0-1/10 DEMONSTRATING A REDUCTION OF OVERALL PAIN       Start:  03/11/24    Expected End:  05/20/24            PATIENT WILL DEMONSTRATE INDEPENDENCE IN HOME PROGRAM FOR SUPPORT OF PROGRESSION       Start:  03/11/24    Expected End:  06/03/24            PATIENT WILL DEMONSTRATE ABILITY TO FOLLOW RCR PRECUATIONS INDEPENDENTLY       Start:  03/11/24    Expected End:  06/03/24

## 2024-06-17 ENCOUNTER — TREATMENT (OUTPATIENT)
Dept: OCCUPATIONAL THERAPY | Facility: CLINIC | Age: 69
End: 2024-06-17
Payer: COMMERCIAL

## 2024-06-17 DIAGNOSIS — M75.101 NONTRAUMATIC TEAR OF RIGHT SUPRASPINATUS TENDON: ICD-10-CM

## 2024-06-17 DIAGNOSIS — M25.511 RIGHT SHOULDER PAIN: ICD-10-CM

## 2024-06-17 PROCEDURE — 97110 THERAPEUTIC EXERCISES: CPT | Mod: GO

## 2024-06-17 NOTE — PROGRESS NOTES
"      Occupational Therapy      Occupational Therapy Treatment/Discharge Summary    Name: Stefanie Garnett  MRN: [id number]  : 1955  Date: 24  Time Calculation  Start Time: 1147  Stop Time: 1231  Time Calculation (min): 44 min  OT Therapeutic Procedures Time Entry  Therapeutic Exercise Time Entry: 44,      Assessment: Pt has met all goals. Demonstrates independence with ADL's and HEP.      Plan: Discharge OT       Subjective \" I can raise my arm up a lot easier\"          Pain Assessment: 1/10 R shoulder        Objective    AROM R shoulder:  Flexion: 160  Abduction: 160  ER: 80  IR: T12  Strength: R shoulder   IR 4/5  ER 4/5  Flex 4/5  Abd 4/5   Modalities:       Communication:     Splinting:     Therapeutic Exercise  Ball wall taps x 4 min   Row and sh extension x 15x3 with 7 lbs   Ball walks x 10x2  ER/IR with yellow band x 15x3   Scaption x 10x3 with 2 lbs   Prone row, T, and Y x 10x2 each  Sidelying ER and scaption x 10x2 each     Manual Therapy         Therapy/Activity:   Strength:     Other Activity:     Outcome Measures:  UEFI: 72/80      OP EDUCATION:      Goals:  Resolved       OT Problem       PATIENT WILL DEMONSTRATE INDEPENDENCE WITH UPPER BODY donning and doffing all UE clothes (Met)       Start:  24    Expected End:  04/15/24    Resolved:  24         PATIENT WILL DEMONSTRATE INDEPENDENCE WITH LOWER BODY donning and doffing all LE clothes  (Met)       Start:  24    Expected End:  04/15/24    Resolved:  24         PATIENT WILL  style HAIR WITH independent (Met)       Start:  24    Expected End:  24    Resolved:  24         PATIENT WILL ACHIEVE right SHOULDER FLEXION STRENGTH OF 4/5 (Met)       Start:  24    Expected End:  24    Resolved:  24         PATIENT WILL ACHIEVE right SHOULDER ABDUCTION STRENGTH OF 4/5 (Met)       Start:  24    Expected End:  24    Resolved:  24         PATIENT WILL ACHIEVE right " SHOULDER EXTERNAL ROTATION STRENGTH OF 4/5 IN neutral (Met)       Start:  03/11/24    Expected End:  06/03/24    Resolved:  06/17/24         PATIENT WILL ACHIEVE right SHOULDER FLEXION  DEGREES (Met)       Start:  03/11/24    Expected End:  05/20/24    Resolved:  06/17/24         PATIENT WILL ACHIEVE right SHOULDER ABDUCTION  DEGREES (Met)       Start:  03/11/24    Expected End:  05/20/24    Resolved:  06/17/24         PATIENT WILL ACHIEVE right SHOULDER EXTERNAL ROTATION OF 80 DEGREES IN 90 degrees abd (Met)       Start:  03/11/24    Expected End:  05/20/24    Resolved:  06/17/24         PATIENT WILL ACHIEVE right SHOULDER INTERNAL ROTATION TO L5 to improve upper body dressing and bathing  (Met)       Start:  03/11/24    Expected End:  05/20/24    Resolved:  06/17/24         PATIENT WILL SHOW A SIGNIFICANT CHANGE IN UEFI PATIENT REPORTED OUTCOME TOOL TO DEMONSTRATE SUBJECTIVE IMPROVEMENT (Met)       Start:  03/11/24    Expected End:  06/03/24    Resolved:  06/17/24         PATIENT WILL REPORT PAIN OF 0-1/10 DEMONSTRATING A REDUCTION OF OVERALL PAIN (Met)       Start:  03/11/24    Expected End:  05/20/24    Resolved:  06/17/24         PATIENT WILL DEMONSTRATE INDEPENDENCE IN HOME PROGRAM FOR SUPPORT OF PROGRESSION (Met)       Start:  03/11/24    Expected End:  06/03/24    Resolved:  06/17/24         PATIENT WILL DEMONSTRATE ABILITY TO FOLLOW RCR PRECUATIONS INDEPENDENTLY (Met)       Start:  03/11/24    Expected End:  06/03/24    Resolved:  06/17/24

## 2024-06-24 ENCOUNTER — APPOINTMENT (OUTPATIENT)
Dept: ORTHOPEDIC SURGERY | Facility: CLINIC | Age: 69
End: 2024-06-24
Payer: COMMERCIAL

## 2024-06-24 DIAGNOSIS — S46.011A TRAUMATIC TEAR OF RIGHT ROTATOR CUFF, UNSPECIFIED TEAR EXTENT, INITIAL ENCOUNTER: Primary | ICD-10-CM

## 2024-06-24 PROCEDURE — 4010F ACE/ARB THERAPY RXD/TAKEN: CPT | Performed by: ORTHOPAEDIC SURGERY

## 2024-06-24 PROCEDURE — 1036F TOBACCO NON-USER: CPT | Performed by: ORTHOPAEDIC SURGERY

## 2024-06-24 PROCEDURE — 1159F MED LIST DOCD IN RCRD: CPT | Performed by: ORTHOPAEDIC SURGERY

## 2024-06-24 PROCEDURE — 3044F HG A1C LEVEL LT 7.0%: CPT | Performed by: ORTHOPAEDIC SURGERY

## 2024-06-24 PROCEDURE — 3048F LDL-C <100 MG/DL: CPT | Performed by: ORTHOPAEDIC SURGERY

## 2024-06-24 PROCEDURE — 1160F RVW MEDS BY RX/DR IN RCRD: CPT | Performed by: ORTHOPAEDIC SURGERY

## 2024-06-24 PROCEDURE — 99213 OFFICE O/P EST LOW 20 MIN: CPT | Performed by: ORTHOPAEDIC SURGERY

## 2024-06-24 ASSESSMENT — PAIN - FUNCTIONAL ASSESSMENT: PAIN_FUNCTIONAL_ASSESSMENT: 0-10

## 2024-06-24 ASSESSMENT — ENCOUNTER SYMPTOMS
FATIGUE: 0
SHORTNESS OF BREATH: 0
JOINT SWELLING: 0
RHINORRHEA: 0
ARTHRALGIAS: 0
WHEEZING: 0
CHILLS: 0
TROUBLE SWALLOWING: 0
FEVER: 0
COLOR CHANGE: 0

## 2024-06-24 ASSESSMENT — PAIN SCALES - GENERAL: PAINLEVEL_OUTOF10: 0 - NO PAIN

## 2024-06-24 NOTE — PROGRESS NOTES
Reason for Appointment  Chief Complaint   Patient presents with    Right Shoulder - Follow-up     History of Present Illness  Patient is a 69 y.o. female here today for follow-up evaluation 4 months status post a right arthroscopic rotator cuff repair.  She is doing very well, no significant pain in the shoulder today.  She is finishing formal therapy and is in the strengthening phase.  She understands slowly increasing activity and doing more with this arm.  No other changes in her past medical history, allergies, or medications.    Past Medical History:   Diagnosis Date    Arm pain     Arm swelling     Asymptomatic menopausal state     Back pain     Back pain     CAD (coronary artery disease)     CAD (coronary artery disease)     Carpal tunnel syndrome     Chest pain     Cough     CPAP (continuous positive airway pressure) dependence     Diabetes mellitus (Multi)     Dizziness     Elevated blood sugar     Exposure to COVID-19 virus     Fall     Floaters     GERD (gastroesophageal reflux disease)     High cholesterol     HPV in female     Hx of cardiac cath     Hyperlipidemia     Hypertension     Ingrown toenail     Knee pain     Left arm numbness     Left foot pain     Left leg swelling     Meralgia paraesthetica, left     Moderate persistent atopic asthma without complication ([facility])     Morbid obesity (Multi)     Numbness     Obesity     ANGELICA (obstructive sleep apnea)     Pain in arm, unspecified     Arm pain    Pain of lower extremity     Pain of upper extremity     Palpitations     Personal history of other medical treatment 04/03/2019    History of screening mammography    Positive TB test     Restrictive lung disease     Shoulder pain     Sleep apnea     Snoring     SOB (shortness of breath)     Tail bone pain     Vaginal Pap smear        Past Surgical History:   Procedure Laterality Date    CARDIAC CATHETERIZATION      HYSTERECTOMY  07/30/2015    Hysterectomy    HYSTERECTOMY  09/04/2015    Hysterectomy     TUBAL LIGATION  07/30/2015    Tubal Ligation    TUBAL LIGATION  09/04/2015    Tubal Ligation       Medication Documentation Review Audit       Reviewed by Cindy Malave PA-C (Physician Assistant) on 06/24/24 at 1547      Medication Order Taking? Sig Documenting Provider Last Dose Status   amLODIPine (Norvasc) 5 mg tablet [id number] Yes Take 1 tablet (5 mg) by mouth once daily. Lizz Lew MD Taking Active   aspirin 81 mg EC tablet [id number] Yes Take 1 tablet (81 mg) by mouth once daily. Historical Provider, MD Taking Active   atorvastatin (Lipitor) 80 mg tablet [id number] Yes Take 1 tablet (80 mg) by mouth once daily at bedtime. Lizz Lew MD Taking Active   cholecalciferol (Vitamin D-3) 50 MCG (2000 UT) tablet [id number] Yes Take 1 tablet (2,000 Units) by mouth once daily. Historical Provider, MD Taking Active   evolocumab (Repatha SureClick) 140 mg/mL injection [id number] Yes Inject 1 mL (140 mg) under the skin every 14 (fourteen) days. Latayna Severino, APRN-CNP Taking Active   ezetimibe (Zetia) 10 mg tablet [id number] Yes Take 1 tablet (10 mg) by mouth once daily. Lizz Lew MD Taking Active   losartan (Cozaar) 100 mg tablet [id number] Yes Take 1 tablet (100 mg) by mouth once daily. Historical Provider, MD Taking Active   metoprolol tartrate (Lopressor) 50 mg tablet [id number] Yes Take 1 tablet by mouth 2 times a day. Lizz Lew MD Taking Active   omega 3-dha-epa-fish oil 360 mg-108 mg- 180 mg-1,200 mg capsule [id number] Yes Take 1 capsule by mouth once daily. Historical Provider, MD Taking Active   pantoprazole (ProtoNix) 40 mg EC tablet [id number] Yes Take 1 tablet (40 mg) by mouth once daily. Lizz Lew MD Taking Active   tirzepatide (Mounjaro) 10 mg/0.5 mL pen injector [id number] Yes Inject 10 mg under the skin 1 (one) time per week. Lizz Lew MD Taking Active                    No Known Allergies    Review of Systems   Constitutional:  Negative for chills, fatigue and fever.   HENT:  Negative for  rhinorrhea, sneezing and trouble swallowing.    Respiratory:  Negative for shortness of breath and wheezing.    Cardiovascular:  Negative for chest pain and leg swelling.   Musculoskeletal:  Negative for arthralgias and joint swelling.   Skin:  Negative for color change and pallor.     Exam   On exam the right shoulder shows over 150 degrees of active forward flexion, she has mild weakness with resisted external rotation but good early cuff strength.  Deltoid is functional.  Good pulses and sensation in the upper extremity.    Assessment   Right rotator cuff tear    Plan   She will finish out formal therapy and she understands slowly increasing activity and lifting with this arm.  She is doing very well overall, we will follow-up with her in 3 months.    Written by Cindy Fragoso saw, evaluated, and treated the patient with the PA

## 2024-08-03 ENCOUNTER — OFFICE VISIT (OUTPATIENT)
Dept: PRIMARY CARE | Facility: CLINIC | Age: 69
End: 2024-08-03
Payer: COMMERCIAL

## 2024-08-03 ENCOUNTER — LAB (OUTPATIENT)
Dept: LAB | Facility: LAB | Age: 69
End: 2024-08-03
Payer: COMMERCIAL

## 2024-08-03 VITALS
SYSTOLIC BLOOD PRESSURE: 130 MMHG | BODY MASS INDEX: 42.68 KG/M2 | HEIGHT: 64 IN | WEIGHT: 250 LBS | DIASTOLIC BLOOD PRESSURE: 76 MMHG

## 2024-08-03 DIAGNOSIS — M25.562 LEFT KNEE PAIN, UNSPECIFIED CHRONICITY: ICD-10-CM

## 2024-08-03 DIAGNOSIS — E78.5 HYPERLIPIDEMIA, UNSPECIFIED: ICD-10-CM

## 2024-08-03 DIAGNOSIS — I10 ESSENTIAL (PRIMARY) HYPERTENSION: ICD-10-CM

## 2024-08-03 DIAGNOSIS — I25.10 ARTERIOSCLEROSIS OF CORONARY ARTERY: ICD-10-CM

## 2024-08-03 DIAGNOSIS — E11.9 TYPE 2 DIABETES MELLITUS WITHOUT COMPLICATION, WITHOUT LONG-TERM CURRENT USE OF INSULIN (MULTI): ICD-10-CM

## 2024-08-03 DIAGNOSIS — E78.5 HYPERLIPIDEMIA, UNSPECIFIED HYPERLIPIDEMIA TYPE: ICD-10-CM

## 2024-08-03 DIAGNOSIS — R73.9 ELEVATED BLOOD SUGAR: ICD-10-CM
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
## 2024-08-03 DIAGNOSIS — R74.8 ELEVATED LIVER ENZYMES: ICD-10-CM

## 2024-08-03 DIAGNOSIS — M25.462 SWELLING OF LEFT KNEE JOINT: ICD-10-CM

## 2024-08-03 DIAGNOSIS — K21.9 GASTROESOPHAGEAL REFLUX DISEASE WITHOUT ESOPHAGITIS: ICD-10-CM

## 2024-08-03 LAB
ALBUMIN SERPL BCP-MCNC: 3.9 G/DL (ref 3.4–5)
ALP SERPL-CCNC: 88 U/L (ref 33–136)
ALT SERPL W P-5'-P-CCNC: 46 U/L (ref 7–45)
ANION GAP SERPL CALC-SCNC: 9 MMOL/L (ref 10–20)
AST SERPL W P-5'-P-CCNC: 21 U/L (ref 9–39)
BILIRUB SERPL-MCNC: 0.6 MG/DL (ref 0–1.2)
BUN SERPL-MCNC: 10 MG/DL (ref 6–23)
CALCIUM SERPL-MCNC: 9.9 MG/DL (ref 8.6–10.6)
CHLORIDE SERPL-SCNC: 105 MMOL/L (ref 98–107)
CHOLEST SERPL-MCNC: 161 MG/DL (ref 0–199)
CHOLESTEROL/HDL RATIO: 3.2
CO2 SERPL-SCNC: 31 MMOL/L (ref 21–32)
CREAT SERPL-MCNC: 0.8 MG/DL (ref 0.5–1.05)
EGFRCR SERPLBLD CKD-EPI 2021: 81 ML/MIN/1.73M*2
ERYTHROCYTE [DISTWIDTH] IN BLOOD BY AUTOMATED COUNT: 15.8 % (ref 11.5–14.5)
EST. AVERAGE GLUCOSE BLD GHB EST-MCNC: 163 MG/DL
GLUCOSE SERPL-MCNC: 123 MG/DL (ref 74–99)
HBA1C MFR BLD: 7.3 %
HCT VFR BLD AUTO: 41.5 % (ref 36–46)
HDLC SERPL-MCNC: 50.7 MG/DL
HGB BLD-MCNC: 13.7 G/DL (ref 12–16)
LDLC SERPL CALC-MCNC: 86 MG/DL
MCH RBC QN AUTO: 26.1 PG (ref 26–34)
MCHC RBC AUTO-ENTMCNC: 33 G/DL (ref 32–36)
MCV RBC AUTO: 79 FL (ref 80–100)
NON HDL CHOLESTEROL: 110 MG/DL (ref 0–149)
NRBC BLD-RTO: 0 /100 WBCS (ref 0–0)
PLATELET # BLD AUTO: 166 X10*3/UL (ref 150–450)
POTASSIUM SERPL-SCNC: 4.5 MMOL/L (ref 3.5–5.3)
PROT SERPL-MCNC: 6.1 G/DL (ref 6.4–8.2)
RBC # BLD AUTO: 5.25 X10*6/UL (ref 4–5.2)
SODIUM SERPL-SCNC: 140 MMOL/L (ref 136–145)
TRIGL SERPL-MCNC: 124 MG/DL (ref 0–149)
TSH SERPL-ACNC: 1.38 MIU/L (ref 0.44–3.98)
VLDL: 25 MG/DL (ref 0–40)
WBC # BLD AUTO: 6.8 X10*3/UL (ref 4.4–11.3)

## 2024-08-03 PROCEDURE — 1159F MED LIST DOCD IN RCRD: CPT | Performed by: INTERNAL MEDICINE

## 2024-08-03 PROCEDURE — 3075F SYST BP GE 130 - 139MM HG: CPT | Performed by: INTERNAL MEDICINE

## 2024-08-03 PROCEDURE — 99214 OFFICE O/P EST MOD 30 MIN: CPT | Performed by: INTERNAL MEDICINE

## 2024-08-03 PROCEDURE — 83036 HEMOGLOBIN GLYCOSYLATED A1C: CPT

## 2024-08-03 PROCEDURE — 3048F LDL-C <100 MG/DL: CPT | Performed by: INTERNAL MEDICINE

## 2024-08-03 PROCEDURE — 84443 ASSAY THYROID STIM HORMONE: CPT

## 2024-08-03 PROCEDURE — 3078F DIAST BP <80 MM HG: CPT | Performed by: INTERNAL MEDICINE

## 2024-08-03 PROCEDURE — 80053 COMPREHEN METABOLIC PANEL: CPT

## 2024-08-03 PROCEDURE — 3051F HG A1C>EQUAL 7.0%<8.0%: CPT | Performed by: INTERNAL MEDICINE

## 2024-08-03 PROCEDURE — 3008F BODY MASS INDEX DOCD: CPT | Performed by: INTERNAL MEDICINE

## 2024-08-03 PROCEDURE — 85027 COMPLETE CBC AUTOMATED: CPT

## 2024-08-03 PROCEDURE — 80061 LIPID PANEL: CPT

## 2024-08-03 PROCEDURE — 4010F ACE/ARB THERAPY RXD/TAKEN: CPT | Performed by: INTERNAL MEDICINE

## 2024-08-03 RX ORDER — NABUMETONE 750 MG/1
750 TABLET, FILM COATED ORAL 2 TIMES DAILY
Qty: 60 TABLET | Refills: 0 | Status: SHIPPED | OUTPATIENT
Start: 2024-08-03

## 2024-08-03 RX ORDER — CYCLOBENZAPRINE HCL 10 MG
10 TABLET ORAL NIGHTLY PRN
Qty: 30 TABLET | Refills: 0 | Status: SHIPPED | OUTPATIENT
Start: 2024-08-03

## 2024-08-03 ASSESSMENT — ENCOUNTER SYMPTOMS
LOSS OF SENSATION IN FEET: 0
OCCASIONAL FEELINGS OF UNSTEADINESS: 0
DEPRESSION: 0

## 2024-08-03 NOTE — PROGRESS NOTES
"Subjective   Patient ID: Stefanie Garnett is a 69 y.o. female who presents for pain and swelling in left knee..    This 69-year-old black lady today came here for pain and swelling in left knee.  Three weeks ago she fell down.  She injured her knee.  She tried over-the-counter medications, not helping.  Pain is 7/10, throbbing in nature.  First thing in the morning it is hurting.  She did not see any doctor for this.  She was busy.    I have personally reviewed the patient's Past Medical History, Medications, Allergies, Social History, and Family History in the EMR.    Review of Systems   All other systems reviewed and are negative.    Objective   /76   Ht 1.626 m (5' 4\")   Wt 113 kg (250 lb)   BMI 42.91 kg/m²     Physical Exam  Vitals reviewed.   Cardiovascular:      Heart sounds: Normal heart sounds, S1 normal and S2 normal. No murmur heard.     No friction rub.   Pulmonary:      Effort: Pulmonary effort is normal.      Breath sounds: Normal breath sounds and air entry.   Abdominal:      Palpations: There is no hepatomegaly, splenomegaly or mass.   Musculoskeletal:      Left knee: Swelling present. Tenderness present.      Right lower leg: No edema.      Left lower leg: No edema.      Comments: Left knee, effusion present, very tender.  No calf tenderness.  Homans negative.   Lymphadenopathy:      Lower Body: No right inguinal adenopathy. No left inguinal adenopathy.   Neurological:      Cranial Nerves: Cranial nerves 2-12 are intact.      Sensory: No sensory deficit.      Motor: Motor function is intact.      Deep Tendon Reflexes: Reflexes are normal and symmetric.     LAB WORK: Laboratory testing discussed.    Assessment/Plan   Problem List Items Addressed This Visit             ICD-10-CM       Musculoskeletal and Injuries    Fall - Primary W19.XXXA     Other Visit Diagnoses         Codes    Left knee pain, unspecified chronicity     M25.562    Relevant Medications    nabumetone (Relafen) 750 mg " tablet    cyclobenzaprine (Flexeril) 10 mg tablet    Other Relevant Orders    XR knee left 4+ views    Referral to Physical Therapy    Swelling of left knee joint     M25.462    Relevant Medications    nabumetone (Relafen) 750 mg tablet    cyclobenzaprine (Flexeril) 10 mg tablet    Other Relevant Orders    XR knee left 4+ views    Referral to Physical Therapy    Essential (primary) hypertension     I10        1. Left knee from fall, injury.  I ordered x-ray of both knees for comparison.  Relafen, Flexeril, knee brace, physical therapy.  Follow-up with Dr. Lizz Lew in a couple of weeks.  2. Hypertension, seeing Dr. Lizz Lew.  She will see Dr. Lizz Lew.  3. Continue to follow.    Scribe Attestation  By signing my name below, IBlanca Scribe attest that this documentation has been prepared under the direction and in the presence of Valerio Lew MD.

## 2024-08-05 ENCOUNTER — HOSPITAL ENCOUNTER (OUTPATIENT)
Dept: RADIOLOGY | Facility: CLINIC | Age: 69
Discharge: HOME | End: 2024-08-05
Payer: COMMERCIAL

## 2024-08-05 DIAGNOSIS — M25.462 SWELLING OF LEFT KNEE JOINT: ICD-10-CM

## 2024-08-05 DIAGNOSIS — M25.562 LEFT KNEE PAIN, UNSPECIFIED CHRONICITY: ICD-10-CM

## 2024-08-05 PROCEDURE — 73562 X-RAY EXAM OF KNEE 3: CPT | Mod: LEFT SIDE | Performed by: STUDENT IN AN ORGANIZED HEALTH CARE EDUCATION/TRAINING PROGRAM

## 2024-08-05 PROCEDURE — 73562 X-RAY EXAM OF KNEE 3: CPT | Mod: LT

## 2024-08-10 ENCOUNTER — OFFICE VISIT (OUTPATIENT)
Dept: PRIMARY CARE | Facility: CLINIC | Age: 69
End: 2024-08-10
Payer: COMMERCIAL

## 2024-08-10 VITALS
WEIGHT: 250 LBS | SYSTOLIC BLOOD PRESSURE: 130 MMHG | HEIGHT: 64 IN | BODY MASS INDEX: 42.68 KG/M2 | DIASTOLIC BLOOD PRESSURE: 78 MMHG

## 2024-08-10 DIAGNOSIS — E78.49 OTHER HYPERLIPIDEMIA: ICD-10-CM

## 2024-08-10 DIAGNOSIS — M25.562 LEFT KNEE PAIN, UNSPECIFIED CHRONICITY: Primary | ICD-10-CM

## 2024-08-10 DIAGNOSIS — I10 BENIGN HYPERTENSION: ICD-10-CM

## 2024-08-10 DIAGNOSIS — E11.9 CONTROLLED TYPE 2 DIABETES MELLITUS WITHOUT COMPLICATION, WITHOUT LONG-TERM CURRENT USE OF INSULIN (MULTI): ICD-10-CM

## 2024-08-10 PROCEDURE — 3075F SYST BP GE 130 - 139MM HG: CPT | Performed by: INTERNAL MEDICINE

## 2024-08-10 PROCEDURE — 99214 OFFICE O/P EST MOD 30 MIN: CPT | Performed by: INTERNAL MEDICINE

## 2024-08-10 PROCEDURE — 1159F MED LIST DOCD IN RCRD: CPT | Performed by: INTERNAL MEDICINE

## 2024-08-10 PROCEDURE — 3048F LDL-C <100 MG/DL: CPT | Performed by: INTERNAL MEDICINE

## 2024-08-10 PROCEDURE — 3078F DIAST BP <80 MM HG: CPT | Performed by: INTERNAL MEDICINE

## 2024-08-10 PROCEDURE — 4010F ACE/ARB THERAPY RXD/TAKEN: CPT | Performed by: INTERNAL MEDICINE

## 2024-08-10 PROCEDURE — 3051F HG A1C>EQUAL 7.0%<8.0%: CPT | Performed by: INTERNAL MEDICINE

## 2024-08-10 PROCEDURE — 3008F BODY MASS INDEX DOCD: CPT | Performed by: INTERNAL MEDICINE

## 2024-08-10 ASSESSMENT — ENCOUNTER SYMPTOMS
LOSS OF SENSATION IN FEET: 0
DEPRESSION: 0
OCCASIONAL FEELINGS OF UNSTEADINESS: 0

## 2024-08-10 NOTE — PROGRESS NOTES
Subjective   Patient ID: Stefanie Garnett is a 69 y.o. female who presents for Follow-up.    Med Refill  Patient here for follow-up on x-ray.  Lost balance on the carpet of the stairs and fell on the left knee.  It is hurting  Follow-up on hypertension high cholesterol coronary artery disease    Past recap  Patient is here for follow-up  Had right shoulder surgery.  Doing better  Follow-up on hypertension high cholesterol coronary artery disease  GERD  Using CPAP for sleep apnea    Past recap  Patient is here for follow-up on Mounjaro  She is losing weight.  Feeling better.  She is on 7.5 mg right now  Her starting weight was 251 on 10/7/2020 she is 231  Going for shoulder surgery    Past recap  patient is here for follow-up on MRI of the right shoulder  She saw the orthopedic doctor and he said it is related to the neck  She went to see the C-spine doctor and MRIs ordered  She feels the pain is all in the shoulder.  She did the MRI  She is losing weight with Mounjaro she lost from 234 last time to 228  She is tolerating the dose not to stay on it.  She never felt this good     patient is here with complaints of having right shoulder pain.  On September 12 patient fell down backwards while trying to sit on the couch and she landed on the right shoulder.  She had x-ray done in the urgent care which was unremarkable.  She did massage  She is doing physical therapy but is not helping she is having difficult time raising her arm above the head   Patient is also here for refill on Mounjaro it is helping her wants to increase the dose      Past recap   patient is here for follow-up on obesity.  She also lives in her.  Lost 10 pounds.  Only side effect is constipation but taking over-the-counter stool softeners and helping       her patient had appointment with the plastic surgeon  She is going for breast reduction  She needs to lose some weight and she also needs mammogram  Mounjaro is helping to lose some weight she  lost 5 pounds  She wants to increase the dose as she is tolerating it well  She is having problems with constipation for Mounjaro she was in the urgent care  Because she slid down the stairs and hurt the right shoulder was not able to move x-ray was negative     patient here for follow-up  Did blood work  Got new CPAP machine  She is using her CPAP machine regularly  Follow-up on hypertension high cholesterol  Having a lot of acid  Not able to lose weight.  Does not follow diet  Does not exercise  Fell 2 weeks ago and hurting on the right shoulder.  Range of motion of the shoulder is normal  She is gaining weight and her breast size is getting weightbearing and causing her back pain wants with reduction surgerypast recap  Did pulmonary function test  Did 2D echo  She needs clearance from cardiologist for her work     Patient is here for follow-up on blood work  She quit taking her metformin  She quit using CPAP machine because of the cough  She did pulmonary function test but results are not available  She saw the allergist and sleep doctor who recommended her to wash the tubing to avoid coughing  Patient coughs only at night during the day she is fine  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  Patient has not lost weight  She has not changed her eating habits yet        Patient is here for follow-up on obstructive sleep apnea  She is using the CPAP machine it is helping her  Follow-up on hypertension high cholesterol coronary artery disease   needs medication refill  Not watching diet gained weight     Needs flu shot  Overdue for GYN exam  She is seriously considering gastric bypass surgery because she is not able to lose weight she wants insurance papers filled for her hospital stay for MI     She was not using her CPAP machine  Came to the hospital with chest pain and non-ST elevation MI had repeat cardiac catheter done did not show any significant blockage  Patient was  "treated with antibiotic and steroid and feeling better  Her blood pressure was high and Norvasc was started She has degenerative changes in the lower back so she cannot do heavy lifting     Review of Systems    Objective   /78   Ht 1.626 m (5' 4\")   Wt 113 kg (250 lb)   BMI 42.91 kg/m²     Physical Exam  Vitals reviewed.   Constitutional:       Appearance: Normal appearance. She is obese.   HENT:      Head: Normocephalic and atraumatic.      Right Ear: Tympanic membrane, ear canal and external ear normal.      Left Ear: Tympanic membrane, ear canal and external ear normal.      Nose: Nose normal.      Mouth/Throat:      Pharynx: Oropharynx is clear.   Eyes:      Extraocular Movements: Extraocular movements intact.      Conjunctiva/sclera: Conjunctivae normal.      Pupils: Pupils are equal, round, and reactive to light.   Cardiovascular:      Rate and Rhythm: Normal rate and regular rhythm.      Pulses: Normal pulses.      Heart sounds: Normal heart sounds.   Pulmonary:      Effort: Pulmonary effort is normal.      Breath sounds: Normal breath sounds.   Abdominal:      General: Abdomen is flat. Bowel sounds are normal.      Palpations: Abdomen is soft.   Musculoskeletal:         General: Tenderness present.      Cervical back: Normal range of motion and neck supple.      Comments: Right shoulder tenderness and limited range of motion   Skin:     General: Skin is warm and dry.   Neurological:      General: No focal deficit present.      Mental Status: She is alert and oriented to person, place, and time.   Psychiatric:         Mood and Affect: Mood normal.       Assessment/Plan   Problem List Items Addressed This Visit    None      past recap  1. CAD. Had NSTEMI August 2015. Catheter showed no definite high-grade culprit lesion that would necessitate intervention. There was some moderate disease in the distal aspect of the first marginal branch of the LCx, but otherwise the patient had very minimal CAD. " Echocardiogram done June 2017 showed an EF of 60-65%. Patient had repeat exercise nuclear stress test June 2017 which was negative for ischemia. Patient had again acute MI cardiac catheter did not show any blockage. MI was attributed to coronary spasm     2. Hypertension. Adequately controlled      3. Hyperlipidemia. . Continue atorvastatin 80 mg daily as well as Zetia 10 mg daily.     4. Obesity. Weight loss was encouraged. Refer to Dr. Skip Suarez for considering gastric bypass     5. Sleep apnea. Using CPAP     6. Carotid ultrasound. No hemodynamically significant stenosis when done June 2017.     7. Diabetes  A1c 6.4  Doing better  Continue Metformin     #8 acute sinusitis  Treat with Z-Tien     Flu shot given  Mammogram ordered  Meds refilled for 6 months          6/27/2023  Blood work reviewed  A1c 6.3 slightly up  Cholesterol under control  Patient is using CPAP with improvement in symptoms uses every night 4 to 6 hours  Patient is not able to lose weight again discussed various options  Encourage diet and exercise   10/7/2023  X-ray of the right shoulder not needed as joint has full range of motion  Treat with Flexeril and Voltaren gel topically  Refer to plastics for breast hypertrophy  Blood work reviewed  Diabetes under control at 6.2  Cholesterol under control  Blood pressure stable TSH is slightly out of range which has happened in the past  We will monitor for now  Discussed diet and exercise to lose weight  We will try Wegovy versus Mounjaro what ever insurance covers to help her lose weight  Using CPAP  Follow-up blood work in 3 months    10/31/2023  Will increase dose of Mounjaro  Again discussed diet and exercise  Mammogram ordered  Call us 100 twice a day for constipation  She had a fall she slid down the stairs went to the urgent care and her right shoulder she hurt but now she is able to move it  Examination is normal  Follow-up in a month    11/27/2023  Patient is tolerating medication well had  good response  Will increase the dose    1/2/2024  Patient probably has frozen shoulder  Will get MRI of the right shoulder  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    1/30/2024  MRI of the shoulder showed right shoulder supraspinatus full-thickness tear also shows tendinitis  Refer to orthopedic for further management  Refills given on Mounjaro 7.5  Continue diet and exercise  Blood work reviewed  A1c down to 5.8  ALT slightly elevated at 56  Continue diet exercise  Follow-up blood work in 3 months  Needs to repeat TSH   Follow-up in a month for Mounjaro    224  Will increase dose of 10 mg  Patient is tolerating no side effects  Losing weight  Still continue diet and exercise  Follow-up in a month    5/28/2024  Blood pressure stable  Patient is due for blood work she did not do the blood work  Blood work ordered  Medications refilled for 90 days  Follow-up after blood work  Again encourage patient to use Mounjaro. continue diet and exercise  Continue Mounjaro if it helps to lose weight  As long as she tolerates    8/10/2024  X-ray of the knee shows moderate arthritis  Full range of motion of the knee  No fracture  Knee brace  To physical therapy  Blood work reviewed  A1c gone up again to 7.3.  Patient does a lot of fluctuations up-and-down  Very hard to stick to the diet and exercise regimen  Blood pressure stable  Cholesterol very well-controlled  Continue Mounjaro  Follow-up blood work in 3 months

## 2024-08-15 ENCOUNTER — EVALUATION (OUTPATIENT)
Dept: PHYSICAL THERAPY | Facility: CLINIC | Age: 69
End: 2024-08-15
Payer: COMMERCIAL

## 2024-08-15 DIAGNOSIS — M25.569 KNEE PAIN: Primary | ICD-10-CM

## 2024-08-15 DIAGNOSIS — R26.2 DIFFICULTY WALKING DOWN STAIRS: ICD-10-CM

## 2024-08-15 PROCEDURE — 97161 PT EVAL LOW COMPLEX 20 MIN: CPT | Mod: GP | Performed by: PHYSICAL THERAPIST

## 2024-08-15 PROCEDURE — 97110 THERAPEUTIC EXERCISES: CPT | Mod: GP | Performed by: PHYSICAL THERAPIST

## 2024-08-15 ASSESSMENT — ENCOUNTER SYMPTOMS
OCCASIONAL FEELINGS OF UNSTEADINESS: 0
DEPRESSION: 0
LOSS OF SENSATION IN FEET: 0

## 2024-08-15 ASSESSMENT — PAIN SCALES - GENERAL: PAINLEVEL_OUTOF10: 0 - NO PAIN

## 2024-08-15 ASSESSMENT — PAIN - FUNCTIONAL ASSESSMENT: PAIN_FUNCTIONAL_ASSESSMENT: 0-10

## 2024-08-15 NOTE — PROGRESS NOTES
Physical Therapy  Physical Therapy Orthopedic Evaluation    Patient Name: Stefanie Garnett  MRN: [id number]  Today's Date: 8/15/2024  Time Calculation  Start Time: 0915  Stop Time: 1000  Time Calculation (min): 45 min  PT Evaluation Time Entry  PT Evaluation (Low) Time Entry: 28, PT Therapeutic Procedures Time Entry  Manual Therapy Time Entry: 5  Therapeutic Exercise Time Entry: 8,      Insurance:  Payor: [employer] / Plan: [employer] / Product Type: *No Product type* /   Number of Treatments Authorized: 1/40  Certification Period Start Date: 08/15/24  Certification Period End Date: 11/13/24    Current Problem  1. Knee pain  Follow Up In Physical Therapy      2. Difficulty walking down stairs  Follow Up In Physical Therapy          Precautions:   Precautions  STEADI Fall Risk Score (The score of 4 or more indicates an increased risk of falling): 2    Medical History Form: Reviewed (scanned into chart)    Subjective:   Subjective   General:  General  Reason for Referral: L knee pain  Referred By: Lizz Lew Md  Past Medical History Relevant to Rehab: Fall  General Comment: Reports fall about 1 month ago.    Pain:  Pain Assessment: 0-10  0-10 (Numeric) Pain Score: 0 - No pain  Pain Type:  (Tightness)    Relevant Information (PMH & Previous Tests/Imaging): (+) tricompartmental OA  Previous Interventions/Treatments: None    Prior Level of Function (PLOF)  Prior Function Per Pt/Caregiver Report  Level of [place]: Independent with ADLs and functional transfers, Independent with homemaking with ambulation  Patient previously independent with all ADLs    Patients Living Environment:   Home Living Comment: Lives with spouse in multi-level home.    Primary Language: English    Red Flags: Do you have any of the following? No  Fever/chills, unexplained weight changes, dizziness/fainting, unexplained change in bowel or bladder functions, unexplained malaise or muscle weakness, night pain/sweats,  numbness or tingling    Objective   Tender to palpation at calf near popliteal fossa.  Tender to palpation at anterior tibialis mm belly.   Hip AROM  Hip AROM WFL:  (ROM limited due to adiose tissue)  R hip flexion: (125°): 90  L hip flexion: (125°): 90  R hip abduction: (45°): 40  L hip abduction: (45°): 40  R hip extension: (10°): 0  L hip extension: (10°): 0  Specific Lower Extremity MMT   R Iliopsoas: (5/5): 4/5  L Iliopsoas: (5/5): 4/5  R Gluteals (prone): (5/5): NT  L Gluteals (prone): (5/5): NT  R Gluteals (sidelying): (5/5): 4/5  L Gluteals (sidelying): (5/5): 4/5  R knee flexion: (5/5): 5/5  L knee flexion: (5/5): 4+/5  R knee extension: (5/5): 5/5  L knee extension: (5/5): 4+/5    KNEE  Knee AROM  R knee flexion: (140°): 116 (soft tissue approximation)  L knee flexion: (140°): 118  R knee extension: (0°): 0  L knee extension: (0°): -5  Outcome Measures:    Other Measures  Lower Extremity Funtional Score (LEFS): 63/80=78.7%  SLS: 2 seconds L/R    EDUCATION: home exercise program, plan of care, activity modifications, pain management, and injury pathology  Outpatient Education  Education Comment: Access Code: [email or web address]  URL: [email or web address]  Date: 08/15/2024  Prepared by: Miguel Salgado    Exercises  - Supine Active Straight Leg Raise  - 1-2 x daily - 7 x weekly - 1 sets - 10 reps  - Gastroc Stretch on Wall  - 1-2 x daily - 7 x weekly - 1 sets - 3 reps - 15 hold  - Standing Hip Abduction with Counter Support  - 1-2 x daily - 7 x weekly - 1 sets - 10 reps    Assessment:  Assessment Comment: Patient arrives with primary complaint of L knee stiffness and intermittent pain. States she tends to scuff her L foot which has caused a fall which lead to her current knee problem. L posterior knee/calf tender near popliteal and at anterior tibialis muscle belly. Will benefit from therapy to address pain, weakness and tightness to avoid falls and reduce pain.    Clinical Presentation: Stable  and/or uncomplicated characteristics  Personal Factors: BMI > 40    Plan:  Treatment/Interventions: Dry needling, Education/ Instruction, Gait training, Manual therapy, Neuromuscular re-education, Taping techniques, Self care/ home management, Therapeutic activities, Therapeutic exercises  PT Plan: Skilled PT  PT Frequency: 1 time per week  Duration: 5-6 weeks  Onset Date: 06/13/24  Certification Period Start Date: 08/15/24  Certification Period End Date: 11/13/24  Number of Treatments Authorized: 1/40  Rehab Potential: Good  Plan of Care Agreement: Patient    Goals: Set and discussed today  Active       PT Problem       PT Goal 1       Start:  08/15/24    Expected End:  11/13/24       STG  1) Patient will improve LEFS score by 5 points in order to perform functional activities at home and in the community in 3 weeks.  2) Patient will be able to complete ADLs with pain and stiffness in knee less than 1/10 in 3 weeks.  3) Pt will tolerate terminal knee flexion an extension without sensation of tightness for safe ambulation in 3 weeks.   4) Patient will be independent with HEP to allow for continued improvement in daily tasks at home and in the community in 3 visits.   LTG  1) Patient will have >/=5/5 strength in hip musculature to aid in balance with ambulation on varied surfaces in community in 5-6 weeks  2) Patient will be able to advance L LE without scuffing to reduce fall risk when walking in 5-6 weeks. .  3) Patient will be able to perform >10 seconds of SLS on even ground in order to allow for safe ambulation on all levels and to reduce fall risk within the community in 5-6 weeks.   4) Patient will improve LEFS score >/=68/80 points in order to perform functional activities at home and in the community by discharge.              Plan of care was developed with input and agreement by the patient    Treatments:  Therapeutic Exercise  Therapeutic Exercise Activity 1: Supine SLR: x 10  Therapeutic Exercise  Activity 2: Standing Hip Abd: x 10  Therapeutic Exercise Activity 3: Standing calf stretch: 15 hold x 3 - B    Manual Therapy  Manual Therapy Activity 1: STM of posterior calf and anterior tib to reduce muscle tone and tightness x 5 min    Ambulatory Screenings Summary       Screening  Frequency  Date Last Completed   Spiritual and Cultural Beliefs   Screening  each visit or episode of care 6/24/2024   Falls Risk Screening  every ambulatory visit [unfilled]   Pain Screening  annually at primary care visit  10/30/2023   Domestic Violence screening  annually at primary care visit 6/24/2024   Elder Abuse Screening  annually at primary care visit    Depression Screening  annually in the primary care setting 2/22/2024   Suicide Risk Screening  annually in the primary care setting 3/6/2024   Nutrition and Food Insecurity   Screening  at least annually at primary care visit     Key Learner  annually in the primary care setting 6/24/2024   Drug Screen  6/24/2024 12:52 PM   Alcohol Screen  6/24/2024 12:52 PM   Advance Directive  6/24/2024         Miguel Salgado, PT          Physical Therapy  Physical Therapy Orthopedic Evaluation    Patient Name: Stefanie Garnett  MRN: [id number]  Today's Date: 8/15/2024  Time Calculation  Start Time: 0915  Stop Time: 1000  Time Calculation (min): 45 min  PT Evaluation Time Entry  PT Evaluation (Low) Time Entry: 28, PT Therapeutic Procedures Time Entry  Manual Therapy Time Entry: 5  Therapeutic Exercise Time Entry: 8,      Insurance:  Payor: [employer] / Plan: [employer] / Product Type: *No Product type* /   Number of Treatments Authorized: 1/40  Certification Period Start Date: 08/15/24  Certification Period End Date: 11/13/24    Current Problem  1. Knee pain  Follow Up In Physical Therapy      2. Difficulty walking down stairs  Follow Up In Physical Therapy          Precautions:   Precautions  STEADI Fall Risk Score (The score of 4 or more indicates an increased  risk of falling): 2    Medical History Form: Reviewed (scanned into chart)    Subjective:   Subjective   General:  General  Reason for Referral: L knee pain  Referred By: Lizz Lew Md  Past Medical History Relevant to Rehab: Fall  General Comment: Reports fall about 1 month ago.    Pain:  Pain Assessment: 0-10  0-10 (Numeric) Pain Score: 0 - No pain  Pain Type:  (Tightness)    Relevant Information (PMH & Previous Tests/Imaging): Tricompartmental OA  Previous Interventions/Treatments: None    Prior Level of Function (PLOF)  Prior Function Per Pt/Caregiver Report  Level of [place]: Independent with ADLs and functional transfers, Independent with homemaking with ambulation  Patient previously independent with all ADLs    Patients Living Environment:   Home Living Comment: Lives with spouse in multi-level home.    Primary Language: English    Red Flags: Do you have any of the following? No  Fever/chills, unexplained weight changes, dizziness/fainting, unexplained change in bowel or bladder functions, unexplained malaise or muscle weakness, night pain/sweats, numbness or tingling    Objective   HIP  Hip AROM  Hip AROM WFL:  (ROM limited due to adiose tissue)  R hip flexion: (125°): 90  L hip flexion: (125°): 90  R hip abduction: (45°): 40  L hip abduction: (45°): 40  R hip extension: (10°): 0  L hip extension: (10°): 0  Specific Lower Extremity MMT   R Iliopsoas: (5/5): 4/5  L Iliopsoas: (5/5): 4/5  R Gluteals (prone): (5/5): NT  L Gluteals (prone): (5/5): NT  R Gluteals (sidelying): (5/5): 4/5  L Gluteals (sidelying): (5/5): 4/5  R knee flexion: (5/5): 5/5  L knee flexion: (5/5): 4+/5  R knee extension: (5/5): 5/5  L knee extension: (5/5): 4+/5    KNEE  Knee AROM  R knee flexion: (140°): 116 (soft tissue approximation)  L knee flexion: (140°): 118  R knee extension: (0°): 0  L knee extension: (0°): -5    Outcome Measures:    Other Measures  Lower Extremity Funtional Score (LEFS): 63/80=78.7%    EDUCATION: home  exercise program, plan of care, activity modifications, pain management, and injury pathology  Outpatient Education  Education Comment: Access Code: [email or web address]  URL: [email or web address]  Date: 08/15/2024  Prepared by: Miguel Salgado    Exercises  - Supine Active Straight Leg Raise  - 1-2 x daily - 7 x weekly - 1 sets - 10 reps  - Gastroc Stretch on Wall  - 1-2 x daily - 7 x weekly - 1 sets - 3 reps - 15 hold  - Standing Hip Abduction with Counter Support  - 1-2 x daily - 7 x weekly - 1 sets - 10 reps    Assessment:  Assessment Comment: Patient arrives with primary complaint of L knee stiffness and intermittent pain. States she tends to scuff her L foot which has caused a fall which lead to her current knee problem. L posterior knee/calf tender near popliteal and at anterior tibialis muscle belly. Will benefit from therapy to address pain, weakness and tightness to avoid falls and reduce pain.    Clinical Presentation: Stable and/or uncomplicated characteristics  Personal Factors: None    Plan:  Treatment/Interventions: Dry needling, Education/ Instruction, Gait training, Manual therapy, Neuromuscular re-education, Taping techniques, Self care/ home management, Therapeutic activities, Therapeutic exercises  PT Plan: Skilled PT  PT Frequency: 1 time per week  Duration: 5-6 weeks  Onset Date: 06/13/24  Certification Period Start Date: 08/15/24  Certification Period End Date: 11/13/24  Number of Treatments Authorized: 1/40  Rehab Potential: Good  Plan of Care Agreement: Patient    Goals: Set and discussed today  Active       PT Problem       PT Goal 1       Start:  08/15/24    Expected End:  11/13/24       STG  1) Patient will improve LEFS score by 5 points in order to perform functional activities at home and in the community in 3 weeks.  2) Patient will be able to complete ADLs with pain and stiffness in knee less than 1/10 in 3 weeks.  3) Pt will tolerate terminal knee flexion an extension  without sensation of tightness for safe ambulation in 3 weeks.   4) Patient will be independent with HEP to allow for continued improvement in daily tasks at home and in the community in 3 visits.   LTG  1) Patient will have >/=5/5 strength in hip musculature to aid in balance with ambulation on varied surfaces in community in 5-6 weeks  2) Patient will be able to advance L LE without scuffing to reduce fall risk when walking in 5-6 weeks. .  3) Patient will be able to perform >10 seconds of SLS on even ground in order to allow for safe ambulation on all levels and to reduce fall risk within the community in 5-6 weeks.   4) Patient will improve LEFS score >/=68/80 points in order to perform functional activities at home and in the community by discharge.              Plan of care was developed with input and agreement by the patient    Treatments:  Therapeutic Exercise  Therapeutic Exercise Activity 1: Supine SLR: x 10  Therapeutic Exercise Activity 2: Standing Hip Abd: x 10  Therapeutic Exercise Activity 3: Standing calf stretch: 15 hold x 3 - B    Manual Therapy  Manual Therapy Activity 1: STM of posterior calf and anterior tib to reduce muscle tone and tightness x 5 min    Ambulatory Screenings Summary       Screening  Frequency  Date Last Completed   Spiritual and Cultural Beliefs   Screening  each visit or episode of care 6/24/2024   Falls Risk Screening  every ambulatory visit [unfilled]   Pain Screening  annually at primary care visit  10/30/2023   Domestic Violence screening  annually at primary care visit 6/24/2024   Elder Abuse Screening  annually at primary care visit    Depression Screening  annually in the primary care setting 2/22/2024   Suicide Risk Screening  annually in the primary care setting 3/6/2024   Nutrition and Food Insecurity   Screening  at least annually at primary care visit     Key Learner  annually in the primary care setting 6/24/2024   Drug Screen  6/24/2024 12:52 PM   Alcohol  Screen  6/24/2024 12:52 PM   Advance Directive  6/24/2024         Miguel Salgado, PT

## 2024-08-20 ENCOUNTER — APPOINTMENT (OUTPATIENT)
Dept: PRIMARY CARE | Facility: CLINIC | Age: 69
End: 2024-08-20
Payer: COMMERCIAL

## 2024-08-22 ENCOUNTER — OFFICE VISIT (OUTPATIENT)
Dept: CARDIOLOGY | Facility: CLINIC | Age: 69
End: 2024-08-22
Payer: COMMERCIAL

## 2024-08-22 VITALS
SYSTOLIC BLOOD PRESSURE: 119 MMHG | RESPIRATION RATE: 16 BRPM | BODY MASS INDEX: 43.54 KG/M2 | WEIGHT: 255 LBS | HEIGHT: 64 IN | OXYGEN SATURATION: 99 % | DIASTOLIC BLOOD PRESSURE: 67 MMHG | HEART RATE: 62 BPM

## 2024-08-22 DIAGNOSIS — E78.5 HYPERLIPIDEMIA, UNSPECIFIED: ICD-10-CM

## 2024-08-22 DIAGNOSIS — I10 ESSENTIAL (PRIMARY) HYPERTENSION: ICD-10-CM

## 2024-08-22 DIAGNOSIS — E78.5 HYPERLIPIDEMIA, UNSPECIFIED HYPERLIPIDEMIA TYPE: Primary | ICD-10-CM

## 2024-08-22 PROCEDURE — 3074F SYST BP LT 130 MM HG: CPT | Performed by: NURSE PRACTITIONER

## 2024-08-22 PROCEDURE — 3048F LDL-C <100 MG/DL: CPT | Performed by: NURSE PRACTITIONER

## 2024-08-22 PROCEDURE — 1036F TOBACCO NON-USER: CPT | Performed by: NURSE PRACTITIONER

## 2024-08-22 PROCEDURE — 99214 OFFICE O/P EST MOD 30 MIN: CPT | Performed by: NURSE PRACTITIONER

## 2024-08-22 PROCEDURE — 3051F HG A1C>EQUAL 7.0%<8.0%: CPT | Performed by: NURSE PRACTITIONER

## 2024-08-22 PROCEDURE — 3078F DIAST BP <80 MM HG: CPT | Performed by: NURSE PRACTITIONER

## 2024-08-22 PROCEDURE — 1159F MED LIST DOCD IN RCRD: CPT | Performed by: NURSE PRACTITIONER

## 2024-08-22 PROCEDURE — 4010F ACE/ARB THERAPY RXD/TAKEN: CPT | Performed by: NURSE PRACTITIONER

## 2024-08-22 PROCEDURE — 3008F BODY MASS INDEX DOCD: CPT | Performed by: NURSE PRACTITIONER

## 2024-08-22 PROCEDURE — 1160F RVW MEDS BY RX/DR IN RCRD: CPT | Performed by: NURSE PRACTITIONER

## 2024-08-22 ASSESSMENT — PATIENT HEALTH QUESTIONNAIRE - PHQ9
SUM OF ALL RESPONSES TO PHQ9 QUESTIONS 1 AND 2: 0
2. FEELING DOWN, DEPRESSED OR HOPELESS: NOT AT ALL
1. LITTLE INTEREST OR PLEASURE IN DOING THINGS: NOT AT ALL

## 2024-08-22 ASSESSMENT — ENCOUNTER SYMPTOMS
NEUROLOGICAL NEGATIVE: 1
RESPIRATORY NEGATIVE: 1
MUSCULOSKELETAL NEGATIVE: 1
CONSTITUTIONAL NEGATIVE: 1
GASTROINTESTINAL NEGATIVE: 1
CARDIOVASCULAR NEGATIVE: 1

## 2024-08-22 NOTE — PROGRESS NOTES
"Chief Complaint  Follow up    History Of Present Illness:    .Ms Garnett is here in follow up. Denies chest pain, sob, palpitations or pedal edema.  Did not start Repatha.  Can't afford monjaro and hasn't started it, but would like to, so will have her stop Zetia which is 28.00 mo, to afford monjaro.    Will again look into getting Repatha affordably.         Last Recorded Vitals:  Blood pressure 119/67, pulse 62, resp. rate 16, height 1.626 m (5' 4\"), weight 116 kg (255 lb), SpO2 99%.     Past Medical History:  Past Medical History:   Diagnosis Date    Arm pain     Arm swelling     Asymptomatic menopausal state     Back pain     Back pain     CAD (coronary artery disease)     CAD (coronary artery disease)     Carpal tunnel syndrome     Chest pain     Cough     CPAP (continuous positive airway pressure) dependence     Diabetes mellitus (Multi)     Dizziness     Elevated blood sugar     Exposure to COVID-19 virus     Fall     Floaters     GERD (gastroesophageal reflux disease)     High cholesterol     HPV in female     Hx of cardiac cath     Hyperlipidemia     Hypertension     Ingrown toenail     Knee pain     Left arm numbness     Left foot pain     Left leg swelling     Meralgia paraesthetica, left     Moderate persistent atopic asthma without complication ([facility])     Morbid obesity (Multi)     Numbness     Obesity     ANGELICA (obstructive sleep apnea)     Pain in arm, unspecified     Arm pain    Pain of lower extremity     Pain of upper extremity     Palpitations     Personal history of other medical treatment 04/03/2019    History of screening mammography    Positive TB test     Restrictive lung disease     Shoulder pain     Sleep apnea     Snoring     SOB (shortness of breath)     Tail bone pain     Vaginal Pap smear         Past Surgical History:  Past Surgical History:   Procedure Laterality Date    CARDIAC CATHETERIZATION      HYSTERECTOMY  07/30/2015    Hysterectomy    HYSTERECTOMY  09/04/2015    Hysterectomy    " TUBAL LIGATION  07/30/2015    Tubal Ligation    TUBAL LIGATION  09/04/2015    Tubal Ligation       Social History:  Social History     Socioeconomic History    Marital status:    Tobacco Use    Smoking status: Never     Passive exposure: Never    Smokeless tobacco: Never   Substance and Sexual Activity    Alcohol use: Not Currently     Comment: OCCASIONAL WINE    Drug use: Never    Sexual activity: Defer       Family History:  Family History   Problem Relation Name Age of Onset    Other (malignant neoplasm of urnary bladder) Mother      Other (cardiac disorder) Other      Other (diabetes mellitus) Other           Allergies:  Patient has no known allergies.    Outpatient Medications:  Current Outpatient Medications   Medication Sig Dispense Refill    amLODIPine (Norvasc) 5 mg tablet Take 1 tablet (5 mg) by mouth once daily. 90 tablet 0    aspirin 81 mg EC tablet Take 1 tablet (81 mg) by mouth once daily.      atorvastatin (Lipitor) 80 mg tablet Take 1 tablet (80 mg) by mouth once daily at bedtime. 90 tablet 0    cholecalciferol (Vitamin D-3) 50 MCG (2000 UT) tablet Take 1 tablet (2,000 Units) by mouth once daily.      ezetimibe (Zetia) 10 mg tablet Take 1 tablet (10 mg) by mouth once daily. 90 tablet 0    losartan (Cozaar) 100 mg tablet Take 1 tablet (100 mg) by mouth once daily.      metoprolol tartrate (Lopressor) 50 mg tablet Take 1 tablet by mouth 2 times a day. 180 tablet 0    nabumetone (Relafen) 750 mg tablet Take 1 tablet (750 mg) by mouth 2 times a day. (Patient taking differently: Take 0.5 tablets (375 mg) by mouth once daily. 1/2 tab at night) 60 tablet 0    omega 3-dha-epa-fish oil 360 mg-108 mg- 180 mg-1,200 mg capsule Take 1 capsule by mouth once daily.      pantoprazole (ProtoNix) 40 mg EC tablet Take 1 tablet (40 mg) by mouth once daily. 90 tablet 0    tirzepatide (Mounjaro) 10 mg/0.5 mL pen injector Inject 10 mg under the skin 1 (one) time per week. (Patient taking differently: Inject 10 mg  under the skin 1 (one) time per week. Hasn't started yet) 3 mL 0     No current facility-administered medications for this visit.        Physical Exam:  Cardiovascular:      PMI at left midclavicular line. Normal rate. Regular rhythm. Normal S1. Normal S2.       Murmurs: There is no murmur.      No gallop.  No click. No rub.   Pulses:     Intact distal pulses.   Edema:     Peripheral edema absent.         ROS:  Review of Systems   Constitutional: Negative.   Cardiovascular: Negative.    Respiratory: Negative.     Skin: Negative.    Musculoskeletal: Negative.    Gastrointestinal: Negative.    Genitourinary: Negative.    Neurological: Negative.           Last Labs:  CBC -  Lab Results   Component Value Date    WBC 6.8 08/03/2024    HGB 13.7 08/03/2024    HCT 41.5 08/03/2024    MCV 79 (L) 08/03/2024     08/03/2024       CMP -  Lab Results   Component Value Date    CALCIUM 9.9 08/03/2024    PROT 6.1 (L) 08/03/2024    ALBUMIN 3.9 08/03/2024    AST 21 08/03/2024    ALT 46 (H) 08/03/2024    ALKPHOS 88 08/03/2024    BILITOT 0.6 08/03/2024       LIPID PANEL -   Lab Results   Component Value Date    CHOL 161 08/03/2024    TRIG 124 08/03/2024    HDL 50.7 08/03/2024    CHHDL 3.2 08/03/2024    LDLF 84 09/23/2023    VLDL 25 08/03/2024    NHDL 110 08/03/2024       RENAL FUNCTION PANEL -   Lab Results   Component Value Date    GLUCOSE 123 (H) 08/03/2024     08/03/2024    K 4.5 08/03/2024     08/03/2024    CO2 31 08/03/2024    ANIONGAP 9 (L) 08/03/2024    BUN 10 08/03/2024    CREATININE 0.80 08/03/2024    CALCIUM 9.9 08/03/2024    ALBUMIN 3.9 08/03/2024        Lab Results   Component Value Date    HGBA1C 7.3 (H) 08/03/2024         Assessment/Plan   Problem List Items Addressed This Visit    None    1. CAD. Had NSTEMI August 2015. Catheter showed no definite high-grade culprit lesion that would necessitate intervention. There was some moderate disease in the distal aspect of the first marginal branch of the LCx,  but otherwise the patient had very minimal CAD. Echocardiogram done June 2017 showed an EF of 60-65%. Patient had repeat exercise nuclear stress test June 2017 which was negative for ischemia. Had repeat cardiac cath done 10/2019 in the setting of suspected NSTEMI which again showed minimal CAD. Repeat echo, 10/2019 showed EF 60-64%. 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 Patient had follow up echo 06/2022 which showed EF 60-65%.      2. Hypertension. Low readings on lisinoprilâ€“HCT 20â€“25 milligrams daily as well as metoprolol or tartrate 50 mg twice daily. Will stop lisinopril-hct in favor of simple lisinopril 20 mg daily. Due to nagging cough with order losartan 100 mg daily.      3. Hyperlipidemia. Last fasting lipid panel done 09/2022 shows cholesterol 140, HDL 41, LDL 76, triglyceride 112. Continue atorvastatin 80 mg daily as well as Zetia 10 mg daily. She has been compliant. Will look into Repatha.     4. Obesity. Weight loss was encouraged.     5. Sleep apnea. On CPAP.     6. Carotid ultrasound. No hemodynamically significant stenosis when done June 2017.     7. Hx of covid-19 vaccine #1 and #2. Had covid-19 12/2021.          Latanya Severino, DARREN-CNP

## 2024-08-24 RX ORDER — AMLODIPINE BESYLATE 5 MG/1
5 TABLET ORAL DAILY
Qty: 90 TABLET | Refills: 0 | Status: SHIPPED | OUTPATIENT
Start: 2024-08-24

## 2024-08-24 RX ORDER — METOPROLOL TARTRATE 50 MG/1
50 TABLET ORAL 2 TIMES DAILY
Qty: 180 TABLET | Refills: 0 | Status: SHIPPED | OUTPATIENT
Start: 2024-08-24

## 2024-08-24 RX ORDER — ATORVASTATIN CALCIUM 80 MG/1
80 TABLET, FILM COATED ORAL NIGHTLY
Qty: 90 TABLET | Refills: 0 | Status: SHIPPED | OUTPATIENT
Start: 2024-08-24

## 2024-08-25 DIAGNOSIS — E78.5 HYPERLIPIDEMIA, UNSPECIFIED: ICD-10-CM

## 2024-08-25 DIAGNOSIS — K21.9 GASTROESOPHAGEAL REFLUX DISEASE WITHOUT ESOPHAGITIS: ICD-10-CM

## 2024-08-25 RX ORDER — PANTOPRAZOLE SODIUM 40 MG/1
40 TABLET, DELAYED RELEASE ORAL DAILY
Qty: 90 TABLET | Refills: 0 | Status: SHIPPED | OUTPATIENT
Start: 2024-08-25

## 2024-08-26 RX ORDER — LOSARTAN POTASSIUM 100 MG/1
100 TABLET ORAL DAILY
Qty: 90 TABLET | Refills: 3 | Status: SHIPPED | OUTPATIENT
Start: 2024-08-26

## 2024-08-27 ENCOUNTER — TREATMENT (OUTPATIENT)
Dept: PHYSICAL THERAPY | Facility: CLINIC | Age: 69
End: 2024-08-27
Payer: COMMERCIAL

## 2024-08-27 DIAGNOSIS — R26.2 DIFFICULTY WALKING DOWN STAIRS: ICD-10-CM

## 2024-08-27 DIAGNOSIS — M25.569 KNEE PAIN: ICD-10-CM

## 2024-08-27 PROCEDURE — 97112 NEUROMUSCULAR REEDUCATION: CPT | Mod: GP,CQ

## 2024-08-27 PROCEDURE — 97110 THERAPEUTIC EXERCISES: CPT | Mod: GP,CQ

## 2024-08-27 ASSESSMENT — PAIN - FUNCTIONAL ASSESSMENT: PAIN_FUNCTIONAL_ASSESSMENT: 0-10

## 2024-08-27 ASSESSMENT — PAIN SCALES - GENERAL: PAINLEVEL_OUTOF10: 0 - NO PAIN

## 2024-08-27 NOTE — PROGRESS NOTES
"Physical Therapy Treatment    Patient Name: Stefanie Garnett  MRN: [id number]  Today's Date: 8/27/2024    Current Problem  Problem List Items Addressed This Visit    None  Visit Diagnoses         Codes    Knee pain     M25.569    Difficulty walking down stairs     R26.2            Insurance:  Payor: HTP / Plan: HTP / Product Type: *No Product type* /   Number of Treatments Authorized: 2/40  Certification Period Start Date: 08/15/24  Certification Period End Date: 11/13/24    Subjective   General  Reason for Referral: L knee pain  Referred By: Lizz Lew Md  Past Medical History Relevant to Rehab: Fall (REVIEWED MEDICAL HISTORY SM)  General Comment: PT STATES HER KNEE IS FEELING BETTER.  MAINLY FEELS STIFF.    Performing HEP?: Yes    Precautions     Pain  Pain Assessment: 0-10  0-10 (Numeric) Pain Score: 0 - No pain    Objective   General Observation  General Observation: CHALLENGED WITH BALANCE ACTIVITIES       Treatments:    Therapeutic Exercise  Therapeutic Exercise Activity 1: SCIFIT MAN L1 X 6 MIN  Therapeutic Exercise Activity 2: GASTROC STRETCH X 1 MIN  Therapeutic Exercise Activity 3: HEEL RAISES X 1 MIN  Therapeutic Exercise Activity 4: DYNAMICS MARCH, BUTT KICK, TIN SOLDIER  Therapeutic Exercise Activity 5: MINI SQUATS X 1 MIN  Therapeutic Exercise Activity 6: FOOTWORM ALONG // BAR X 1 MIN  Therapeutic Exercise Activity 8: HAM CURLS 50# X 1 MIN    Balance/Neuromuscular Re-Education  Balance/Neuromuscular Re-Education Activity 1: TILT BOARD BALANCING F/B, S/S X 2 MIN EACH // BAR  Balance/Neuromuscular Re-Education Activity 2: AIREX BEAM SIDE STEPS  // BAR X 2 MIN  Balance/Neuromuscular Re-Education Activity 3: 6\" FWD STEP UPS 2 X 1 MIN // BAR  Balance/Neuromuscular Re-Education Activity 4: SLS L/R X 1 MIN EACH  Balance/Neuromuscular Re-Education Activity 5: BOSU FWD LUNGE X 2 MIN // BAR                        OP EDUCATION:  Outpatient Education  Education Comment: Access " Code: [email or web address]  URL: [email or web address]  Date: 08/27/2024  Prepared by: Mukul Shah    Exercises  - Side Stepping with Resistance at Feet  - 1 x daily - 7 x weekly - 1 sets - 10-20 reps  - Mini Squat with Counter Support  - 1-2 x daily - 7 x weekly - 1 sets - 20 reps  - Single Leg Stance  - 1-2 x daily - 7 x weekly - 1 sets - 1 reps - 1 min hold    Assessment:  PT Assessment  Assessment Comment: PT NITHIN EX'S WELL.  SHE WAS CHALLENGED AND FATIGUED WITH TODAY'S THER EX AND BALANCE ACTIVITIES.  NO C/O PAIN DURING SESSION.    Plan:  OP PT Plan  Treatment/Interventions: Dry needling, Education/ Instruction, Gait training, Manual therapy, Neuromuscular re-education, Taping techniques, Self care/ home management, Therapeutic activities, Therapeutic exercises  PT Plan: Skilled PT  PT Frequency: 1 time per week  Duration: 5-6 weeks  Onset Date: 06/13/24  Certification Period Start Date: 08/15/24  Certification Period End Date: 11/13/24  Number of Treatments Authorized: 2/40  Rehab Potential: Good  Plan of Care Agreement: Patient    Goals:  Active       PT Problem       PT Goal 1       Start:  08/15/24    Expected End:  11/13/24       STG  1) Patient will improve LEFS score by 5 points in order to perform functional activities at home and in the community in 3 weeks.  2) Patient will be able to complete ADLs with pain and stiffness in knee less than 1/10 in 3 weeks.  3) Pt will tolerate terminal knee flexion an extension without sensation of tightness for safe ambulation in 3 weeks.   4) Patient will be independent with HEP to allow for continued improvement in daily tasks at home and in the community in 3 visits.   LTG  1) Patient will have >/=5/5 strength in hip musculature to aid in balance with ambulation on varied surfaces in community in 5-6 weeks  2) Patient will be able to advance L LE without scuffing to reduce fall risk when walking in 5-6 weeks. .  3) Patient will be able to perform >10  seconds of SLS on even ground in order to allow for safe ambulation on all levels and to reduce fall risk within the community in 5-6 weeks.   4) Patient will improve LEFS score >/=68/80 points in order to perform functional activities at home and in the community by discharge.               Time Calculation  Start Time: 0953  Stop Time: 1033  Time Calculation (min): 40 min  PT Therapeutic Procedures Time Entry  Neuromuscular Re-Education Time Entry: 13  Therapeutic Exercise Time Entry: 27,

## 2024-09-03 ENCOUNTER — APPOINTMENT (OUTPATIENT)
Dept: PHYSICAL THERAPY | Facility: CLINIC | Age: 69
End: 2024-09-03
Payer: COMMERCIAL

## 2024-09-11 ENCOUNTER — APPOINTMENT (OUTPATIENT)
Dept: PHYSICAL THERAPY | Facility: CLINIC | Age: 69
End: 2024-09-11
Payer: COMMERCIAL

## 2024-09-21 ENCOUNTER — APPOINTMENT (OUTPATIENT)
Dept: PRIMARY CARE | Facility: CLINIC | Age: 69
End: 2024-09-21
Payer: COMMERCIAL

## 2024-09-21 VITALS
SYSTOLIC BLOOD PRESSURE: 122 MMHG | HEIGHT: 64 IN | WEIGHT: 244 LBS | BODY MASS INDEX: 41.66 KG/M2 | DIASTOLIC BLOOD PRESSURE: 68 MMHG

## 2024-09-21 DIAGNOSIS — R74.8 ELEVATED LIVER ENZYMES: ICD-10-CM

## 2024-09-21 DIAGNOSIS — E11.9 TYPE 2 DIABETES MELLITUS WITHOUT COMPLICATION, WITHOUT LONG-TERM CURRENT USE OF INSULIN (MULTI): ICD-10-CM

## 2024-09-21 DIAGNOSIS — Z23 ENCOUNTER FOR IMMUNIZATION: ICD-10-CM

## 2024-09-21 PROCEDURE — 3078F DIAST BP <80 MM HG: CPT | Performed by: INTERNAL MEDICINE

## 2024-09-21 PROCEDURE — G0008 ADMIN INFLUENZA VIRUS VAC: HCPCS | Performed by: INTERNAL MEDICINE

## 2024-09-21 PROCEDURE — 90662 IIV NO PRSV INCREASED AG IM: CPT | Performed by: INTERNAL MEDICINE

## 2024-09-21 PROCEDURE — 3074F SYST BP LT 130 MM HG: CPT | Performed by: INTERNAL MEDICINE

## 2024-09-21 PROCEDURE — 3008F BODY MASS INDEX DOCD: CPT | Performed by: INTERNAL MEDICINE

## 2024-09-21 PROCEDURE — 4010F ACE/ARB THERAPY RXD/TAKEN: CPT | Performed by: INTERNAL MEDICINE

## 2024-09-21 PROCEDURE — 99213 OFFICE O/P EST LOW 20 MIN: CPT | Performed by: INTERNAL MEDICINE

## 2024-09-21 PROCEDURE — 3048F LDL-C <100 MG/DL: CPT | Performed by: INTERNAL MEDICINE

## 2024-09-21 PROCEDURE — 3051F HG A1C>EQUAL 7.0%<8.0%: CPT | Performed by: INTERNAL MEDICINE

## 2024-09-21 PROCEDURE — 1159F MED LIST DOCD IN RCRD: CPT | Performed by: INTERNAL MEDICINE

## 2024-09-21 RX ORDER — TIRZEPATIDE 10 MG/.5ML
10 INJECTION, SOLUTION SUBCUTANEOUS
Qty: 3 ML | Refills: 0 | Status: SHIPPED | OUTPATIENT
Start: 2024-09-22

## 2024-09-21 NOTE — PROGRESS NOTES
Subjective   Patient ID: Stefanie Garnett is a 69 y.o. female who presents for Follow-up and Med Refill.    Med Refill  Patient is here for follow-up on Mounjaro  Tolerating the diet wondering if she can increase the dose has no side effects lost 6 pounds    Patient here for follow-up on x-ray.  Lost balance on the carpet of the stairs and fell on the left knee.  It is hurting  Follow-up on hypertension high cholesterol coronary artery disease    Past recap  Patient is here for follow-up  Had right shoulder surgery.  Doing better  Follow-up on hypertension high cholesterol coronary artery disease  GERD  Using CPAP for sleep apnea    Past recap  Patient is here for follow-up on Mounjaro  She is losing weight.  Feeling better.  She is on 7.5 mg right now  Her starting weight was 251 on 10/7/2020 she is 231  Going for shoulder surgery    Past recap  patient is here for follow-up on MRI of the right shoulder  She saw the orthopedic doctor and he said it is related to the neck  She went to see the C-spine doctor and MRIs ordered  She feels the pain is all in the shoulder.  She did the MRI  She is losing weight with Mounjaro she lost from 234 last time to 228  She is tolerating the dose not to stay on it.  She never felt this good     patient is here with complaints of having right shoulder pain.  On September 12 patient fell down backwards while trying to sit on the couch and she landed on the right shoulder.  She had x-ray done in the urgent care which was unremarkable.  She did massage  She is doing physical therapy but is not helping she is having difficult time raising her arm above the head   Patient is also here for refill on Mounjaro it is helping her wants to increase the dose      Past recap   patient is here for follow-up on obesity.  She also lives in her.  Lost 10 pounds.  Only side effect is constipation but taking over-the-counter stool softeners and helping       her patient had appointment with the  plastic surgeon  She is going for breast reduction  She needs to lose some weight and she also needs mammogram  Mounjaro is helping to lose some weight she lost 5 pounds  She wants to increase the dose as she is tolerating it well  She is having problems with constipation for Mounjaro she was in the urgent care  Because she slid down the stairs and hurt the right shoulder was not able to move x-ray was negative     patient here for follow-up  Did blood work  Got new CPAP machine  She is using her CPAP machine regularly  Follow-up on hypertension high cholesterol  Having a lot of acid  Not able to lose weight.  Does not follow diet  Does not exercise  Fell 2 weeks ago and hurting on the right shoulder.  Range of motion of the shoulder is normal  She is gaining weight and her breast size is getting weightbearing and causing her back pain wants with reduction surgerypast recap  Did pulmonary function test  Did 2D echo  She needs clearance from cardiologist for her work     Patient is here for follow-up on blood work  She quit taking her metformin  She quit using CPAP machine because of the cough  She did pulmonary function test but results are not available  She saw the allergist and sleep doctor who recommended her to wash the tubing to avoid coughing  Patient coughs only at night during the day she is fine  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  Patient has not lost weight  She has not changed her eating habits yet        Patient is here for follow-up on obstructive sleep apnea  She is using the CPAP machine it is helping her  Follow-up on hypertension high cholesterol coronary artery disease   needs medication refill  Not watching diet gained weight     Needs flu shot  Overdue for GYN exam  She is seriously considering gastric bypass surgery because she is not able to lose weight she wants insurance papers filled for her hospital stay for MI     She was not using her CPAP  "machine  Came to the hospital with chest pain and non-ST elevation MI had repeat cardiac catheter done did not show any significant blockage  Patient was treated with antibiotic and steroid and feeling better  Her blood pressure was high and Norvasc was started She has degenerative changes in the lower back so she cannot do heavy lifting     Review of Systems    Objective   /68   Ht 1.626 m (5' 4\")   Wt 111 kg (244 lb)   BMI 41.88 kg/m²     Physical Exam  Vitals reviewed.   Constitutional:       Appearance: Normal appearance. She is obese.   HENT:      Head: Normocephalic and atraumatic.      Right Ear: Tympanic membrane, ear canal and external ear normal.      Left Ear: Tympanic membrane, ear canal and external ear normal.      Nose: Nose normal.      Mouth/Throat:      Pharynx: Oropharynx is clear.   Eyes:      Extraocular Movements: Extraocular movements intact.      Conjunctiva/sclera: Conjunctivae normal.      Pupils: Pupils are equal, round, and reactive to light.   Cardiovascular:      Rate and Rhythm: Normal rate and regular rhythm.      Pulses: Normal pulses.      Heart sounds: Normal heart sounds.   Pulmonary:      Effort: Pulmonary effort is normal.      Breath sounds: Normal breath sounds.   Abdominal:      General: Abdomen is flat. Bowel sounds are normal.      Palpations: Abdomen is soft.   Musculoskeletal:         General: Tenderness present.      Cervical back: Normal range of motion and neck supple.      Comments: Right shoulder tenderness and limited range of motion   Skin:     General: Skin is warm and dry.   Neurological:      General: No focal deficit present.      Mental Status: She is alert and oriented to person, place, and time.   Psychiatric:         Mood and Affect: Mood normal.       Assessment/Plan   Problem List Items Addressed This Visit    None  Visit Diagnoses         Codes    Encounter for immunization     Z23    Relevant Orders    Flu vaccine, trivalent, preservative free, " HIGH-DOSE, age 65y+ (Fluzone) (Completed)            past recap  1. CAD. Had NSTEMI August 2015. Catheter showed no definite high-grade culprit lesion that would necessitate intervention. There was some moderate disease in the distal aspect of the first marginal branch of the LCx, but otherwise the patient had very minimal CAD. Echocardiogram done June 2017 showed an EF of 60-65%. Patient had repeat exercise nuclear stress test June 2017 which was negative for ischemia. Patient had again acute MI cardiac catheter did not show any blockage. MI was attributed to coronary spasm     2. Hypertension. Adequately controlled      3. Hyperlipidemia. . Continue atorvastatin 80 mg daily as well as Zetia 10 mg daily.     4. Obesity. Weight loss was encouraged. Refer to Dr. Skip Suarez for considering gastric bypass     5. Sleep apnea. Using CPAP     6. Carotid ultrasound. No hemodynamically significant stenosis when done June 2017.     7. Diabetes  A1c 6.4  Doing better  Continue Metformin     #8 acute sinusitis  Treat with Z-Tien     Flu shot given  Mammogram ordered  Meds refilled for 6 months          6/27/2023  Blood work reviewed  A1c 6.3 slightly up  Cholesterol under control  Patient is using CPAP with improvement in symptoms uses every night 4 to 6 hours  Patient is not able to lose weight again discussed various options  Encourage diet and exercise   10/7/2023  X-ray of the right shoulder not needed as joint has full range of motion  Treat with Flexeril and Voltaren gel topically  Refer to plastics for breast hypertrophy  Blood work reviewed  Diabetes under control at 6.2  Cholesterol under control  Blood pressure stable TSH is slightly out of range which has happened in the past  We will monitor for now  Discussed diet and exercise to lose weight  We will try Wegovy versus Mounjaro what ever insurance covers to help her lose weight  Using CPAP  Follow-up blood work in 3 months    10/31/2023  Will increase dose of  Mounjaro  Again discussed diet and exercise  Mammogram ordered  Call us 100 twice a day for constipation  She had a fall she slid down the stairs went to the urgent care and her right shoulder she hurt but now she is able to move it  Examination is normal  Follow-up in a month    11/27/2023  Patient is tolerating medication well had good response  Will increase the dose    1/2/2024  Patient probably has frozen shoulder  Will get MRI of the right shoulder  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    1/30/2024  MRI of the shoulder showed right shoulder supraspinatus full-thickness tear also shows tendinitis  Refer to orthopedic for further management  Refills given on Mounjaro 7.5  Continue diet and exercise  Blood work reviewed  A1c down to 5.8  ALT slightly elevated at 56  Continue diet exercise  Follow-up blood work in 3 months  Needs to repeat TSH   Follow-up in a month for Mounjaro    224  Will increase dose of 10 mg  Patient is tolerating no side effects  Losing weight  Still continue diet and exercise  Follow-up in a month    5/28/2024  Blood pressure stable  Patient is due for blood work she did not do the blood work  Blood work ordered  Medications refilled for 90 days  Follow-up after blood work  Again encourage patient to use Mounjaro. continue diet and exercise  Continue Mounjaro if it helps to lose weight  As long as she tolerates    8/10/2024  X-ray of the knee shows moderate arthritis  Full range of motion of the knee  No fracture  Knee brace  To physical therapy  Blood work reviewed  A1c gone up again to 7.3.  Patient does a lot of fluctuations up-and-down  Very hard to stick to the diet and exercise regimen  Blood pressure stable  Cholesterol very well-controlled  Continue Mounjaro  Follow-up blood work in 3 months    9/21/2024  Will continue the same dose of Mounjaro as patient is losing  weight  Again discussed diet and exercise is the key factor  Blood pressure stable  Denies any symptoms of constipation  Take stool softener just to be safe to avoid constipation  Flu shot given

## 2024-09-23 ENCOUNTER — APPOINTMENT (OUTPATIENT)
Dept: ORTHOPEDIC SURGERY | Facility: CLINIC | Age: 69
End: 2024-09-23
Payer: COMMERCIAL

## 2024-09-23 DIAGNOSIS — M17.12 PRIMARY OSTEOARTHRITIS OF LEFT KNEE: Primary | ICD-10-CM

## 2024-09-23 PROCEDURE — 3051F HG A1C>EQUAL 7.0%<8.0%: CPT | Performed by: ORTHOPAEDIC SURGERY

## 2024-09-23 PROCEDURE — 99213 OFFICE O/P EST LOW 20 MIN: CPT | Performed by: ORTHOPAEDIC SURGERY

## 2024-09-23 PROCEDURE — 4010F ACE/ARB THERAPY RXD/TAKEN: CPT | Performed by: ORTHOPAEDIC SURGERY

## 2024-09-23 PROCEDURE — 1036F TOBACCO NON-USER: CPT | Performed by: ORTHOPAEDIC SURGERY

## 2024-09-23 PROCEDURE — 20611 DRAIN/INJ JOINT/BURSA W/US: CPT | Performed by: ORTHOPAEDIC SURGERY

## 2024-09-23 PROCEDURE — 1159F MED LIST DOCD IN RCRD: CPT | Performed by: ORTHOPAEDIC SURGERY

## 2024-09-23 PROCEDURE — 3048F LDL-C <100 MG/DL: CPT | Performed by: ORTHOPAEDIC SURGERY

## 2024-09-23 PROCEDURE — 1160F RVW MEDS BY RX/DR IN RCRD: CPT | Performed by: ORTHOPAEDIC SURGERY

## 2024-09-23 RX ORDER — TRIAMCINOLONE ACETONIDE 40 MG/ML
40 INJECTION, SUSPENSION INTRA-ARTICULAR; INTRAMUSCULAR
Status: COMPLETED | OUTPATIENT
Start: 2024-09-23 | End: 2024-09-23

## 2024-09-23 RX ORDER — LIDOCAINE HYDROCHLORIDE 10 MG/ML
3 INJECTION, SOLUTION INFILTRATION; PERINEURAL
Status: COMPLETED | OUTPATIENT
Start: 2024-09-23 | End: 2024-09-23

## 2024-09-23 ASSESSMENT — PAIN SCALES - GENERAL: PAINLEVEL_OUTOF10: 0 - NO PAIN

## 2024-09-23 ASSESSMENT — ENCOUNTER SYMPTOMS
SHORTNESS OF BREATH: 0
WHEEZING: 0
CHILLS: 0
FATIGUE: 0
FEVER: 0

## 2024-09-23 ASSESSMENT — PAIN - FUNCTIONAL ASSESSMENT: PAIN_FUNCTIONAL_ASSESSMENT: 0-10

## 2024-09-23 NOTE — PROGRESS NOTES
Reason for Appointment  No chief complaint on file.    History of Present Illness  Patient is a 69 y.o. female here today for follow-up evaluation of the right shoulder. We last saw her on 6/24/24 for a 4 months status post a right arthroscopic rotator cuff repair at that time she was doing very well overall and our plan for her was to have her increase activity and lifting with the arm. Today she is currently 7 months post. No other changes in past medical history, allergies, or medications.  She did have a recent fall onto her left knee and she has arthritis on the left knee and was sent in today for injection in the future I gave her Dr. Palumbo but we will give her 1 injection today she is complain of medial sided pain she has early arthritic change on x-ray.  Worse with walking on this no numbness      Past Medical History:   Diagnosis Date    Arm pain     Arm swelling     Asymptomatic menopausal state     Back pain     Back pain     CAD (coronary artery disease)     CAD (coronary artery disease)     Carpal tunnel syndrome     Chest pain     Cough     CPAP (continuous positive airway pressure) dependence     Diabetes mellitus (Multi)     Dizziness     Elevated blood sugar     Exposure to COVID-19 virus     Fall     Floaters     GERD (gastroesophageal reflux disease)     High cholesterol     HPV in female     Hx of cardiac cath     Hyperlipidemia     Hypertension     Ingrown toenail     Knee pain     Left arm numbness     Left foot pain     Left leg swelling     Meralgia paraesthetica, left     Moderate persistent atopic asthma without complication (HHS-HCC)     Morbid obesity (Multi)     Numbness     Obesity     ANGELICA (obstructive sleep apnea)     Pain in arm, unspecified     Arm pain    Pain of lower extremity     Pain of upper extremity     Palpitations     Personal history of other medical treatment 04/03/2019    History of screening mammography    Positive TB test     Restrictive lung disease      Shoulder pain     Sleep apnea     Snoring     SOB (shortness of breath)     Tail bone pain     Vaginal Pap smear        Past Surgical History:   Procedure Laterality Date    CARDIAC CATHETERIZATION      HYSTERECTOMY  07/30/2015    Hysterectomy    HYSTERECTOMY  09/04/2015    Hysterectomy    TUBAL LIGATION  07/30/2015    Tubal Ligation    TUBAL LIGATION  09/04/2015    Tubal Ligation       Medication Documentation Review Audit       Reviewed by Logan Rodriguez MA (Medical Assistant) on 09/21/24 at 0947      Medication Order Taking? Sig Documenting Provider Last Dose Status   amLODIPine (Norvasc) 5 mg tablet [id number]  Take 1 tablet (5 mg) by mouth once daily. Lizz Lew MD  Active   aspirin 81 mg EC tablet [id number] No Take 1 tablet (81 mg) by mouth once daily. Historical Provider, MD Taking Active   atorvastatin (Lipitor) 80 mg tablet [id number]  Take 1 tablet (80 mg) by mouth once daily at bedtime. Lizz Lew MD  Active   cholecalciferol (Vitamin D-3) 50 MCG (2000 UT) tablet [id number] No Take 1 tablet (2,000 Units) by mouth once daily. Historical Provider, MD Taking Active   losartan (Cozaar) 100 mg tablet [id number]  TAKE 1 TABLET DAILY. Latanya Severino, APRN-CNP  Active   metoprolol tartrate (Lopressor) 50 mg tablet [id number]  Take 1 tablet by mouth 2 times a day. Lizz Lew MD  Active   nabumetone (Relafen) 750 mg tablet [id number]  Take 1 tablet (750 mg) by mouth 2 times a day. Valerio Lew MD  Active   omega 3-dha-epa-fish oil 360 mg-108 mg- 180 mg-1,200 mg capsule [id number] No Take 1 capsule by mouth once daily. Historical Provider, MD Taking Active   pantoprazole (ProtoNix) 40 mg EC tablet [id number]  Take 1 tablet (40 mg) by mouth once daily. Lizz Lew MD  Active   tirzepatide (Mounjaro) 10 mg/0.5 mL pen injector [id number] No Inject 10 mg under the skin 1 (one) time per week.   Patient taking differently: Inject 10 mg under the skin 1 (one) time per week. Hasn't started yet    Lizz Lew MD Taking  Active                    No Known Allergies    Review of Systems   Constitutional:  Negative for chills, fatigue and fever.   Respiratory:  Negative for shortness of breath and wheezing.    Cardiovascular:  Negative for chest pain and leg swelling.       Exam   Exam reveals much improved shoulder range of motion doing well some mild bursitis symptoms on the left shoulder left knee shows medial joint line tenderness possible small effusion no signs of infection no instability no calf tenderness  Assessment   Left knee osteoarthritis  Plan     We injected the left knee sterilely under ultrasound guidance from an anterior medial approach.  No issues with the injection understands small risk of infection signs to look for.  We talked about scheduling a follow-up for left shoulder we will get left shoulder x-rays at that pointPatient ID: Stefanie Garnett is a 69 y.o. female.    L Inj/Asp: L knee on 9/23/2024 12:14 PM  Indications: pain  Details: 22 G needle, ultrasound-guided  Medications: 40 mg triamcinolone acetonide 40 mg/mL; 3 mL lidocaine 10 mg/mL (1 %)  Outcome: tolerated well, no immediate complications  Procedure, treatment alternatives, risks and benefits explained, specific risks discussed. Consent was given by the patient. Immediately prior to procedure a time out was called to verify the correct patient, procedure, equipment, support staff and site/side marked as required. Patient was prepped and draped in the usual sterile fashion.           I, Selena Bocanegra, attest that this documentation has been prepared under the direction and in the presence of Gume Fragoso MD. By signing below, IGume MD, personally performed the services described in this documentation. All medical record entries made by the scribe were at my direction and in my presence. I have reviewed the chart and agree that the record reflects my personal performance and is accurate and complete.

## 2024-09-24 ENCOUNTER — APPOINTMENT (OUTPATIENT)
Dept: PHYSICAL THERAPY | Facility: CLINIC | Age: 69
End: 2024-09-24
Payer: COMMERCIAL

## 2024-10-21 ENCOUNTER — APPOINTMENT (OUTPATIENT)
Dept: ORTHOPEDIC SURGERY | Facility: CLINIC | Age: 69
End: 2024-10-21
Payer: COMMERCIAL

## 2024-10-21 ENCOUNTER — HOSPITAL ENCOUNTER (OUTPATIENT)
Dept: RADIOLOGY | Facility: CLINIC | Age: 69
Discharge: HOME | End: 2024-10-21
Payer: COMMERCIAL

## 2024-10-21 DIAGNOSIS — M25.512 ACUTE PAIN OF LEFT SHOULDER: ICD-10-CM

## 2024-10-21 DIAGNOSIS — M25.819 SHOULDER IMPINGEMENT: ICD-10-CM

## 2024-10-21 DIAGNOSIS — M19.012 OSTEOARTHRITIS OF LEFT SHOULDER, UNSPECIFIED OSTEOARTHRITIS TYPE: ICD-10-CM

## 2024-10-21 DIAGNOSIS — M25.512 ACUTE PAIN OF LEFT SHOULDER: Primary | ICD-10-CM

## 2024-10-21 PROCEDURE — 1036F TOBACCO NON-USER: CPT | Performed by: ORTHOPAEDIC SURGERY

## 2024-10-21 PROCEDURE — 4010F ACE/ARB THERAPY RXD/TAKEN: CPT | Performed by: ORTHOPAEDIC SURGERY

## 2024-10-21 PROCEDURE — 1160F RVW MEDS BY RX/DR IN RCRD: CPT | Performed by: ORTHOPAEDIC SURGERY

## 2024-10-21 PROCEDURE — 73030 X-RAY EXAM OF SHOULDER: CPT | Mod: LEFT SIDE | Performed by: RADIOLOGY

## 2024-10-21 PROCEDURE — 1159F MED LIST DOCD IN RCRD: CPT | Performed by: ORTHOPAEDIC SURGERY

## 2024-10-21 PROCEDURE — 3048F LDL-C <100 MG/DL: CPT | Performed by: ORTHOPAEDIC SURGERY

## 2024-10-21 PROCEDURE — 1125F AMNT PAIN NOTED PAIN PRSNT: CPT | Performed by: ORTHOPAEDIC SURGERY

## 2024-10-21 PROCEDURE — 99213 OFFICE O/P EST LOW 20 MIN: CPT | Performed by: ORTHOPAEDIC SURGERY

## 2024-10-21 PROCEDURE — 3051F HG A1C>EQUAL 7.0%<8.0%: CPT | Performed by: ORTHOPAEDIC SURGERY

## 2024-10-21 PROCEDURE — 73030 X-RAY EXAM OF SHOULDER: CPT | Mod: LT

## 2024-10-21 ASSESSMENT — ENCOUNTER SYMPTOMS
SHORTNESS OF BREATH: 0
CHILLS: 0
ARTHRALGIAS: 1
WHEEZING: 0
BRUISES/BLEEDS EASILY: 0
FEVER: 0
FATIGUE: 0

## 2024-10-21 ASSESSMENT — PAIN SCALES - GENERAL: PAINLEVEL_OUTOF10: 1

## 2024-10-21 ASSESSMENT — PAIN - FUNCTIONAL ASSESSMENT: PAIN_FUNCTIONAL_ASSESSMENT: 0-10

## 2024-10-21 NOTE — PROGRESS NOTES
Reason for Appointment  Chief Complaint   Patient presents with    Left Shoulder - Pain     History of Present Illness  Patient is a 69 y.o. female here today for follow-up evaluation of a new problem of left shoulder pain. We last saw her on 9/23/24 for right shoulder pain and left knee pain and we gave her a left knee injection. X-rays today of the left shoulder were reviewed and show mild arthritis of the GH and AC joints. She reports her right shoulder is doing well with good ROM. Getting left shoulder pain, but reports after her injection in the knee the shoulder has done better. The shoulder used to bother her at night, but not anymore. She is taking tylenol and osteo bi-flex. No recent falls or injuries. No other changes in past medical history, allergies, or medications.      Past Medical History:   Diagnosis Date    Arm pain     Arm swelling     Asymptomatic menopausal state     Back pain     Back pain     CAD (coronary artery disease)     CAD (coronary artery disease)     Carpal tunnel syndrome     Chest pain     Cough     CPAP (continuous positive airway pressure) dependence     Diabetes mellitus (Multi)     Dizziness     Elevated blood sugar     Exposure to COVID-19 virus     Fall     Floaters     GERD (gastroesophageal reflux disease)     High cholesterol     HPV in female     Hx of cardiac cath     Hyperlipidemia     Hypertension     Ingrown toenail     Knee pain     Left arm numbness     Left foot pain     Left leg swelling     Meralgia paraesthetica, left     Moderate persistent atopic asthma without complication ([facility])     Morbid obesity (Multi)     Numbness     Obesity     ANGELICA (obstructive sleep apnea)     Pain in arm, unspecified     Arm pain    Pain of lower extremity     Pain of upper extremity     Palpitations     Personal history of other medical treatment 04/03/2019    History of screening mammography    Positive TB test     Restrictive lung disease     Shoulder pain     Sleep apnea      Snoring     SOB (shortness of breath)     Tail bone pain     Vaginal Pap smear        Past Surgical History:   Procedure Laterality Date    CARDIAC CATHETERIZATION      HYSTERECTOMY  07/30/2015    Hysterectomy    HYSTERECTOMY  09/04/2015    Hysterectomy    TUBAL LIGATION  07/30/2015    Tubal Ligation    TUBAL LIGATION  09/04/2015    Tubal Ligation       Medication Documentation Review Audit       Reviewed by Diane Nair MA (Medical Assistant) on 10/21/24 at 1005      Medication Order Taking? Sig Documenting Provider Last Dose Status   amLODIPine (Norvasc) 5 mg tablet [id number] Yes Take 1 tablet (5 mg) by mouth once daily. Lizz Lew MD  Active   aspirin 81 mg EC tablet [id number] Yes Take 1 tablet (81 mg) by mouth once daily. Historical Provider, MD  Active   atorvastatin (Lipitor) 80 mg tablet [id number] Yes Take 1 tablet (80 mg) by mouth once daily at bedtime. Lizz Lew MD  Active   cholecalciferol (Vitamin D-3) 50 MCG (2000 UT) tablet [id number] Yes Take 1 tablet (2,000 Units) by mouth once daily. Historical Provider, MD  Active   losartan (Cozaar) 100 mg tablet [id number] Yes TAKE 1 TABLET DAILY. Latanya Severino, APRN-CNP  Active   metoprolol tartrate (Lopressor) 50 mg tablet [id number] Yes Take 1 tablet by mouth 2 times a day. Lizz Lew MD  Active   nabumetone (Relafen) 750 mg tablet [id number] Yes Take 1 tablet (750 mg) by mouth 2 times a day. Valerio Lew MD  Active   omega 3-dha-epa-fish oil 360 mg-108 mg- 180 mg-1,200 mg capsule [id number] Yes Take 1 capsule by mouth once daily. Historical Provider, MD  Active   pantoprazole (ProtoNix) 40 mg EC tablet [id number] Yes Take 1 tablet (40 mg) by mouth once daily. Lizz Lew MD  Active   tirzepatide (Mounjaro) 10 mg/0.5 mL pen injector [id number] Yes Inject 10 mg under the skin 1 (one) time per week. Lizz Lew MD  Active                    No Known Allergies    Review of Systems   Constitutional:  Negative for chills, fatigue and fever.   Respiratory:   Negative for shortness of breath and wheezing.    Cardiovascular:  Negative for chest pain and leg swelling.   Musculoskeletal:  Positive for arthralgias.   Allergic/Immunologic: Negative for immunocompromised state.   Hematological:  Does not bruise/bleed easily.       Exam   Pt is alert awake, orientated to person place and time. No acute distress. Mood is good. Good pulses and good sensation. Right shoulder good ROM and cuff strength. Mildly positive impingement sign and good cuff strength, left shoulder.     Assessment   Encounter Diagnosis   Name Primary?    Acute pain of left shoulder Yes   Osteoarthritis, left shoulder  Impingement, left shoulder    Plan     At this point intervention is not warranted. We dicussed ways to prevent flare ups. We did discuss a future injection. Follow up as needed.        I, Selena Bocanegra, attest that this documentation has been prepared under the direction and in the presence of Gume Fragoso MD. By signing below, I, Gume Fragoso MD, personally performed the services described in this documentation. All medical record entries made by the scribe were at my direction and in my presence. I have reviewed the chart and agree that the record reflects my personal performance and is accurate and complete.

## 2024-11-07 NOTE — PROGRESS NOTES
Subjective   Patient ID: Stefanie Garnett is a 68 y.o. female who presents for Follow-up.    Med Refill    Patient is here for follow-up on MRI of the right shoulder  She saw the orthopedic doctor and he said it is related to the neck  She went to see the C-spine doctor and MRIs ordered  She feels the pain is all in the shoulder.  She did the MRI  She is losing weight with Mounjaro she lost from 234 last time to 228  She is tolerating the dose not to stay on it.  She never felt this good       Past recap   patient is here with complaints of having right shoulder pain.  On September 12 patient fell down backwards while trying to sit on the couch and she landed on the right shoulder.  She had x-ray done in the urgent care which was unremarkable.  She did massage  She is doing physical therapy but is not helping she is having difficult time raising her arm above the head   Patient is also here for refill on Mounjaro it is helping her wants to increase the dose      Past recap   patient is here for follow-up on obesity.  She also lives in her.  Lost 10 pounds.  Only side effect is constipation but taking over-the-counter stool softeners and helping       her patient had appointment with the plastic surgeon  She is going for breast reduction  She needs to lose some weight and she also needs mammogram  Mounjaro is helping to lose some weight she lost 5 pounds  She wants to increase the dose as she is tolerating it well  She is having problems with constipation for Mounjaro she was in the urgent care  Because she slid down the stairs and hurt the right shoulder was not able to move x-ray was negative     patient here for follow-up  Did blood work  Got new CPAP machine  She is using her CPAP machine regularly  Follow-up on hypertension high cholesterol  Having a lot of acid  Not able to lose weight.  Does not follow diet  Does not exercise  Fell 2 weeks ago and hurting on the right shoulder.  Range of motion of the  "shoulder is normal  She is gaining weight and her breast size is getting weightbearing and causing her back pain wants with reduction surgerypast recap  Did pulmonary function test  Did 2D echo  She needs clearance from cardiologist for her work     Patient is here for follow-up on blood work  She quit taking her metformin  She quit using CPAP machine because of the cough  She did pulmonary function test but results are not available  She saw the allergist and sleep doctor who recommended her to wash the tubing to avoid coughing  Patient coughs only at night during the day she is fine  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  Patient has not lost weight  She has not changed her eating habits yet        Patient is here for follow-up on obstructive sleep apnea  She is using the CPAP machine it is helping her  Follow-up on hypertension high cholesterol coronary artery disease   needs medication refill  Not watching diet gained weight     Needs flu shot  Overdue for GYN exam  She is seriously considering gastric bypass surgery because she is not able to lose weight she wants insurance papers filled for her hospital stay for MI     She was not using her CPAP machine  Came to the hospital with chest pain and non-ST elevation MI had repeat cardiac catheter done did not show any significant blockage  Patient was treated with antibiotic and steroid and feeling better  Her blood pressure was high and Norvasc was started She has degenerative changes in the lower back so she cannot do heavy lifting     Review of Systems    Objective   /66   Ht 1.626 m (5' 4\")   Wt 103 kg (228 lb)   BMI 39.14 kg/m²     Physical Exam  Vitals reviewed.   Constitutional:       Appearance: Normal appearance. She is obese.   HENT:      Head: Normocephalic and atraumatic.      Right Ear: Tympanic membrane, ear canal and external ear normal.      Left Ear: Tympanic membrane, ear canal and external ear " normal.      Nose: Nose normal.      Mouth/Throat:      Pharynx: Oropharynx is clear.   Eyes:      Extraocular Movements: Extraocular movements intact.      Conjunctiva/sclera: Conjunctivae normal.      Pupils: Pupils are equal, round, and reactive to light.   Cardiovascular:      Rate and Rhythm: Normal rate and regular rhythm.      Pulses: Normal pulses.      Heart sounds: Normal heart sounds.   Pulmonary:      Effort: Pulmonary effort is normal.      Breath sounds: Normal breath sounds.   Abdominal:      General: Abdomen is flat. Bowel sounds are normal.      Palpations: Abdomen is soft.   Musculoskeletal:         General: Tenderness present.      Cervical back: Normal range of motion and neck supple.      Comments: Right shoulder tenderness and limited range of motion   Skin:     General: Skin is warm and dry.   Neurological:      General: No focal deficit present.      Mental Status: She is alert and oriented to person, place, and time.   Psychiatric:         Mood and Affect: Mood normal.         Assessment/Plan   Problem List Items Addressed This Visit             ICD-10-CM       Endocrine/Metabolic    Diabetes mellitus (CMS/[facility]) E11.9    Relevant Medications    tirzepatide (Mounjaro) 7.5 mg/0.5 mL pen injector    Morbid obesity (CMS/[facility]) - Primary E66.01     Other Visit Diagnoses         Codes    Nontraumatic tear of right supraspinatus tendon     M75.101    Relevant Orders    Referral to Orthopaedic Surgery        past recap  1. CAD. Had NSTEMI August 2015. Catheter showed no definite high-grade culprit lesion that would necessitate intervention. There was some moderate disease in the distal aspect of the first marginal branch of the LCx, but otherwise the patient had very minimal CAD. Echocardiogram done June 2017 showed an EF of 60-65%. Patient had repeat exercise nuclear stress test June 2017 which was negative for ischemia. Patient had again acute MI cardiac catheter did not show any blockage. MI was  attributed to coronary spasm     2. Hypertension. Adequately controlled      3. Hyperlipidemia. . Continue atorvastatin 80 mg daily as well as Zetia 10 mg daily.     4. Obesity. Weight loss was encouraged. Refer to Dr. Skip Suarez for considering gastric bypass     5. Sleep apnea. Using CPAP     6. Carotid ultrasound. No hemodynamically significant stenosis when done June 2017.     7. Diabetes  A1c 6.4  Doing better  Continue Metformin     #8 acute sinusitis  Treat with Z-Tien     Flu shot given  Mammogram ordered  Meds refilled for 6 months          6/27/2023  Blood work reviewed  A1c 6.3 slightly up  Cholesterol under control  Patient is using CPAP with improvement in symptoms uses every night 4 to 6 hours  Patient is not able to lose weight again discussed various options  Encourage diet and exercise   10/7/2023  X-ray of the right shoulder not needed as joint has full range of motion  Treat with Flexeril and Voltaren gel topically  Refer to plastics for breast hypertrophy  Blood work reviewed  Diabetes under control at 6.2  Cholesterol under control  Blood pressure stable TSH is slightly out of range which has happened in the past  We will monitor for now  Discussed diet and exercise to lose weight  We will try Wegovy versus Mounjaro what ever insurance covers to help her lose weight  Using CPAP  Follow-up blood work in 3 months    10/31/2023  Will increase dose of Mounjaro  Again discussed diet and exercise  Mammogram ordered  Call us 100 twice a day for constipation  She had a fall she slid down the stairs went to the urgent care and her right shoulder she hurt but now she is able to move it  Examination is normal  Follow-up in a month    11/27/2023  Patient is tolerating medication well had good response  Will increase the dose    1/2/2024  Patient probably has frozen shoulder  Will get MRI of the right shoulder  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

## 2024-11-15 ENCOUNTER — LAB (OUTPATIENT)
Dept: LAB | Facility: LAB | Age: 69
End: 2024-11-15
Payer: COMMERCIAL

## 2024-11-15 DIAGNOSIS — I10 BENIGN HYPERTENSION: ICD-10-CM

## 2024-11-15 DIAGNOSIS — E11.9 CONTROLLED TYPE 2 DIABETES MELLITUS WITHOUT COMPLICATION, WITHOUT LONG-TERM CURRENT USE OF INSULIN (MULTI): ICD-10-CM

## 2024-11-15 DIAGNOSIS — E78.49 OTHER HYPERLIPIDEMIA: ICD-10-CM

## 2024-11-15 LAB
ALBUMIN SERPL BCP-MCNC: 4.1 G/DL (ref 3.4–5)
ALP SERPL-CCNC: 92 U/L (ref 33–136)
ALT SERPL W P-5'-P-CCNC: 62 U/L (ref 7–45)
ANION GAP SERPL CALC-SCNC: 13 MMOL/L (ref 10–20)
AST SERPL W P-5'-P-CCNC: 27 U/L (ref 9–39)
BILIRUB SERPL-MCNC: 0.9 MG/DL (ref 0–1.2)
BUN SERPL-MCNC: 11 MG/DL (ref 6–23)
CALCIUM SERPL-MCNC: 9.8 MG/DL (ref 8.6–10.6)
CHLORIDE SERPL-SCNC: 104 MMOL/L (ref 98–107)
CHOLEST SERPL-MCNC: 131 MG/DL (ref 0–199)
CHOLESTEROL/HDL RATIO: 2.9
CO2 SERPL-SCNC: 28 MMOL/L (ref 21–32)
CREAT SERPL-MCNC: 0.81 MG/DL (ref 0.5–1.05)
EGFRCR SERPLBLD CKD-EPI 2021: 80 ML/MIN/1.73M*2
ERYTHROCYTE [DISTWIDTH] IN BLOOD BY AUTOMATED COUNT: 16 % (ref 11.5–14.5)
EST. AVERAGE GLUCOSE BLD GHB EST-MCNC: 120 MG/DL
GLUCOSE SERPL-MCNC: 77 MG/DL (ref 74–99)
HBA1C MFR BLD: 5.8 %
HCT VFR BLD AUTO: 43.9 % (ref 36–46)
HDLC SERPL-MCNC: 44.5 MG/DL
HGB BLD-MCNC: 14.5 G/DL (ref 12–16)
LDLC SERPL CALC-MCNC: 72 MG/DL
MCH RBC QN AUTO: 26.4 PG (ref 26–34)
MCHC RBC AUTO-ENTMCNC: 33 G/DL (ref 32–36)
MCV RBC AUTO: 80 FL (ref 80–100)
NON HDL CHOLESTEROL: 87 MG/DL (ref 0–149)
NRBC BLD-RTO: 0 /100 WBCS (ref 0–0)
PLATELET # BLD AUTO: 206 X10*3/UL (ref 150–450)
POTASSIUM SERPL-SCNC: 4.4 MMOL/L (ref 3.5–5.3)
PROT SERPL-MCNC: 6.4 G/DL (ref 6.4–8.2)
RBC # BLD AUTO: 5.49 X10*6/UL (ref 4–5.2)
SODIUM SERPL-SCNC: 141 MMOL/L (ref 136–145)
TRIGL SERPL-MCNC: 73 MG/DL (ref 0–149)
VLDL: 15 MG/DL (ref 0–40)
WBC # BLD AUTO: 7.6 X10*3/UL (ref 4.4–11.3)

## 2024-11-15 PROCEDURE — 80053 COMPREHEN METABOLIC PANEL: CPT

## 2024-11-15 PROCEDURE — 80061 LIPID PANEL: CPT

## 2024-11-15 PROCEDURE — 83036 HEMOGLOBIN GLYCOSYLATED A1C: CPT

## 2024-11-15 PROCEDURE — 36415 COLL VENOUS BLD VENIPUNCTURE: CPT

## 2024-11-15 PROCEDURE — 85027 COMPLETE CBC AUTOMATED: CPT

## 2024-11-16 ENCOUNTER — APPOINTMENT (OUTPATIENT)
Dept: PRIMARY CARE | Facility: CLINIC | Age: 69
End: 2024-11-16
Payer: COMMERCIAL

## 2024-11-20 ENCOUNTER — APPOINTMENT (OUTPATIENT)
Dept: ALLERGY | Facility: CLINIC | Age: 69
End: 2024-11-20
Payer: COMMERCIAL

## 2024-11-20 DIAGNOSIS — K21.9 GASTROESOPHAGEAL REFLUX DISEASE WITHOUT ESOPHAGITIS: ICD-10-CM

## 2024-11-21 RX ORDER — PANTOPRAZOLE SODIUM 40 MG/1
40 TABLET, DELAYED RELEASE ORAL DAILY
Qty: 90 TABLET | Refills: 0 | Status: SHIPPED | OUTPATIENT
Start: 2024-11-21

## 2024-11-25 ENCOUNTER — APPOINTMENT (OUTPATIENT)
Dept: PRIMARY CARE | Facility: CLINIC | Age: 69
End: 2024-11-25
Payer: COMMERCIAL

## 2024-11-25 VITALS
WEIGHT: 245 LBS | DIASTOLIC BLOOD PRESSURE: 68 MMHG | BODY MASS INDEX: 41.83 KG/M2 | HEIGHT: 64 IN | SYSTOLIC BLOOD PRESSURE: 126 MMHG

## 2024-11-25 DIAGNOSIS — E78.5 HYPERLIPIDEMIA, UNSPECIFIED: ICD-10-CM

## 2024-11-25 DIAGNOSIS — E11.9 TYPE 2 DIABETES MELLITUS WITHOUT COMPLICATION, WITHOUT LONG-TERM CURRENT USE OF INSULIN (MULTI): ICD-10-CM

## 2024-11-25 DIAGNOSIS — Z12.31 ENCOUNTER FOR SCREENING MAMMOGRAM FOR MALIGNANT NEOPLASM OF BREAST: ICD-10-CM

## 2024-11-25 DIAGNOSIS — I10 ESSENTIAL (PRIMARY) HYPERTENSION: ICD-10-CM

## 2024-11-25 DIAGNOSIS — K21.9 GASTROESOPHAGEAL REFLUX DISEASE WITHOUT ESOPHAGITIS: ICD-10-CM

## 2024-11-25 DIAGNOSIS — Z23 ENCOUNTER FOR IMMUNIZATION: ICD-10-CM

## 2024-11-25 PROCEDURE — 1159F MED LIST DOCD IN RCRD: CPT | Performed by: INTERNAL MEDICINE

## 2024-11-25 PROCEDURE — 4010F ACE/ARB THERAPY RXD/TAKEN: CPT | Performed by: INTERNAL MEDICINE

## 2024-11-25 PROCEDURE — 1124F ACP DISCUSS-NO DSCNMKR DOCD: CPT | Performed by: INTERNAL MEDICINE

## 2024-11-25 PROCEDURE — 3074F SYST BP LT 130 MM HG: CPT | Performed by: INTERNAL MEDICINE

## 2024-11-25 PROCEDURE — 3008F BODY MASS INDEX DOCD: CPT | Performed by: INTERNAL MEDICINE

## 2024-11-25 PROCEDURE — 3044F HG A1C LEVEL LT 7.0%: CPT | Performed by: INTERNAL MEDICINE

## 2024-11-25 PROCEDURE — 99214 OFFICE O/P EST MOD 30 MIN: CPT | Performed by: INTERNAL MEDICINE

## 2024-11-25 PROCEDURE — 3048F LDL-C <100 MG/DL: CPT | Performed by: INTERNAL MEDICINE

## 2024-11-25 PROCEDURE — 3078F DIAST BP <80 MM HG: CPT | Performed by: INTERNAL MEDICINE

## 2024-11-25 RX ORDER — TIRZEPATIDE 10 MG/.5ML
10 INJECTION, SOLUTION SUBCUTANEOUS
Qty: 3 ML | Refills: 0 | Status: SHIPPED | OUTPATIENT
Start: 2024-11-25

## 2024-11-25 RX ORDER — PANTOPRAZOLE SODIUM 40 MG/1
40 TABLET, DELAYED RELEASE ORAL DAILY
Qty: 90 TABLET | Refills: 0 | Status: SHIPPED | OUTPATIENT
Start: 2024-11-25

## 2024-11-25 RX ORDER — METOPROLOL TARTRATE 50 MG/1
50 TABLET ORAL 2 TIMES DAILY
Qty: 180 TABLET | Refills: 0 | Status: SHIPPED | OUTPATIENT
Start: 2024-11-25

## 2024-11-25 RX ORDER — AMLODIPINE BESYLATE 5 MG/1
5 TABLET ORAL DAILY
Qty: 90 TABLET | Refills: 0 | Status: SHIPPED | OUTPATIENT
Start: 2024-11-25

## 2024-11-25 RX ORDER — ATORVASTATIN CALCIUM 80 MG/1
80 TABLET, FILM COATED ORAL NIGHTLY
Qty: 90 TABLET | Refills: 0 | Status: SHIPPED | OUTPATIENT
Start: 2024-11-25

## 2024-12-01 NOTE — PROGRESS NOTES
Subjective   Patient ID: Stefanie Garnett is a 69 y.o. female who presents for Follow-up and Med Refill.    Med Refill    Patient here for follow-up on Mounjaro  No side effects tolerating   Losing weight     Past recap   patient is here for follow-up on Mounjaro  Tolerating the diet wondering if she can increase the dose has no side effects lost 6 pounds    Patient here for follow-up on x-ray.  Lost balance on the carpet of the stairs and fell on the left knee.  It is hurting  Follow-up on hypertension high cholesterol coronary artery disease    Patient is here for follow-up  Had right shoulder surgery.  Doing better  Follow-up on hypertension high cholesterol coronary artery disease  GERD  Using CPAP for sleep apnea    Patient is here for follow-up on Mounjaro  She is losing weight.  Feeling better.  She is on 7.5 mg right now  Her starting weight was 251 on 10/7/2020 she is 231  Going for shoulder surgery    Past recap  patient is here for follow-up on MRI of the right shoulder  She saw the orthopedic doctor and he said it is related to the neck  She went to see the C-spine doctor and MRIs ordered  She feels the pain is all in the shoulder.  She did the MRI  She is losing weight with Mounjaro she lost from 234 last time to 228  She is tolerating the dose not to stay on it.  She never felt this good     patient is here with complaints of having right shoulder pain.  On September 12 patient fell down backwards while trying to sit on the couch and she landed on the right shoulder.  She had x-ray done in the urgent care which was unremarkable.  She did massage  She is doing physical therapy but is not helping she is having difficult time raising her arm above the head   Patient is also here for refill on Mounjaro it is helping her wants to increase the dose      Past recap   patient is here for follow-up on obesity.  She also lives in her.  Lost 10 pounds.  Only side effect is constipation but taking  over-the-counter stool softeners and helping       her patient had appointment with the plastic surgeon  She is going for breast reduction  She needs to lose some weight and she also needs mammogram  Mounjaro is helping to lose some weight she lost 5 pounds  She wants to increase the dose as she is tolerating it well  She is having problems with constipation for Mounjaro she was in the urgent care  Because she slid down the stairs and hurt the right shoulder was not able to move x-ray was negative     patient here for follow-up  Did blood work  Got new CPAP machine  She is using her CPAP machine regularly  Follow-up on hypertension high cholesterol  Having a lot of acid  Not able to lose weight.  Does not follow diet  Does not exercise  Fell 2 weeks ago and hurting on the right shoulder.  Range of motion of the shoulder is normal  She is gaining weight and her breast size is getting weightbearing and causing her back pain wants with reduction surgerypast recap  Did pulmonary function test  Did 2D echo  She needs clearance from cardiologist for her work     Patient is here for follow-up on blood work  She quit taking her metformin  She quit using CPAP machine because of the cough  She did pulmonary function test but results are not available  She saw the allergist and sleep doctor who recommended her to wash the tubing to avoid coughing  Patient coughs only at night during the day she is fine  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  Patient has not lost weight  She has not changed her eating habits yet        Patient is here for follow-up on obstructive sleep apnea  She is using the CPAP machine it is helping her  Follow-up on hypertension high cholesterol coronary artery disease   needs medication refill  Not watching diet gained weight     Needs flu shot  Overdue for GYN exam  She is seriously considering gastric bypass surgery because she is not able to lose weight she wants  "insurance papers filled for her hospital stay for MI     She was not using her CPAP machine  Came to the hospital with chest pain and non-ST elevation MI had repeat cardiac catheter done did not show any significant blockage  Patient was treated with antibiotic and steroid and feeling better  Her blood pressure was high and Norvasc was started She has degenerative changes in the lower back so she cannot do heavy lifting     Review of Systems    Objective   /68   Ht 1.626 m (5' 4\")   Wt 111 kg (245 lb)   BMI 42.05 kg/m²     Physical Exam  Vitals reviewed.   Constitutional:       Appearance: Normal appearance. She is obese.   HENT:      Head: Normocephalic and atraumatic.      Right Ear: Tympanic membrane, ear canal and external ear normal.      Left Ear: Tympanic membrane, ear canal and external ear normal.      Nose: Nose normal.      Mouth/Throat:      Pharynx: Oropharynx is clear.   Eyes:      Extraocular Movements: Extraocular movements intact.      Conjunctiva/sclera: Conjunctivae normal.      Pupils: Pupils are equal, round, and reactive to light.   Cardiovascular:      Rate and Rhythm: Normal rate and regular rhythm.      Pulses: Normal pulses.      Heart sounds: Normal heart sounds.   Pulmonary:      Effort: Pulmonary effort is normal.      Breath sounds: Normal breath sounds.   Abdominal:      General: Abdomen is flat. Bowel sounds are normal.      Palpations: Abdomen is soft.   Musculoskeletal:         General: Tenderness present.      Cervical back: Normal range of motion and neck supple.      Comments: Right shoulder tenderness and limited range of motion   Skin:     General: Skin is warm and dry.   Neurological:      General: No focal deficit present.      Mental Status: She is alert and oriented to person, place, and time.   Psychiatric:         Mood and Affect: Mood normal.         Assessment/Plan   Problem List Items Addressed This Visit             ICD-10-CM       Endocrine/Metabolic    " Diabetes mellitus (Multi) E11.9    Relevant Medications    tirzepatide (Mounjaro) 10 mg/0.5 mL pen injector    Other Relevant Orders    CBC    Comprehensive Metabolic Panel    Hemoglobin A1C    Lipid Panel       Gastrointestinal and Abdominal    GERD (gastroesophageal reflux disease) K21.9    Relevant Medications    pantoprazole (ProtoNix) 40 mg EC tablet     Other Visit Diagnoses         Codes    Essential (primary) hypertension     I10    Relevant Medications    metoprolol tartrate (Lopressor) 50 mg tablet    amLODIPine (Norvasc) 5 mg tablet    Other Relevant Orders    CBC    Comprehensive Metabolic Panel    Hemoglobin A1C    Lipid Panel    Hyperlipidemia, unspecified     E78.5    Relevant Medications    atorvastatin (Lipitor) 80 mg tablet    Other Relevant Orders    CBC    Comprehensive Metabolic Panel    Hemoglobin A1C    Lipid Panel    Encounter for immunization     Z23    Relevant Orders    BI mammo bilateral screening tomosynthesis    Encounter for screening mammogram for malignant neoplasm of breast     Z12.31    Relevant Orders    BI mammo bilateral screening tomosynthesis            past recap  1. CAD. Had NSTEMI August 2015. Catheter showed no definite high-grade culprit lesion that would necessitate intervention. There was some moderate disease in the distal aspect of the first marginal branch of the LCx, but otherwise the patient had very minimal CAD. Echocardiogram done June 2017 showed an EF of 60-65%. Patient had repeat exercise nuclear stress test June 2017 which was negative for ischemia. Patient had again acute MI cardiac catheter did not show any blockage. MI was attributed to coronary spasm     2. Hypertension. Adequately controlled      3. Hyperlipidemia. . Continue atorvastatin 80 mg daily as well as Zetia 10 mg daily.     4. Obesity. Weight loss was encouraged. Refer to Dr. Skip Suarez for considering gastric bypass     5. Sleep apnea. Using CPAP     6. Carotid ultrasound. No hemodynamically  significant stenosis when done June 2017.     7. Diabetes  A1c 6.4  Doing better  Continue Metformin     #8 acute sinusitis  Treat with Z-Tien     Flu shot given  Mammogram ordered  Meds refilled for 6 months          6/27/2023  Blood work reviewed  A1c 6.3 slightly up  Cholesterol under control  Patient is using CPAP with improvement in symptoms uses every night 4 to 6 hours  Patient is not able to lose weight again discussed various options  Encourage diet and exercise   10/7/2023  X-ray of the right shoulder not needed as joint has full range of motion  Treat with Flexeril and Voltaren gel topically  Refer to plastics for breast hypertrophy  Blood work reviewed  Diabetes under control at 6.2  Cholesterol under control  Blood pressure stable TSH is slightly out of range which has happened in the past  We will monitor for now  Discussed diet and exercise to lose weight  We will try Wegovy versus Mounjaro what ever insurance covers to help her lose weight  Using CPAP  Follow-up blood work in 3 months    10/31/2023  Will increase dose of Mounjaro  Again discussed diet and exercise  Mammogram ordered  Call us 100 twice a day for constipation  She had a fall she slid down the stairs went to the urgent care and her right shoulder she hurt but now she is able to move it  Examination is normal  Follow-up in a month    11/27/2023  Patient is tolerating medication well had good response  Will increase the dose    1/2/2024  Patient probably has frozen shoulder  Will get MRI of the right shoulder  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    1/30/2024  MRI of the shoulder showed right shoulder supraspinatus full-thickness tear also shows tendinitis  Refer to orthopedic for further management  Refills given on Mounjaro 7.5  Continue diet and exercise  Blood work reviewed  A1c down to 5.8  ALT slightly elevated at  56  Continue diet exercise  Follow-up blood work in 3 months  Needs to repeat TSH   Follow-up in a month for Mounjaro    224  Will increase dose of 10 mg  Patient is tolerating no side effects  Losing weight  Still continue diet and exercise  Follow-up in a month    5/28/2024  Blood pressure stable  Patient is due for blood work she did not do the blood work  Blood work ordered  Medications refilled for 90 days  Follow-up after blood work  Again encourage patient to use Mounjaro. continue diet and exercise  Continue Mounjaro if it helps to lose weight  As long as she tolerates    8/10/2024  X-ray of the knee shows moderate arthritis  Full range of motion of the knee  No fracture  Knee brace  To physical therapy  Blood work reviewed  A1c gone up again to 7.3.  Patient does a lot of fluctuations up-and-down  Very hard to stick to the diet and exercise regimen  Blood pressure stable  Cholesterol very well-controlled  Continue Mounjaro  Follow-up blood work in 3 months    9/21/2024  Will continue the same dose of Mounjaro as patient is losing weight  Again discussed diet and exercise is the key factor  Blood pressure stable  Denies any symptoms of constipation  Take stool softener just to be safe to avoid constipation  Flu shot given    11/25/2024   blood work reviewed  ALT 62 A1c is 5.1 patient's weight has not changed much better diabetes is definitely doing better on Mounjaro  Liver enzymes still little high  Cholesterol well controlled  Again discussed diet and exercise with the patient  Follow-up blood work in 3 months

## 2024-12-02 ENCOUNTER — APPOINTMENT (OUTPATIENT)
Dept: PHARMACY | Facility: HOSPITAL | Age: 69
End: 2024-12-02
Payer: COMMERCIAL

## 2024-12-02 DIAGNOSIS — E78.5 HYPERLIPIDEMIA, UNSPECIFIED HYPERLIPIDEMIA TYPE: ICD-10-CM

## 2024-12-02 RX ORDER — EZETIMIBE 10 MG/1
10 TABLET ORAL DAILY
Qty: 90 TABLET | Refills: 3 | Status: SHIPPED | OUTPATIENT
Start: 2024-12-02 | End: 2025-12-02

## 2024-12-02 NOTE — PROGRESS NOTES
Pharmacist Clinic: Cardiology Management    Stefanie Garnett is a 69 y.o. female was referred to Clinical Pharmacy Team for Cholesterol management.     Referring Provider: Latanya Severino APRN*    THIS IS A NEW PATIENT APPOINTMENT. PATIENT WILL BE ESTABLISHING CARE WITH CLINICAL PHARMACY.    Appointment was completed by Stefanie who was reached at primary number.    Allergies Reviewed? Yes    No Known Allergies    Past Medical History:   Diagnosis Date    Arm pain     Arm swelling     Asymptomatic menopausal state     Back pain     Back pain     CAD (coronary artery disease)     CAD (coronary artery disease)     Carpal tunnel syndrome     Chest pain     Cough     CPAP (continuous positive airway pressure) dependence     Diabetes mellitus (Multi)     Dizziness     Elevated blood sugar     Exposure to COVID-19 virus     Fall     Floaters     GERD (gastroesophageal reflux disease)     High cholesterol     HPV in female     Hx of cardiac cath     Hyperlipidemia     Hypertension     Ingrown toenail     Knee pain     Left arm numbness     Left foot pain     Left leg swelling     Meralgia paraesthetica, left     Moderate persistent atopic asthma without complication ([facility])     Morbid obesity (Multi)     Numbness     Obesity     ANGELICA (obstructive sleep apnea)     Pain in arm, unspecified     Arm pain    Pain of lower extremity     Pain of upper extremity     Palpitations     Personal history of other medical treatment 04/03/2019    History of screening mammography    Positive TB test     Restrictive lung disease     Shoulder pain     Sleep apnea     Snoring     SOB (shortness of breath)     Tail bone pain     Vaginal Pap smear        Current Outpatient Medications on File Prior to Visit   Medication Sig Dispense Refill    amLODIPine (Norvasc) 5 mg tablet Take 1 tablet (5 mg) by mouth once daily. 90 tablet 0    aspirin 81 mg EC tablet Take 1 tablet (81 mg) by mouth once daily.      atorvastatin (Lipitor) 80 mg  "tablet Take 1 tablet (80 mg) by mouth once daily at bedtime. 90 tablet 0    cholecalciferol (Vitamin D-3) 50 MCG (2000 UT) tablet Take 1 tablet (2,000 Units) by mouth once daily.      losartan (Cozaar) 100 mg tablet TAKE 1 TABLET DAILY. 90 tablet 3    metoprolol tartrate (Lopressor) 50 mg tablet Take 1 tablet by mouth 2 times a day. 180 tablet 0    nabumetone (Relafen) 750 mg tablet Take 1 tablet (750 mg) by mouth 2 times a day. 60 tablet 0    omega 3-dha-epa-fish oil 360 mg-108 mg- 180 mg-1,200 mg capsule Take 1 capsule by mouth once daily.      pantoprazole (ProtoNix) 40 mg EC tablet Take 1 tablet (40 mg) by mouth once daily. 90 tablet 0    tirzepatide (Mounjaro) 10 mg/0.5 mL pen injector Inject 10 mg under the skin 1 (one) time per week. 3 mL 0     No current facility-administered medications on file prior to visit.         RELEVANT LAB RESULTS:  Lab Results   Component Value Date    BILITOT 0.9 11/15/2024    CALCIUM 9.8 11/15/2024    CO2 28 11/15/2024     11/15/2024    CREATININE 0.81 11/15/2024    GLUCOSE 77 11/15/2024    ALKPHOS 92 11/15/2024    K 4.4 11/15/2024    PROT 6.4 11/15/2024     11/15/2024    AST 27 11/15/2024    ALT 62 (H) 11/15/2024    BUN 11 11/15/2024    ANIONGAP 13 11/15/2024    ALBUMIN 4.1 11/15/2024    GFRF 82 09/23/2023     Lab Results   Component Value Date    TRIG 73 11/15/2024    CHOL 131 11/15/2024    LDLCALC 72 11/15/2024    HDL 44.5 11/15/2024     No results found for: \"BMCBC\", \"CBCDIF\"     PHARMACEUTICAL ASSESSMENT:    MEDICATION RECONCILIATION    Home Pharmacy Reviewed? Yes, describe: [facility] in [place]    Unable to completed medication reconciliation at today's visit.    Drug Interactions? No    Medication Documentation Review Audit       Reviewed by Logan Rodriguez MA (Medical Assistant) on 11/25/24 at 0939      Medication Order Taking? Sig Documenting Provider Last Dose Status   amLODIPine (Norvasc) 5 mg tablet [id number] No Take 1 tablet (5 mg) by mouth once daily. " Lizz Lew MD Taking Active   aspirin 81 mg EC tablet [id number]  Take 1 tablet (81 mg) by mouth once daily. Historical Provider, MD  Active   atorvastatin (Lipitor) 80 mg tablet [id number] No Take 1 tablet (80 mg) by mouth once daily at bedtime. Lizz Lew MD Taking Active   cholecalciferol (Vitamin D-3) 50 MCG (2000 UT) tablet [id number] No Take 1 tablet (2,000 Units) by mouth once daily. Historical Provider, MD Taking Active   losartan (Cozaar) 100 mg tablet [id number] No TAKE 1 TABLET DAILY. DARREN Cooper-CNP Taking Active   metoprolol tartrate (Lopressor) 50 mg tablet [id number] No Take 1 tablet by mouth 2 times a day. Lizz Lew MD Taking Active     Discontinued 11/19/24 1825   nabumetone (Relafen) 750 mg tablet [id number]  Take 1 tablet (750 mg) by mouth 2 times a day. Valerio Lew MD  Active   omega 3-dha-epa-fish oil 360 mg-108 mg- 180 mg-1,200 mg capsule [id number] No Take 1 capsule by mouth once daily. Historical Provider, MD Taking Active   Discontinued 11/21/24 1805   pantoprazole (ProtoNix) 40 mg EC tablet [id number]  Take 1 tablet (40 mg) by mouth once daily. Lizz Lew MD  Active   tirzepatide (Mounjaro) 10 mg/0.5 mL pen injector [id number] No Inject 10 mg under the skin 1 (one) time per week. Lizz Lew MD Taking Active                    DISEASE MANAGEMENT ASSESSMENT:     HYPERCHOLESTEROLEMIA ASSESSMENT    RECENT LIPID PANEL (DATE): 11/15/24  Lab Results   Component Value Date    TRIG 73 11/15/2024    CHOL 131 11/15/2024    LDLCALC 72 11/15/2024    HDL 44.5 11/15/2024          ASCVD SCORE: The ASCVD Risk score (Primo TODD, et al., 2019) failed to calculate for the following reasons:    Risk score cannot be calculated because patient has a medical history suggesting prior/existing ASCVD  Coronary Heart Disease (MI, angina, coronary artery stenosis): Yes   History of ischemic stroke? No   History of carotid artery stenosis? Yes   Peripheral artery disease? No   ASCVD RISK FACTORS:    CKD?  No   Diabetes? Yes   HTN? Yes   Persistently elevated LDL? No   Elevated triglycerides? No   Inflammatory diseases (rheumatoid arthritis, psoriasis, HIV)? No    CURRENT PHARMACOTHERAPY  - Statin? Yes, describe: atorvastatin 80mg daily  - Ezetimibe? No  - PCSK9-I? No  - Other lipid lowering agents? No    ASSESSMENT    Affordability/Accessibility: insurance has high copay's on her medications.  Adherence/Organization: reports adherence  Adverse Effects: none reported  Recent Hospitalizations: No  Diet: avoid unhealthy foods as she is focusing on losing weight.  Exercise: No  Tobacco Use: No  Alcohol Use: No  Next Lipid Panel: current lipid order placed. Last checked on 11/15/24.      EDUCATION/COUNSELING:  - Counseled patient on MOA, expectations, side effects, duration of therapy, contraindications, administration, and monitoring parameters  - Answered all patient questions and concerns    DISCUSSION/NOTES:   The patient did not want to start a PCSK9i medication at this time. Would like to continue with previous regimen with ezetimibe, but was not able to due to cost.  New prescription sent to [facility] where cost is reported to be affordable (9.50 for 90 tablets).  Patient understands this pharmacy is delivery only and needs to use online portal to request refills.    ASSESSMENT:    Assessment/Plan   Problem List Items Addressed This Visit       Hyperlipemia     Last LDL of 72mg/dl. Will continue with ezetimibe at affordable cost.              RECOMMENDATIONS/PLAN:    CONTINUE  Atorvastatin 80mg daily  RESTART  Ezetimibe 10mg daily    Last Appnt with Referring Provider: 8/22/24  Next Appnt with Referring Provider: 2/27/25  Clinical Pharmacist follow up: as needed by request from patient or provider  VAF/Application Expiration: No - not needed at this time.  Type of Encounter: Yeimy Lewis PharmD    Verbal consent to manage patient's drug therapy was obtained from the patient . They were  informed they may decline to participate or withdraw from participation in pharmacy services at any time.    Continue all meds under the continuation of care with the referring provider and clinical pharmacy team.

## 2024-12-02 NOTE — Clinical Note
Patient does not want to start Repatha or Praluent at this time. Last LDL of 72. Zetia is $9.50 for 90 tablets at [facility]. She reports this price is affordable and we will restart on Zetia therapy.

## 2024-12-04 ENCOUNTER — HOSPITAL ENCOUNTER (OUTPATIENT)
Dept: RADIOLOGY | Facility: CLINIC | Age: 69
Discharge: HOME | End: 2024-12-04
Payer: COMMERCIAL

## 2024-12-04 VITALS — BODY MASS INDEX: 40.82 KG/M2 | HEIGHT: 65 IN | WEIGHT: 245 LBS

## 2024-12-04 DIAGNOSIS — Z12.31 ENCOUNTER FOR SCREENING MAMMOGRAM FOR MALIGNANT NEOPLASM OF BREAST: ICD-10-CM

## 2024-12-04 DIAGNOSIS — Z23 ENCOUNTER FOR IMMUNIZATION: ICD-10-CM

## 2024-12-04 PROCEDURE — 77063 BREAST TOMOSYNTHESIS BI: CPT | Performed by: STUDENT IN AN ORGANIZED HEALTH CARE EDUCATION/TRAINING PROGRAM

## 2024-12-04 PROCEDURE — 77067 SCR MAMMO BI INCL CAD: CPT | Performed by: STUDENT IN AN ORGANIZED HEALTH CARE EDUCATION/TRAINING PROGRAM

## 2024-12-04 PROCEDURE — 77067 SCR MAMMO BI INCL CAD: CPT

## 2024-12-21 DIAGNOSIS — M25.562 LEFT KNEE PAIN, UNSPECIFIED CHRONICITY: ICD-10-CM

## 2024-12-21 DIAGNOSIS — M25.462 SWELLING OF LEFT KNEE JOINT: ICD-10-CM

## 2024-12-22 RX ORDER — NABUMETONE 750 MG/1
750 TABLET, FILM COATED ORAL 2 TIMES DAILY
Qty: 60 TABLET | Refills: 0 | Status: SHIPPED | OUTPATIENT
Start: 2024-12-22

## 2025-01-11 DIAGNOSIS — M25.462 SWELLING OF LEFT KNEE JOINT: ICD-10-CM

## 2025-01-11 DIAGNOSIS — M25.562 LEFT KNEE PAIN, UNSPECIFIED CHRONICITY: ICD-10-CM

## 2025-01-11 RX ORDER — NABUMETONE 750 MG/1
750 TABLET, FILM COATED ORAL 2 TIMES DAILY
Qty: 60 TABLET | Refills: 0 | Status: SHIPPED | OUTPATIENT
Start: 2025-01-11

## 2025-01-13 ENCOUNTER — DOCUMENTATION (OUTPATIENT)
Dept: PHYSICAL THERAPY | Facility: CLINIC | Age: 70
End: 2025-01-13
Payer: MEDICARE

## 2025-01-13 NOTE — PROGRESS NOTES
Discharge Summary    Name: Stefanie Garnett  MRN: [id number]  : 1955  Date: 25    Treatment Diagnosis:   Problem List Items Addressed This Visit    None      Discharge Summary: PT    Discharge Information: Date of Discharge 2025    Therapy Summary: Patient only attended the initial evaluation and 1 follow ups. Due to lack of follow ups, compliance and goal achievement unassessed at this time.     Rehab Discharge Reason: Failed to schedule and/or keep follow-up appointment(s)

## 2025-02-11 LAB
ALBUMIN SERPL-MCNC: 4.1 G/DL (ref 3.6–5.1)
ALP SERPL-CCNC: 92 U/L (ref 37–153)
ALT SERPL-CCNC: 44 U/L (ref 6–29)
ANION GAP SERPL CALCULATED.4IONS-SCNC: 8 MMOL/L (CALC) (ref 7–17)
AST SERPL-CCNC: 23 U/L (ref 10–35)
BILIRUB SERPL-MCNC: 0.9 MG/DL (ref 0.2–1.2)
BUN SERPL-MCNC: 11 MG/DL (ref 7–25)
CALCIUM SERPL-MCNC: 9.5 MG/DL (ref 8.6–10.4)
CHLORIDE SERPL-SCNC: 108 MMOL/L (ref 98–110)
CHOLEST SERPL-MCNC: 141 MG/DL
CHOLEST/HDLC SERPL: 3.4 (CALC)
CO2 SERPL-SCNC: 26 MMOL/L (ref 20–32)
CREAT SERPL-MCNC: 0.75 MG/DL (ref 0.5–1.05)
EGFRCR SERPLBLD CKD-EPI 2021: 87 ML/MIN/1.73M2
ERYTHROCYTE [DISTWIDTH] IN BLOOD BY AUTOMATED COUNT: 14.4 % (ref 11–15)
EST. AVERAGE GLUCOSE BLD GHB EST-MCNC: 131 MG/DL
EST. AVERAGE GLUCOSE BLD GHB EST-SCNC: 7.3 MMOL/L
GLUCOSE SERPL-MCNC: 117 MG/DL (ref 65–99)
HBA1C MFR BLD: 6.2 % OF TOTAL HGB
HCT VFR BLD AUTO: 43.4 % (ref 35–45)
HDLC SERPL-MCNC: 42 MG/DL
HGB BLD-MCNC: 14.2 G/DL (ref 11.7–15.5)
LDLC SERPL CALC-MCNC: 81 MG/DL (CALC)
MCH RBC QN AUTO: 27.2 PG (ref 27–33)
MCHC RBC AUTO-ENTMCNC: 32.7 G/DL (ref 32–36)
MCV RBC AUTO: 83 FL (ref 80–100)
NONHDLC SERPL-MCNC: 99 MG/DL (CALC)
PLATELET # BLD AUTO: 198 THOUSAND/UL (ref 140–400)
PMV BLD REES-ECKER: 11.3 FL (ref 7.5–12.5)
POTASSIUM SERPL-SCNC: 4.4 MMOL/L (ref 3.5–5.3)
PROT SERPL-MCNC: 6.3 G/DL (ref 6.1–8.1)
RBC # BLD AUTO: 5.23 MILLION/UL (ref 3.8–5.1)
SODIUM SERPL-SCNC: 142 MMOL/L (ref 135–146)
TRIGL SERPL-MCNC: 92 MG/DL
WBC # BLD AUTO: 7.2 THOUSAND/UL (ref 3.8–10.8)

## 2025-02-18 ENCOUNTER — APPOINTMENT (OUTPATIENT)
Dept: PRIMARY CARE | Facility: CLINIC | Age: 70
End: 2025-02-18
Payer: COMMERCIAL

## 2025-02-18 VITALS
DIASTOLIC BLOOD PRESSURE: 60 MMHG | SYSTOLIC BLOOD PRESSURE: 122 MMHG | WEIGHT: 250 LBS | HEIGHT: 65 IN | BODY MASS INDEX: 41.65 KG/M2

## 2025-02-18 DIAGNOSIS — I10 ESSENTIAL (PRIMARY) HYPERTENSION: ICD-10-CM

## 2025-02-18 DIAGNOSIS — E78.5 HYPERLIPIDEMIA, UNSPECIFIED: ICD-10-CM

## 2025-02-18 DIAGNOSIS — K21.9 GASTROESOPHAGEAL REFLUX DISEASE WITHOUT ESOPHAGITIS: ICD-10-CM

## 2025-02-18 DIAGNOSIS — E11.9 TYPE 2 DIABETES MELLITUS WITHOUT COMPLICATION, WITHOUT LONG-TERM CURRENT USE OF INSULIN (MULTI): ICD-10-CM

## 2025-02-18 DIAGNOSIS — R42 DIZZINESS: ICD-10-CM

## 2025-02-18 DIAGNOSIS — E78.5 HYPERLIPIDEMIA, UNSPECIFIED HYPERLIPIDEMIA TYPE: ICD-10-CM

## 2025-02-18 PROCEDURE — 1159F MED LIST DOCD IN RCRD: CPT | Performed by: INTERNAL MEDICINE

## 2025-02-18 PROCEDURE — 99214 OFFICE O/P EST MOD 30 MIN: CPT | Performed by: INTERNAL MEDICINE

## 2025-02-18 PROCEDURE — 3078F DIAST BP <80 MM HG: CPT | Performed by: INTERNAL MEDICINE

## 2025-02-18 PROCEDURE — 3074F SYST BP LT 130 MM HG: CPT | Performed by: INTERNAL MEDICINE

## 2025-02-18 PROCEDURE — 4010F ACE/ARB THERAPY RXD/TAKEN: CPT | Performed by: INTERNAL MEDICINE

## 2025-02-18 PROCEDURE — 1123F ACP DISCUSS/DSCN MKR DOCD: CPT | Performed by: INTERNAL MEDICINE

## 2025-02-18 PROCEDURE — 3008F BODY MASS INDEX DOCD: CPT | Performed by: INTERNAL MEDICINE

## 2025-02-18 RX ORDER — METOPROLOL TARTRATE 50 MG/1
50 TABLET ORAL 2 TIMES DAILY
Qty: 180 TABLET | Refills: 0 | Status: SHIPPED | OUTPATIENT
Start: 2025-02-18

## 2025-02-18 RX ORDER — ATORVASTATIN CALCIUM 80 MG/1
80 TABLET, FILM COATED ORAL NIGHTLY
Qty: 90 TABLET | Refills: 0 | Status: SHIPPED | OUTPATIENT
Start: 2025-02-18

## 2025-02-18 RX ORDER — AMLODIPINE BESYLATE 5 MG/1
5 TABLET ORAL DAILY
Qty: 90 TABLET | Refills: 0 | Status: SHIPPED | OUTPATIENT
Start: 2025-02-18

## 2025-02-18 RX ORDER — TIRZEPATIDE 10 MG/.5ML
10 INJECTION, SOLUTION SUBCUTANEOUS
Qty: 3 ML | Refills: 1 | Status: SHIPPED | OUTPATIENT
Start: 2025-02-18

## 2025-02-18 RX ORDER — PANTOPRAZOLE SODIUM 40 MG/1
40 TABLET, DELAYED RELEASE ORAL DAILY
Qty: 90 TABLET | Refills: 0 | Status: SHIPPED | OUTPATIENT
Start: 2025-02-18

## 2025-02-18 ASSESSMENT — ENCOUNTER SYMPTOMS
LOSS OF SENSATION IN FEET: 0
DEPRESSION: 0
OCCASIONAL FEELINGS OF UNSTEADINESS: 0

## 2025-02-18 NOTE — PROGRESS NOTES
Subjective   Patient ID: Stefanie Garnett is a 69 y.o. female who presents for Follow-up and Med Refill.    Med Refill  Patient is here for follow-up needs refill on Mounjaro she was out of the medication for 2 months.  Did not gain weight but did not lose either  Having episodes of dizziness off-and-on follow-up hypertension high cholesterol coronary artery disease  Using CPAP for obstructive sleep apnea    Past recap  Patient here for follow-up on Mounjaro  No side effects tolerating   Losing weight     Past recap   patient is here for follow-up on Mounjaro  Tolerating the diet wondering if she can increase the dose has no side effects lost 6 pounds    Patient here for follow-up on x-ray.  Lost balance on the carpet of the stairs and fell on the left knee.  It is hurting  Follow-up on hypertension high cholesterol coronary artery disease    Patient is here for follow-up  Had right shoulder surgery.  Doing better  Follow-up on hypertension high cholesterol coronary artery disease  GERD  Using CPAP for sleep apnea    Patient is here for follow-up on Mounjaro  She is losing weight.  Feeling better.  She is on 7.5 mg right now  Her starting weight was 251 on 10/7/2020 she is 231  Going for shoulder surgery    Past recap  patient is here for follow-up on MRI of the right shoulder  She saw the orthopedic doctor and he said it is related to the neck  She went to see the C-spine doctor and MRIs ordered  She feels the pain is all in the shoulder.  She did the MRI  She is losing weight with Mounjaro she lost from 234 last time to 228  She is tolerating the dose not to stay on it.  She never felt this good     patient is here with complaints of having right shoulder pain.  On September 12 patient fell down backwards while trying to sit on the couch and she landed on the right shoulder.  She had x-ray done in the urgent care which was unremarkable.  She did massage  She is doing physical therapy but is not helping she is  having difficult time raising her arm above the head   Patient is also here for refill on Mounjaro it is helping her wants to increase the dose      Past recap   patient is here for follow-up on obesity.  She also lives in her.  Lost 10 pounds.  Only side effect is constipation but taking over-the-counter stool softeners and helping       her patient had appointment with the plastic surgeon  She is going for breast reduction  She needs to lose some weight and she also needs mammogram  Mounjaro is helping to lose some weight she lost 5 pounds  She wants to increase the dose as she is tolerating it well  She is having problems with constipation for Mounjaro she was in the urgent care  Because she slid down the stairs and hurt the right shoulder was not able to move x-ray was negative     patient here for follow-up  Did blood work  Got new CPAP machine  She is using her CPAP machine regularly  Follow-up on hypertension high cholesterol  Having a lot of acid  Not able to lose weight.  Does not follow diet  Does not exercise  Fell 2 weeks ago and hurting on the right shoulder.  Range of motion of the shoulder is normal  She is gaining weight and her breast size is getting weightbearing and causing her back pain wants with reduction surgerypast recap  Did pulmonary function test  Did 2D echo  She needs clearance from cardiologist for her work     Patient is here for follow-up on blood work  She quit taking her metformin  She quit using CPAP machine because of the cough  She did pulmonary function test but results are not available  She saw the allergist and sleep doctor who recommended her to wash the tubing to avoid coughing  Patient coughs only at night during the day she is fine  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  Patient has not lost weight  She has not changed her eating habits yet        Patient is here for follow-up on obstructive sleep apnea  She is using the CPAP  "machine it is helping her  Follow-up on hypertension high cholesterol coronary artery disease   needs medication refill  Not watching diet gained weight     Needs flu shot  Overdue for GYN exam  She is seriously considering gastric bypass surgery because she is not able to lose weight she wants insurance papers filled for her hospital stay for MI     She was not using her CPAP machine  Came to the hospital with chest pain and non-ST elevation MI had repeat cardiac catheter done did not show any significant blockage  Patient was treated with antibiotic and steroid and feeling better  Her blood pressure was high and Norvasc was started She has degenerative changes in the lower back so she cannot do heavy lifting     Review of Systems    Objective   /60   Ht 1.651 m (5' 5\")   Wt 113 kg (250 lb)   BMI 41.60 kg/m²     Physical Exam  Vitals reviewed.   Constitutional:       Appearance: Normal appearance. She is obese.   HENT:      Head: Normocephalic and atraumatic.      Right Ear: Tympanic membrane, ear canal and external ear normal.      Left Ear: Tympanic membrane, ear canal and external ear normal.      Nose: Nose normal.      Mouth/Throat:      Pharynx: Oropharynx is clear.   Eyes:      Extraocular Movements: Extraocular movements intact.      Conjunctiva/sclera: Conjunctivae normal.      Pupils: Pupils are equal, round, and reactive to light.   Cardiovascular:      Rate and Rhythm: Normal rate and regular rhythm.      Pulses: Normal pulses.      Heart sounds: Normal heart sounds.   Pulmonary:      Effort: Pulmonary effort is normal.      Breath sounds: Normal breath sounds.   Abdominal:      General: Abdomen is flat. Bowel sounds are normal.      Palpations: Abdomen is soft.   Musculoskeletal:         General: Tenderness present.      Cervical back: Normal range of motion and neck supple.      Comments: Right shoulder tenderness and limited range of motion   Skin:     General: Skin is warm and dry. "   Neurological:      General: No focal deficit present.      Mental Status: She is alert and oriented to person, place, and time.   Psychiatric:         Mood and Affect: Mood normal.       Assessment/Plan   Problem List Items Addressed This Visit             ICD-10-CM       Cardiac and Vasculature    Hyperlipemia E78.5       Gastrointestinal and Abdominal    GERD (gastroesophageal reflux disease) K21.9     Other Visit Diagnoses         Codes    Essential (primary) hypertension     I10    Hyperlipidemia, unspecified     E78.5            past recap  1. CAD. Had NSTEMI August 2015. Catheter showed no definite high-grade culprit lesion that would necessitate intervention. There was some moderate disease in the distal aspect of the first marginal branch of the LCx, but otherwise the patient had very minimal CAD. Echocardiogram done June 2017 showed an EF of 60-65%. Patient had repeat exercise nuclear stress test June 2017 which was negative for ischemia. Patient had again acute MI cardiac catheter did not show any blockage. MI was attributed to coronary spasm     2. Hypertension. Adequately controlled      3. Hyperlipidemia. . Continue atorvastatin 80 mg daily as well as Zetia 10 mg daily.     4. Obesity. Weight loss was encouraged. Refer to Dr. Skip Suarez for considering gastric bypass     5. Sleep apnea. Using CPAP     6. Carotid ultrasound. No hemodynamically significant stenosis when done June 2017.     7. Diabetes  A1c 6.4  Doing better  Continue Metformin     #8 acute sinusitis  Treat with Z-Tien     Flu shot given  Mammogram ordered  Meds refilled for 6 months          6/27/2023  Blood work reviewed  A1c 6.3 slightly up  Cholesterol under control  Patient is using CPAP with improvement in symptoms uses every night 4 to 6 hours  Patient is not able to lose weight again discussed various options  Encourage diet and exercise   10/7/2023  X-ray of the right shoulder not needed as joint has full range of motion  Treat  with Flexeril and Voltaren gel topically  Refer to plastics for breast hypertrophy  Blood work reviewed  Diabetes under control at 6.2  Cholesterol under control  Blood pressure stable TSH is slightly out of range which has happened in the past  We will monitor for now  Discussed diet and exercise to lose weight  We will try Wegovy versus Mounjaro what ever insurance covers to help her lose weight  Using CPAP  Follow-up blood work in 3 months    10/31/2023  Will increase dose of Mounjaro  Again discussed diet and exercise  Mammogram ordered  Call us 100 twice a day for constipation  She had a fall she slid down the stairs went to the urgent care and her right shoulder she hurt but now she is able to move it  Examination is normal  Follow-up in a month    11/27/2023  Patient is tolerating medication well had good response  Will increase the dose    1/2/2024  Patient probably has frozen shoulder  Will get MRI of the right shoulder  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    1/30/2024  MRI of the shoulder showed right shoulder supraspinatus full-thickness tear also shows tendinitis  Refer to orthopedic for further management  Refills given on Mounjaro 7.5  Continue diet and exercise  Blood work reviewed  A1c down to 5.8  ALT slightly elevated at 56  Continue diet exercise  Follow-up blood work in 3 months  Needs to repeat TSH   Follow-up in a month for Mounjaro    224  Will increase dose of 10 mg  Patient is tolerating no side effects  Losing weight  Still continue diet and exercise  Follow-up in a month    5/28/2024  Blood pressure stable  Patient is due for blood work she did not do the blood work  Blood work ordered  Medications refilled for 90 days  Follow-up after blood work  Again encourage patient to use Mounjaro. continue diet and exercise  Continue Mounjaro if it helps to lose weight  As long as she  tolerates    8/10/2024  X-ray of the knee shows moderate arthritis  Full range of motion of the knee  No fracture  Knee brace  To physical therapy  Blood work reviewed  A1c gone up again to 7.3.  Patient does a lot of fluctuations up-and-down  Very hard to stick to the diet and exercise regimen  Blood pressure stable  Cholesterol very well-controlled  Continue Mounjaro  Follow-up blood work in 3 months    9/21/2024  Will continue the same dose of Mounjaro as patient is losing weight  Again discussed diet and exercise is the key factor  Blood pressure stable  Denies any symptoms of constipation  Take stool softener just to be safe to avoid constipation  Flu shot given    11/25/2024   blood work reviewed  ALT 62 A1c is 5.1 patient's weight has not changed much better diabetes is definitely doing better on Mounjaro  Liver enzymes still little high  Cholesterol well controlled  Again discussed diet and exercise with the patient  Follow-up blood work in 3 months    2/18/2025  Follow up to 6.2  Probably because patient was not on Mounjaro  LFTs slightly better  Cholesterol okay  Mounjaro given for 3 months  Follow-up blood work in 3 months  Again discussed diet and exercise  Will do ultrasound carotids to assess symptoms of dizziness discussed long-term complications of obesity sleep apnea diabetes hypertension  Patient better lifestyle  Follow-up in 3 months

## 2025-02-20 ENCOUNTER — HOSPITAL ENCOUNTER (OUTPATIENT)
Dept: RADIOLOGY | Facility: CLINIC | Age: 70
Discharge: HOME | End: 2025-02-20
Payer: MEDICARE

## 2025-02-20 DIAGNOSIS — R42 DIZZINESS: ICD-10-CM

## 2025-02-20 PROCEDURE — 93880 EXTRACRANIAL BILAT STUDY: CPT

## 2025-02-27 ENCOUNTER — OFFICE VISIT (OUTPATIENT)
Dept: CARDIOLOGY | Facility: CLINIC | Age: 70
End: 2025-02-27
Payer: MEDICARE

## 2025-02-27 VITALS
HEART RATE: 57 BPM | DIASTOLIC BLOOD PRESSURE: 80 MMHG | WEIGHT: 252 LBS | SYSTOLIC BLOOD PRESSURE: 129 MMHG | OXYGEN SATURATION: 95 % | BODY MASS INDEX: 43.02 KG/M2 | HEIGHT: 64 IN

## 2025-02-27 DIAGNOSIS — E78.5 HYPERLIPIDEMIA, UNSPECIFIED HYPERLIPIDEMIA TYPE: Primary | ICD-10-CM

## 2025-02-27 DIAGNOSIS — I10 BENIGN HYPERTENSION: ICD-10-CM

## 2025-02-27 PROCEDURE — 99214 OFFICE O/P EST MOD 30 MIN: CPT | Performed by: NURSE PRACTITIONER

## 2025-02-27 PROCEDURE — 4010F ACE/ARB THERAPY RXD/TAKEN: CPT | Performed by: NURSE PRACTITIONER

## 2025-02-27 PROCEDURE — 1123F ACP DISCUSS/DSCN MKR DOCD: CPT | Performed by: NURSE PRACTITIONER

## 2025-02-27 PROCEDURE — 1159F MED LIST DOCD IN RCRD: CPT | Performed by: NURSE PRACTITIONER

## 2025-02-27 PROCEDURE — G2211 COMPLEX E/M VISIT ADD ON: HCPCS | Performed by: NURSE PRACTITIONER

## 2025-02-27 PROCEDURE — 3074F SYST BP LT 130 MM HG: CPT | Performed by: NURSE PRACTITIONER

## 2025-02-27 PROCEDURE — 3079F DIAST BP 80-89 MM HG: CPT | Performed by: NURSE PRACTITIONER

## 2025-02-27 PROCEDURE — 1036F TOBACCO NON-USER: CPT | Performed by: NURSE PRACTITIONER

## 2025-02-27 PROCEDURE — 1160F RVW MEDS BY RX/DR IN RCRD: CPT | Performed by: NURSE PRACTITIONER

## 2025-02-27 PROCEDURE — 3008F BODY MASS INDEX DOCD: CPT | Performed by: NURSE PRACTITIONER

## 2025-02-27 ASSESSMENT — ENCOUNTER SYMPTOMS
GASTROINTESTINAL NEGATIVE: 1
CARDIOVASCULAR NEGATIVE: 1
MUSCULOSKELETAL NEGATIVE: 1
CONSTITUTIONAL NEGATIVE: 1
RESPIRATORY NEGATIVE: 1
NEUROLOGICAL NEGATIVE: 1

## 2025-02-27 NOTE — PROGRESS NOTES
"Chief Complaint:   Follow up    History Of Present Illness:    .Ms Garnett is here in follow up. Denies chest pain, sob, palpitations or pedal edema.  Did not start Repatha as pcp said she didn't need it.  Is just starting Monjaro and off Zetia to afford it again. Is solely on atorvastatin.              Last Recorded Vitals:  Blood pressure 129/80, pulse 57, height 1.626 m (5' 4\"), weight 114 kg (252 lb), SpO2 95%.     Past Medical History:  Past Medical History:   Diagnosis Date    Arm pain     Arm swelling     Asymptomatic menopausal state     Back pain     Back pain     CAD (coronary artery disease)     CAD (coronary artery disease)     Carpal tunnel syndrome     Chest pain     Cough     CPAP (continuous positive airway pressure) dependence     Diabetes mellitus (Multi)     Dizziness     Elevated blood sugar     Exposure to COVID-19 virus     Fall     Floaters     GERD (gastroesophageal reflux disease)     High cholesterol     HPV in female     Hx of cardiac cath     Hyperlipidemia     Hypertension     Ingrown toenail     Knee pain     Left arm numbness     Left foot pain     Left leg swelling     Meralgia paraesthetica, left     Moderate persistent atopic asthma without complication ([facility])     Morbid obesity (Multi)     Numbness     Obesity     ANGELICA (obstructive sleep apnea)     Pain in arm, unspecified     Arm pain    Pain of lower extremity     Pain of upper extremity     Palpitations     Personal history of other medical treatment 04/03/2019    History of screening mammography    Positive TB test     Restrictive lung disease     Shoulder pain     Sleep apnea     Snoring     SOB (shortness of breath)     Tail bone pain     Vaginal Pap smear         Past Surgical History:  Past Surgical History:   Procedure Laterality Date    CARDIAC CATHETERIZATION      HYSTERECTOMY  07/30/2015    Hysterectomy    HYSTERECTOMY  09/04/2015    Hysterectomy    TUBAL LIGATION  07/30/2015    Tubal Ligation    TUBAL LIGATION  " 09/04/2015    Tubal Ligation       Social History:  Social History     Socioeconomic History    Marital status:    Tobacco Use    Smoking status: Never     Passive exposure: Never    Smokeless tobacco: Never   Substance and Sexual Activity    Alcohol use: Not Currently     Comment: OCCASIONAL WINE    Drug use: Never    Sexual activity: Defer       Family History:  Family History   Problem Relation Name Age of Onset    Other (malignant neoplasm of urnary bladder) Mother      Other (cardiac disorder) Other      Other (diabetes mellitus) Other           Allergies:  Patient has no known allergies.    Outpatient Medications:  Current Outpatient Medications   Medication Sig Dispense Refill    amLODIPine (Norvasc) 5 mg tablet Take 1 tablet (5 mg) by mouth once daily. 90 tablet 0    aspirin 81 mg EC tablet Take 1 tablet (81 mg) by mouth once daily.      atorvastatin (Lipitor) 80 mg tablet Take 1 tablet (80 mg) by mouth once daily at bedtime. 90 tablet 0    cholecalciferol (Vitamin D-3) 50 MCG (2000 UT) tablet Take 1 tablet (2,000 Units) by mouth once daily.      losartan (Cozaar) 100 mg tablet TAKE 1 TABLET DAILY. 90 tablet 3    metoprolol tartrate (Lopressor) 50 mg tablet Take 1 tablet by mouth 2 times a day. 180 tablet 0    nabumetone (Relafen) 750 mg tablet Take 1 tablet (750 mg) by mouth 2 times a day. 60 tablet 0    omega 3-dha-epa-fish oil 360 mg-108 mg- 180 mg-1,200 mg capsule Take 1 capsule by mouth once daily.      tirzepatide (Mounjaro) 10 mg/0.5 mL pen injector Inject 10 mg under the skin 1 (one) time per week. 3 mL 1    ezetimibe (Zetia) 10 mg tablet Take 1 tablet (10 mg) by mouth once daily. (Patient not taking: Reported on 2/27/2025) 90 tablet 3    pantoprazole (ProtoNix) 40 mg EC tablet Take 1 tablet (40 mg) by mouth once daily. (Patient not taking: Reported on 2/27/2025) 90 tablet 0     No current facility-administered medications for this visit.        Physical Exam:  Cardiovascular:      PMI at left  midclavicular line. Normal rate. Regular rhythm. Normal S1. Normal S2.       Murmurs: There is no murmur.      No gallop.  No click. No rub.   Pulses:     Intact distal pulses.   Edema:     Peripheral edema absent.         ROS:  Review of Systems   Constitutional: Negative.   Cardiovascular: Negative.    Respiratory: Negative.     Skin: Negative.    Musculoskeletal: Negative.    Gastrointestinal: Negative.    Genitourinary: Negative.    Neurological: Negative.           Last Labs:  CBC -  Lab Results   Component Value Date    WBC 7.2 02/11/2025    HGB 14.2 02/11/2025    HCT 43.4 02/11/2025    MCV 83.0 02/11/2025     02/11/2025       CMP -  Lab Results   Component Value Date    CALCIUM 9.5 02/11/2025    PROT 6.3 02/11/2025    ALBUMIN 4.1 02/11/2025    AST 23 02/11/2025    ALT 44 (H) 02/11/2025    ALKPHOS 92 02/11/2025    BILITOT 0.9 02/11/2025       LIPID PANEL -   Lab Results   Component Value Date    CHOL 141 02/11/2025    TRIG 92 02/11/2025    HDL 42 (L) 02/11/2025    CHHDL 3.4 02/11/2025    LDLF 84 09/23/2023    VLDL 15 11/15/2024    NHDL 99 02/11/2025       RENAL FUNCTION PANEL -   Lab Results   Component Value Date    GLUCOSE 117 (H) 02/11/2025     02/11/2025    K 4.4 02/11/2025     02/11/2025    CO2 26 02/11/2025    ANIONGAP 8 02/11/2025    BUN 11 02/11/2025    CREATININE 0.75 02/11/2025    CALCIUM 9.5 02/11/2025    ALBUMIN 4.1 02/11/2025        Lab Results   Component Value Date    HGBA1C 6.2 (H) 02/11/2025         Assessment/Plan   Problem List Items Addressed This Visit    None          Latanya Severino, APRN-CNP

## 2025-03-05 ENCOUNTER — OFFICE VISIT (OUTPATIENT)
Dept: PRIMARY CARE | Facility: CLINIC | Age: 70
End: 2025-03-05
Payer: MEDICARE

## 2025-03-05 VITALS
HEIGHT: 65 IN | DIASTOLIC BLOOD PRESSURE: 72 MMHG | SYSTOLIC BLOOD PRESSURE: 130 MMHG | BODY MASS INDEX: 42.15 KG/M2 | WEIGHT: 253 LBS

## 2025-03-05 DIAGNOSIS — E66.9 TYPE 2 DIABETES MELLITUS WITH OBESITY (MULTI): ICD-10-CM

## 2025-03-05 DIAGNOSIS — R09.82 POSTNASAL DRIP: ICD-10-CM

## 2025-03-05 DIAGNOSIS — E11.69 TYPE 2 DIABETES MELLITUS WITH OBESITY (MULTI): ICD-10-CM

## 2025-03-05 DIAGNOSIS — J04.0 ACUTE LARYNGITIS: Primary | ICD-10-CM

## 2025-03-05 PROCEDURE — G2211 COMPLEX E/M VISIT ADD ON: HCPCS | Performed by: INTERNAL MEDICINE

## 2025-03-05 PROCEDURE — 1159F MED LIST DOCD IN RCRD: CPT | Performed by: INTERNAL MEDICINE

## 2025-03-05 PROCEDURE — 3075F SYST BP GE 130 - 139MM HG: CPT | Performed by: INTERNAL MEDICINE

## 2025-03-05 PROCEDURE — 4010F ACE/ARB THERAPY RXD/TAKEN: CPT | Performed by: INTERNAL MEDICINE

## 2025-03-05 PROCEDURE — 3008F BODY MASS INDEX DOCD: CPT | Performed by: INTERNAL MEDICINE

## 2025-03-05 PROCEDURE — 99213 OFFICE O/P EST LOW 20 MIN: CPT | Performed by: INTERNAL MEDICINE

## 2025-03-05 PROCEDURE — 1123F ACP DISCUSS/DSCN MKR DOCD: CPT | Performed by: INTERNAL MEDICINE

## 2025-03-05 PROCEDURE — 3078F DIAST BP <80 MM HG: CPT | Performed by: INTERNAL MEDICINE

## 2025-03-05 RX ORDER — ALBUTEROL SULFATE 90 UG/1
2 INHALANT RESPIRATORY (INHALATION) EVERY 4 HOURS PRN
Qty: 6.7 G | Refills: 0 | Status: SHIPPED | OUTPATIENT
Start: 2025-03-05 | End: 2026-03-05

## 2025-03-05 RX ORDER — CLARITHROMYCIN 500 MG/1
500 TABLET, FILM COATED ORAL 2 TIMES DAILY
Qty: 28 TABLET | Refills: 0 | Status: SHIPPED | OUTPATIENT
Start: 2025-03-05 | End: 2025-03-19

## 2025-03-05 RX ORDER — GUAIFENESIN 100 MG/5ML
200 SOLUTION ORAL 3 TIMES DAILY PRN
Qty: 120 ML | Refills: 0 | Status: SHIPPED | OUTPATIENT
Start: 2025-03-05 | End: 2025-03-15

## 2025-03-05 RX ORDER — BENZONATATE 100 MG/1
100 CAPSULE ORAL 3 TIMES DAILY PRN
Qty: 42 CAPSULE | Refills: 0 | Status: SHIPPED | OUTPATIENT
Start: 2025-03-05 | End: 2025-04-04

## 2025-03-05 RX ORDER — LORATADINE 10 MG/1
10 TABLET ORAL DAILY
Qty: 30 TABLET | Refills: 2 | Status: SHIPPED | OUTPATIENT
Start: 2025-03-05 | End: 2025-06-03

## 2025-03-05 ASSESSMENT — ENCOUNTER SYMPTOMS
OCCASIONAL FEELINGS OF UNSTEADINESS: 0
DEPRESSION: 0
LOSS OF SENSATION IN FEET: 0

## 2025-03-05 NOTE — LETTER
March 5, 2025     Patient: Stefanie Garnett   YOB: 1955   Date of Visit: 3/5/2025       To Whom It May Concern:    Stefanie Garnett was seen in my clinic on 3/5/2025 at 9:15 am. Please excuse Stefanie for her absence from work on 03/05/2025 through 03/09/2025. Stefanie may return to work on 03/10/2025 with no restrictions.    If you have any questions or concerns, please don't hesitate to call.         Sincerely,         Valerio Lew MD

## 2025-03-05 NOTE — PROGRESS NOTES
"Subjective   Patient ID: Stefanie Garnett is a 69 y.o. female who presents for Illness.    This young lady today came here for cough, yellow sputum, sinus congestion, bronchitis, laryngitis, postnasal drip, congestion going on for last several days.  Over-the-counter medications not helping.  She came for follow-up.    I have personally reviewed the patient's Past Medical History, Medications, Allergies, Social History, and Family History in the EMR.    Review of Systems   All other systems reviewed and are negative.    Objective   /72   Ht 1.651 m (5' 5\")   Wt 115 kg (253 lb)   BMI 42.10 kg/m²     Physical Exam  Vitals reviewed.   HENT:      Nose: Congestion present.      Comments: Postnasal drip.     Mouth/Throat:      Comments: Throat congested.  Cardiovascular:      Heart sounds: Normal heart sounds, S1 normal and S2 normal. No murmur heard.     No friction rub.   Pulmonary:      Effort: Pulmonary effort is normal.      Breath sounds: Wheezing present.   Abdominal:      Palpations: There is no hepatomegaly, splenomegaly or mass.   Musculoskeletal:      Right lower leg: No edema.      Left lower leg: No edema.   Lymphadenopathy:      Lower Body: No right inguinal adenopathy. No left inguinal adenopathy.   Neurological:      Cranial Nerves: Cranial nerves 2-12 are intact.      Sensory: No sensory deficit.      Motor: Motor function is intact.      Deep Tendon Reflexes: Reflexes are normal and symmetric.     LAB WORK: Laboratory testing discussed.    Assessment/Plan   Problem List Items Addressed This Visit    None  Visit Diagnoses         Codes    Acute laryngitis    -  Primary J04.0    Postnasal drip     R09.82        1. Acute laryngitis, postnasal drip, congestion.  Biaxin, Claritin, Robitussin, Flonase, Tessalon and albuterol inhaler.  Voice rest.  2. Couple of days off.  3. Follow-up in two weeks if not better.  4. Continue to follow.  5. While on Biaxin, hold statin.    Scribe Attestation  By " signing my name below, I, Elba Ren attest that this documentation has been prepared under the direction and in the presence of Valerio Lew MD.     All medical record entries made by the elba were personally dictated by me I have reviewed the chart and agree the record accurately reflects my personal performance of his history physical examination and management

## 2025-03-07 PROBLEM — E11.69 TYPE 2 DIABETES MELLITUS WITH OBESITY (MULTI): Status: ACTIVE | Noted: 2025-03-07

## 2025-03-07 PROBLEM — E66.9 TYPE 2 DIABETES MELLITUS WITH OBESITY (MULTI): Status: ACTIVE | Noted: 2025-03-07

## 2025-04-19 DIAGNOSIS — E11.9 TYPE 2 DIABETES MELLITUS WITHOUT COMPLICATION, WITHOUT LONG-TERM CURRENT USE OF INSULIN: ICD-10-CM

## 2025-04-19 RX ORDER — TIRZEPATIDE 10 MG/.5ML
10 INJECTION, SOLUTION SUBCUTANEOUS
Qty: 2 ML | Refills: 0 | Status: SHIPPED | OUTPATIENT
Start: 2025-04-20

## 2025-05-05 ENCOUNTER — APPOINTMENT (OUTPATIENT)
Dept: ORTHOPEDIC SURGERY | Facility: CLINIC | Age: 70
End: 2025-05-05
Payer: MEDICARE

## 2025-05-10 LAB
ALBUMIN SERPL-MCNC: 4.1 G/DL (ref 3.6–5.1)
ALP SERPL-CCNC: 83 U/L (ref 37–153)
ALT SERPL-CCNC: 46 U/L (ref 6–29)
ANION GAP SERPL CALCULATED.4IONS-SCNC: 7 MMOL/L (CALC) (ref 7–17)
AST SERPL-CCNC: 23 U/L (ref 10–35)
BILIRUB SERPL-MCNC: 0.9 MG/DL (ref 0.2–1.2)
BUN SERPL-MCNC: 10 MG/DL (ref 7–25)
CALCIUM SERPL-MCNC: 9.8 MG/DL (ref 8.6–10.4)
CHLORIDE SERPL-SCNC: 106 MMOL/L (ref 98–110)
CHOLEST SERPL-MCNC: 139 MG/DL
CHOLEST/HDLC SERPL: 3 (CALC)
CO2 SERPL-SCNC: 29 MMOL/L (ref 20–32)
CREAT SERPL-MCNC: 0.89 MG/DL (ref 0.5–1.05)
EGFRCR SERPLBLD CKD-EPI 2021: 71 ML/MIN/1.73M2
ERYTHROCYTE [DISTWIDTH] IN BLOOD BY AUTOMATED COUNT: 15.9 % (ref 11–15)
EST. AVERAGE GLUCOSE BLD GHB EST-MCNC: 126 MG/DL
EST. AVERAGE GLUCOSE BLD GHB EST-SCNC: 7 MMOL/L
GLUCOSE SERPL-MCNC: 85 MG/DL (ref 65–99)
HBA1C MFR BLD: 6 %
HCT VFR BLD AUTO: 46.2 % (ref 35–45)
HDLC SERPL-MCNC: 47 MG/DL
HGB BLD-MCNC: 14.6 G/DL (ref 11.7–15.5)
LDLC SERPL CALC-MCNC: 77 MG/DL (CALC)
MCH RBC QN AUTO: 27.5 PG (ref 27–33)
MCHC RBC AUTO-ENTMCNC: 31.6 G/DL (ref 32–36)
MCV RBC AUTO: 87.2 FL (ref 80–100)
NONHDLC SERPL-MCNC: 92 MG/DL (CALC)
PLATELET # BLD AUTO: 182 THOUSAND/UL (ref 140–400)
PMV BLD REES-ECKER: 10.6 FL (ref 7.5–12.5)
POTASSIUM SERPL-SCNC: 4.3 MMOL/L (ref 3.5–5.3)
PROT SERPL-MCNC: 6.3 G/DL (ref 6.1–8.1)
RBC # BLD AUTO: 5.3 MILLION/UL (ref 3.8–5.1)
SODIUM SERPL-SCNC: 142 MMOL/L (ref 135–146)
TRIGL SERPL-MCNC: 66 MG/DL
WBC # BLD AUTO: 7.5 THOUSAND/UL (ref 3.8–10.8)

## 2025-05-16 ENCOUNTER — APPOINTMENT (OUTPATIENT)
Dept: PRIMARY CARE | Facility: CLINIC | Age: 70
End: 2025-05-16
Payer: MEDICARE

## 2025-05-16 VITALS
WEIGHT: 239 LBS | SYSTOLIC BLOOD PRESSURE: 126 MMHG | HEIGHT: 65 IN | BODY MASS INDEX: 39.82 KG/M2 | DIASTOLIC BLOOD PRESSURE: 70 MMHG

## 2025-05-16 DIAGNOSIS — E11.69 TYPE 2 DIABETES MELLITUS WITH OBESITY (MULTI): ICD-10-CM

## 2025-05-16 DIAGNOSIS — E78.5 HYPERLIPIDEMIA, UNSPECIFIED HYPERLIPIDEMIA TYPE: ICD-10-CM

## 2025-05-16 DIAGNOSIS — E66.9 TYPE 2 DIABETES MELLITUS WITH OBESITY (MULTI): ICD-10-CM

## 2025-05-16 DIAGNOSIS — L91.8 SKIN TAG: ICD-10-CM

## 2025-05-16 DIAGNOSIS — I10 BENIGN HYPERTENSION: ICD-10-CM

## 2025-05-16 PROCEDURE — 3008F BODY MASS INDEX DOCD: CPT | Performed by: INTERNAL MEDICINE

## 2025-05-16 PROCEDURE — 3074F SYST BP LT 130 MM HG: CPT | Performed by: INTERNAL MEDICINE

## 2025-05-16 PROCEDURE — 3078F DIAST BP <80 MM HG: CPT | Performed by: INTERNAL MEDICINE

## 2025-05-16 PROCEDURE — 4010F ACE/ARB THERAPY RXD/TAKEN: CPT | Performed by: INTERNAL MEDICINE

## 2025-05-16 PROCEDURE — 99214 OFFICE O/P EST MOD 30 MIN: CPT | Performed by: INTERNAL MEDICINE

## 2025-05-16 PROCEDURE — 1159F MED LIST DOCD IN RCRD: CPT | Performed by: INTERNAL MEDICINE

## 2025-05-16 NOTE — PROGRESS NOTES
Subjective   Patient ID: Stefanie Garnett is a 69 y.o. female who presents for Follow-up.    Med Refill    Patient is here for follow-up  She has lost 11 pounds since last visit  She is feeling very good and feeling better.  Given her left shoulder pain is doing better after losing weight  She is concerned about skin tags on the neck because they get stuck  Follow-up on hypertension high cholesterol    Past recap  Patient is here for follow-up needs refill on Mounjaro she was out of the medication for 2 months.  Did not gain weight but did not lose either  Having episodes of dizziness off-and-on follow-up hypertension high cholesterol coronary artery disease  Using CPAP for obstructive sleep apnea    Patient here for follow-up on Mounjaro  No side effects tolerating   Losing weight     Past recap   patient is here for follow-up on Mounjaro  Tolerating the diet wondering if she can increase the dose has no side effects lost 6 pounds    Patient here for follow-up on x-ray.  Lost balance on the carpet of the stairs and fell on the left knee.  It is hurting  Follow-up on hypertension high cholesterol coronary artery disease    Patient is here for follow-up  Had right shoulder surgery.  Doing better  Follow-up on hypertension high cholesterol coronary artery disease  GERD  Using CPAP for sleep apnea    Patient is here for follow-up on Mounjaro  She is losing weight.  Feeling better.  She is on 7.5 mg right now  Her starting weight was 251 on 10/7/2020 she is 231  Going for shoulder surgery    Past recap  patient is here for follow-up on MRI of the right shoulder  She saw the orthopedic doctor and he said it is related to the neck  She went to see the C-spine doctor and MRIs ordered  She feels the pain is all in the shoulder.  She did the MRI  She is losing weight with Mounjaro she lost from 234 last time to 228  She is tolerating the dose not to stay on it.  She never felt this good     patient is here with  complaints of having right shoulder pain.  On September 12 patient fell down backwards while trying to sit on the couch and she landed on the right shoulder.  She had x-ray done in the urgent care which was unremarkable.  She did massage  She is doing physical therapy but is not helping she is having difficult time raising her arm above the head   Patient is also here for refill on Mounjaro it is helping her wants to increase the dose      Past recap   patient is here for follow-up on obesity.  She also lives in her.  Lost 10 pounds.  Only side effect is constipation but taking over-the-counter stool softeners and helping       her patient had appointment with the plastic surgeon  She is going for breast reduction  She needs to lose some weight and she also needs mammogram  Mounjaro is helping to lose some weight she lost 5 pounds  She wants to increase the dose as she is tolerating it well  She is having problems with constipation for Mounjaro she was in the urgent care  Because she slid down the stairs and hurt the right shoulder was not able to move x-ray was negative     patient here for follow-up  Did blood work  Got new CPAP machine  She is using her CPAP machine regularly  Follow-up on hypertension high cholesterol  Having a lot of acid  Not able to lose weight.  Does not follow diet  Does not exercise  Fell 2 weeks ago and hurting on the right shoulder.  Range of motion of the shoulder is normal  She is gaining weight and her breast size is getting weightbearing and causing her back pain wants with reduction surgerypast recap  Did pulmonary function test  Did 2D echo  She needs clearance from cardiologist for her work     Patient is here for follow-up on blood work  She quit taking her metformin  She quit using CPAP machine because of the cough  She did pulmonary function test but results are not available  She saw the allergist and sleep doctor who recommended her to wash the tubing to avoid  "coughing  Patient coughs only at night during the day she is fine  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  Patient has not lost weight  She has not changed her eating habits yet        Patient is here for follow-up on obstructive sleep apnea  She is using the CPAP machine it is helping her  Follow-up on hypertension high cholesterol coronary artery disease   needs medication refill  Not watching diet gained weight     Needs flu shot  Overdue for GYN exam  She is seriously considering gastric bypass surgery because she is not able to lose weight she wants insurance papers filled for her hospital stay for MI     She was not using her CPAP machine  Came to the hospital with chest pain and non-ST elevation MI had repeat cardiac catheter done did not show any significant blockage  Patient was treated with antibiotic and steroid and feeling better  Her blood pressure was high and Norvasc was started She has degenerative changes in the lower back so she cannot do heavy lifting     Review of Systems    Objective   /70   Ht 1.651 m (5' 5\")   Wt 108 kg (239 lb)   BMI 39.77 kg/m²     Physical Exam  Vitals reviewed.   Constitutional:       Appearance: Normal appearance. She is obese.   HENT:      Head: Normocephalic and atraumatic.      Right Ear: Tympanic membrane, ear canal and external ear normal.      Left Ear: Tympanic membrane, ear canal and external ear normal.      Nose: Nose normal.      Mouth/Throat:      Pharynx: Oropharynx is clear.   Eyes:      Extraocular Movements: Extraocular movements intact.      Conjunctiva/sclera: Conjunctivae normal.      Pupils: Pupils are equal, round, and reactive to light.   Cardiovascular:      Rate and Rhythm: Normal rate and regular rhythm.      Pulses: Normal pulses.      Heart sounds: Normal heart sounds.   Pulmonary:      Effort: Pulmonary effort is normal.      Breath sounds: Normal breath sounds.   Abdominal:      General: Abdomen " is flat. Bowel sounds are normal.      Palpations: Abdomen is soft.   Musculoskeletal:         General: Tenderness present.      Cervical back: Normal range of motion and neck supple.      Comments: Right shoulder tenderness and limited range of motion   Skin:     General: Skin is warm and dry.      Comments: Skin tags on neck   Neurological:      General: No focal deficit present.      Mental Status: She is alert and oriented to person, place, and time.   Psychiatric:         Mood and Affect: Mood normal.         Assessment/Plan   Problem List Items Addressed This Visit           ICD-10-CM       Cardiac and Vasculature    Benign hypertension I10    Relevant Orders    CBC    Hyperlipemia E78.5    Relevant Orders    Comprehensive Metabolic Panel    Lipid Panel       Endocrine/Metabolic    Type 2 diabetes mellitus with obesity (Multi) E11.69, E66.9    Relevant Orders    Hemoglobin A1C     Other Visit Diagnoses         Codes      Skin tag     L91.8    Relevant Orders    Referral to Dermatology              past recap  1. CAD. Had NSTEMI August 2015. Catheter showed no definite high-grade culprit lesion that would necessitate intervention. There was some moderate disease in the distal aspect of the first marginal branch of the LCx, but otherwise the patient had very minimal CAD. Echocardiogram done June 2017 showed an EF of 60-65%. Patient had repeat exercise nuclear stress test June 2017 which was negative for ischemia. Patient had again acute MI cardiac catheter did not show any blockage. MI was attributed to coronary spasm     2. Hypertension. Adequately controlled      3. Hyperlipidemia. . Continue atorvastatin 80 mg daily as well as Zetia 10 mg daily.     4. Obesity. Weight loss was encouraged. Refer to Dr. Skip Suarez for considering gastric bypass     5. Sleep apnea. Using CPAP     6. Carotid ultrasound. No hemodynamically significant stenosis when done June 2017.     7. Diabetes  A1c 6.4  Doing better  Continue  Metformin     #8 acute sinusitis  Treat with Z-Tien     Flu shot given  Mammogram ordered  Meds refilled for 6 months          6/27/2023  Blood work reviewed  A1c 6.3 slightly up  Cholesterol under control  Patient is using CPAP with improvement in symptoms uses every night 4 to 6 hours  Patient is not able to lose weight again discussed various options  Encourage diet and exercise   10/7/2023  X-ray of the right shoulder not needed as joint has full range of motion  Treat with Flexeril and Voltaren gel topically  Refer to plastics for breast hypertrophy  Blood work reviewed  Diabetes under control at 6.2  Cholesterol under control  Blood pressure stable TSH is slightly out of range which has happened in the past  We will monitor for now  Discussed diet and exercise to lose weight  We will try Wegovy versus Mounjaro what ever insurance covers to help her lose weight  Using CPAP  Follow-up blood work in 3 months    10/31/2023  Will increase dose of Mounjaro  Again discussed diet and exercise  Mammogram ordered  Call us 100 twice a day for constipation  She had a fall she slid down the stairs went to the urgent care and her right shoulder she hurt but now she is able to move it  Examination is normal  Follow-up in a month    11/27/2023  Patient is tolerating medication well had good response  Will increase the dose    1/2/2024  Patient probably has frozen shoulder  Will get MRI of the right shoulder  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    1/30/2024  MRI of the shoulder showed right shoulder supraspinatus full-thickness tear also shows tendinitis  Refer to orthopedic for further management  Refills given on Mounjaro 7.5  Continue diet and exercise  Blood work reviewed  A1c down to 5.8  ALT slightly elevated at 56  Continue diet exercise  Follow-up blood work in 3 months  Needs to repeat TSH   Follow-up in a  month for Mounjaro    224  Will increase dose of 10 mg  Patient is tolerating no side effects  Losing weight  Still continue diet and exercise  Follow-up in a month    5/28/2024  Blood pressure stable  Patient is due for blood work she did not do the blood work  Blood work ordered  Medications refilled for 90 days  Follow-up after blood work  Again encourage patient to use Mounjaro. continue diet and exercise  Continue Mounjaro if it helps to lose weight  As long as she tolerates    8/10/2024  X-ray of the knee shows moderate arthritis  Full range of motion of the knee  No fracture  Knee brace  To physical therapy  Blood work reviewed  A1c gone up again to 7.3.  Patient does a lot of fluctuations up-and-down  Very hard to stick to the diet and exercise regimen  Blood pressure stable  Cholesterol very well-controlled  Continue Mounjaro  Follow-up blood work in 3 months    9/21/2024  Will continue the same dose of Mounjaro as patient is losing weight  Again discussed diet and exercise is the key factor  Blood pressure stable  Denies any symptoms of constipation  Take stool softener just to be safe to avoid constipation  Flu shot given    11/25/2024   blood work reviewed  ALT 62 A1c is 5.1 patient's weight has not changed much better diabetes is definitely doing better on Mounjaro  Liver enzymes still little high  Cholesterol well controlled  Again discussed diet and exercise with the patient  Follow-up blood work in 3 months    2/18/2025  Follow up to 6.2  Probably because patient was not on Mounjaro  LFTs slightly better  Cholesterol okay  Mounjaro given for 3 months  Follow-up blood work in 3 months  Again discussed diet and exercise  Will do ultrasound carotids to assess symptoms of dizziness discussed long-term complications of obesity sleep apnea diabetes hypertension  Patient better lifestyle  Follow-up in 3 months    5/16/2025  Refer to dermatology for skin tag  Continue Mounjaro same dose patient is doing very  well no side effects losing weight  Blood work reviewed A1c has gone down to 6  Cholesterol well-controlled  Blood pressure well-controlled  Follow-up blood work in 3 months  Refills given for 3 months  Continue diet and exercise

## 2025-08-10 DIAGNOSIS — E78.5 HYPERLIPIDEMIA, UNSPECIFIED: ICD-10-CM

## 2025-08-11 RX ORDER — LOSARTAN POTASSIUM 100 MG/1
100 TABLET ORAL DAILY
Qty: 90 TABLET | Refills: 2 | Status: SHIPPED | OUTPATIENT
Start: 2025-08-11 | End: 2026-05-08

## 2025-08-13 LAB
ALBUMIN SERPL-MCNC: 4.3 G/DL (ref 3.6–5.1)
ALP SERPL-CCNC: 104 U/L (ref 37–153)
ALT SERPL-CCNC: 46 U/L (ref 6–29)
ANION GAP SERPL CALCULATED.4IONS-SCNC: 8 MMOL/L (CALC) (ref 7–17)
AST SERPL-CCNC: 21 U/L (ref 10–35)
BILIRUB SERPL-MCNC: 0.6 MG/DL (ref 0.2–1.2)
BUN SERPL-MCNC: 15 MG/DL (ref 7–25)
CALCIUM SERPL-MCNC: 9.8 MG/DL (ref 8.6–10.4)
CHLORIDE SERPL-SCNC: 106 MMOL/L (ref 98–110)
CHOLEST SERPL-MCNC: 162 MG/DL
CHOLEST/HDLC SERPL: 2.8 (CALC)
CO2 SERPL-SCNC: 26 MMOL/L (ref 20–32)
CREAT SERPL-MCNC: 0.8 MG/DL (ref 0.5–1.05)
EGFRCR SERPLBLD CKD-EPI 2021: 80 ML/MIN/1.73M2
ERYTHROCYTE [DISTWIDTH] IN BLOOD BY AUTOMATED COUNT: 15.2 % (ref 11–15)
EST. AVERAGE GLUCOSE BLD GHB EST-MCNC: 120 MG/DL
EST. AVERAGE GLUCOSE BLD GHB EST-SCNC: 6.6 MMOL/L
GLUCOSE SERPL-MCNC: 88 MG/DL (ref 65–99)
HBA1C MFR BLD: 5.8 %
HCT VFR BLD AUTO: 45.6 % (ref 35–45)
HDLC SERPL-MCNC: 57 MG/DL
HGB BLD-MCNC: 14.7 G/DL (ref 11.7–15.5)
LDLC SERPL CALC-MCNC: 88 MG/DL (CALC)
MCH RBC QN AUTO: 27.7 PG (ref 27–33)
MCHC RBC AUTO-ENTMCNC: 32.2 G/DL (ref 32–36)
MCV RBC AUTO: 86 FL (ref 80–100)
NONHDLC SERPL-MCNC: 105 MG/DL (CALC)
PLATELET # BLD AUTO: 215 THOUSAND/UL (ref 140–400)
PMV BLD REES-ECKER: 11.4 FL (ref 7.5–12.5)
POTASSIUM SERPL-SCNC: 4.2 MMOL/L (ref 3.5–5.3)
PROT SERPL-MCNC: 6.7 G/DL (ref 6.1–8.1)
RBC # BLD AUTO: 5.3 MILLION/UL (ref 3.8–5.1)
SODIUM SERPL-SCNC: 140 MMOL/L (ref 135–146)
TRIGL SERPL-MCNC: 84 MG/DL
WBC # BLD AUTO: 9 THOUSAND/UL (ref 3.8–10.8)

## 2025-08-15 ENCOUNTER — APPOINTMENT (OUTPATIENT)
Dept: PRIMARY CARE | Facility: CLINIC | Age: 70
End: 2025-08-15
Payer: MEDICARE

## 2025-08-16 DIAGNOSIS — E78.5 HYPERLIPIDEMIA, UNSPECIFIED HYPERLIPIDEMIA TYPE: ICD-10-CM

## 2025-08-16 DIAGNOSIS — E66.9 TYPE 2 DIABETES MELLITUS WITH OBESITY (MULTI): ICD-10-CM

## 2025-08-16 DIAGNOSIS — I10 BENIGN HYPERTENSION: ICD-10-CM

## 2025-08-16 DIAGNOSIS — E11.69 TYPE 2 DIABETES MELLITUS WITH OBESITY (MULTI): ICD-10-CM

## 2025-08-21 ENCOUNTER — APPOINTMENT (OUTPATIENT)
Dept: PRIMARY CARE | Facility: CLINIC | Age: 70
End: 2025-08-21
Payer: MEDICARE

## 2025-08-23 ENCOUNTER — OFFICE VISIT (OUTPATIENT)
Dept: PRIMARY CARE | Facility: CLINIC | Age: 70
End: 2025-08-23
Payer: MEDICARE

## 2025-08-23 VITALS
BODY MASS INDEX: 40.98 KG/M2 | SYSTOLIC BLOOD PRESSURE: 134 MMHG | DIASTOLIC BLOOD PRESSURE: 74 MMHG | HEIGHT: 65 IN | WEIGHT: 246 LBS

## 2025-08-23 DIAGNOSIS — E11.9 TYPE 2 DIABETES MELLITUS WITHOUT COMPLICATION, WITHOUT LONG-TERM CURRENT USE OF INSULIN: ICD-10-CM

## 2025-08-23 DIAGNOSIS — I25.10 ARTERIOSCLEROSIS OF CORONARY ARTERY: Primary | ICD-10-CM

## 2025-08-23 DIAGNOSIS — I10 ESSENTIAL (PRIMARY) HYPERTENSION: ICD-10-CM

## 2025-08-23 DIAGNOSIS — E78.5 HYPERLIPIDEMIA, UNSPECIFIED: ICD-10-CM

## 2025-08-23 PROCEDURE — 3075F SYST BP GE 130 - 139MM HG: CPT | Performed by: INTERNAL MEDICINE

## 2025-08-23 PROCEDURE — 99214 OFFICE O/P EST MOD 30 MIN: CPT | Performed by: INTERNAL MEDICINE

## 2025-08-23 PROCEDURE — 4010F ACE/ARB THERAPY RXD/TAKEN: CPT | Performed by: INTERNAL MEDICINE

## 2025-08-23 PROCEDURE — 1159F MED LIST DOCD IN RCRD: CPT | Performed by: INTERNAL MEDICINE

## 2025-08-23 PROCEDURE — 3078F DIAST BP <80 MM HG: CPT | Performed by: INTERNAL MEDICINE

## 2025-08-23 PROCEDURE — 1036F TOBACCO NON-USER: CPT | Performed by: INTERNAL MEDICINE

## 2025-08-23 PROCEDURE — 3008F BODY MASS INDEX DOCD: CPT | Performed by: INTERNAL MEDICINE

## 2025-08-23 RX ORDER — TIRZEPATIDE 10 MG/.5ML
10 INJECTION, SOLUTION SUBCUTANEOUS
Qty: 6 ML | Refills: 0 | Status: SHIPPED | OUTPATIENT
Start: 2025-08-24

## 2025-08-23 RX ORDER — AMLODIPINE BESYLATE 5 MG/1
5 TABLET ORAL DAILY
Qty: 90 TABLET | Refills: 0 | Status: SHIPPED | OUTPATIENT
Start: 2025-08-23

## 2025-08-23 RX ORDER — METOPROLOL TARTRATE 50 MG/1
50 TABLET ORAL 2 TIMES DAILY
Qty: 180 TABLET | Refills: 0 | Status: SHIPPED | OUTPATIENT
Start: 2025-08-23

## 2025-08-23 RX ORDER — ATORVASTATIN CALCIUM 80 MG/1
80 TABLET, FILM COATED ORAL NIGHTLY
Qty: 90 TABLET | Refills: 0 | Status: SHIPPED | OUTPATIENT
Start: 2025-08-23

## 2025-11-22 ENCOUNTER — APPOINTMENT (OUTPATIENT)
Dept: PRIMARY CARE | Facility: CLINIC | Age: 70
End: 2025-11-22
Payer: MEDICARE

## 2025-11-24 ENCOUNTER — APPOINTMENT (OUTPATIENT)
Dept: DERMATOLOGY | Facility: CLINIC | Age: 70
End: 2025-11-24
Payer: MEDICARE

## (undated) DEVICE — TUBING, SUCTION, 6MM X 10, CLEAN N-COND

## (undated) DEVICE — Device

## (undated) DEVICE — GOWN, SURGICAL, SIRUS, NON REINFORCED, LARGE

## (undated) DEVICE — BANDAGE, ELASTIC, MATRIX, SELF-CLOSURE, 6 IN X 5 YD, LF

## (undated) DEVICE — GLOVE, SURGICAL, PROTEXIS PI BLUE W/NEUTHERA, 6.5, PF, LF

## (undated) DEVICE — DRESSING, TRANSPARENT, TEGADERM, FRAME STYLE, 4 X 4.5, STRL

## (undated) DEVICE — DRESSING, NON-ADHERENT, 3 X 3 IN, STERILE

## (undated) DEVICE — PROBE, APOLLO RF, 90 DEG, EXTRA LARTGE

## (undated) DEVICE — BLANKET, LOWER BODY, VHA PLUS, ADULT

## (undated) DEVICE — GLOVE, PROTEXIS PI CLASSIC, SZ-6.5, PF, LF

## (undated) DEVICE — KIT, DISPOSABLES, FOR 2.6 FIBERTAK

## (undated) DEVICE — EXCAL 4MM X 13CM SINGLE

## (undated) DEVICE — DRESSING, GAUZE, SPONGE, KERLIX, SUPER, 6 X 6.75 IN, STERILE 10PK

## (undated) DEVICE — CANNULA, BUTTON, PASSPORT, 8MM X 4CM

## (undated) DEVICE — NEEDLE, ELECTRODE, ELECTROSURGICAL, INSULATED

## (undated) DEVICE — GLOVE, SURGICAL, PROTEXIS PI , 7.5, PF, LF

## (undated) DEVICE — SUTURE, PROLENE, 3-0, 18 IN, PS2, BLUE

## (undated) DEVICE — DRESSING, ABDOMINAL, TENDERSORB, 8 X 7-1/2 IN, STERILE

## (undated) DEVICE — CANNULA, BUTTON, PASSPORT, 8MM X 5CM

## (undated) DEVICE — TUBING, PUMP MAIN 16FT STERILE